# Patient Record
Sex: MALE | Race: WHITE | NOT HISPANIC OR LATINO | Employment: PART TIME | RURAL
[De-identification: names, ages, dates, MRNs, and addresses within clinical notes are randomized per-mention and may not be internally consistent; named-entity substitution may affect disease eponyms.]

---

## 2019-06-17 ENCOUNTER — OFFICE VISIT (OUTPATIENT)
Dept: PHYSICAL THERAPY | Facility: CLINIC | Age: 54
End: 2019-06-17

## 2019-06-17 ENCOUNTER — TRANSCRIBE ORDERS (OUTPATIENT)
Dept: PHYSICAL THERAPY | Facility: CLINIC | Age: 54
End: 2019-06-17

## 2019-06-17 DIAGNOSIS — M25.512 ACUTE PAIN OF LEFT SHOULDER: ICD-10-CM

## 2019-06-17 DIAGNOSIS — Z96.612 STATUS POST TOTAL SHOULDER ARTHROPLASTY, LEFT: Primary | ICD-10-CM

## 2019-06-17 DIAGNOSIS — Z96.612 STATUS POST REPLACEMENT OF LEFT SHOULDER JOINT: Primary | ICD-10-CM

## 2019-06-17 PROCEDURE — 97110 THERAPEUTIC EXERCISES: CPT | Performed by: PHYSICAL THERAPIST

## 2019-06-17 PROCEDURE — G0283 ELEC STIM OTHER THAN WOUND: HCPCS | Performed by: PHYSICAL THERAPIST

## 2019-06-17 PROCEDURE — 97140 MANUAL THERAPY 1/> REGIONS: CPT | Performed by: PHYSICAL THERAPIST

## 2019-06-17 NOTE — PROGRESS NOTES
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 provider found  Date of Initial Visit: 2019  Today's Date: 2019  Patient seen for 12 sessions           Subjective Questionnaire: QuickDASH: 59%      Subjective Evaluation    History of Present Illness    Subjective comment: Pt reports he is doing well, hopes to get out of sling soon. Sees MD this wk. Doing HEP daily, shoulder still feels tight.Pain  Current pain rating: 3             Objective       Passive Range of Motion   Left Shoulder   Flexion: 120 degrees   Abduction: 136 degrees   External rotation 45°: 30 degrees   Internal rotation 45°: 51 degrees     Strength/Myotome Testing     Additional Strength Details  Strength not assessed this date.  Left Shoulder Flexibility Comments:   L UT tightness and L Subscap tightness present.  Right Shoulder Flexibility Comments:   L UT tightness and L Subscap tightness present.    General Comments     Shoulder Comments   Quick DASH = 59% impairment         Assessment & Plan     Assessment  Impairments: abnormal or restricted ROM and impaired physical strength  Prognosis: good  Functional Limitations: carrying objects, lifting, sleeping, pulling, pushing, reaching behind back and reaching overhead  Goals  Plan Goals: STG:  Increase L shoulder PROM to 130 deg in 2 weeks. - Partially Met  Pt to demonstrate full elbow AROM in 2 weeks. - Met    LTG:  Improve Quick DASH to 20% or less impairment by discharge - Not Met  Increase L shoulder strength to 4/5 or greater by discharge - Not Met  Increase : sjpi;marii f;exopm AROM to 140 deg by discharge - Not Met  Pt to be independent with HEP by discharge - Partially Met    Plan  Therapy options: will be seen for skilled physical therapy services  Planned modality interventions: cryotherapy, TENS and thermotherapy  (hydrocollator packs)  Planned therapy interventions: functional ROM exercises, flexibility, home exercise program, joint mobilization, stretching, manual therapy, strengthening and soft tissue mobilization  Frequency: 2x week  Duration in visits: 12  Treatment plan discussed with: patient        Timed:         Manual Therapy:    10     mins  89862;     Therapeutic Exercise:    15     mins  05355;         Un-Timed:  Electrical Stimulation:    15     mins  67803 ( );        Timed Treatment:   25   mins   Total Treatment:     40   mins    PT SIGNATURE: Amparo Horton, PT   DATE TREATMENT INITIATED: 6/17/2019    90 Day Recertification  Certification Period: 9/15/2019  I certify that the therapy services are furnished while this patient is under my care.  The services outlined above are required by this patient, and will be reviewed every 90 days.     PHYSICIAN:  Dr. Nicho Castellano ________________________________________________    DATE:     Please sign and return via fax to  .. Thank you, [facility] Physical Therapy.

## 2019-08-09 ENCOUNTER — TELEPHONE (OUTPATIENT)
Dept: FAMILY MEDICINE CLINIC | Facility: CLINIC | Age: 54
End: 2019-08-09

## 2019-08-09 ENCOUNTER — OFFICE VISIT (OUTPATIENT)
Dept: FAMILY MEDICINE CLINIC | Facility: CLINIC | Age: 54
End: 2019-08-09

## 2019-08-09 VITALS
TEMPERATURE: 97.4 F | RESPIRATION RATE: 18 BRPM | SYSTOLIC BLOOD PRESSURE: 104 MMHG | BODY MASS INDEX: 17.8 KG/M2 | HEART RATE: 78 BPM | DIASTOLIC BLOOD PRESSURE: 66 MMHG | OXYGEN SATURATION: 100 % | WEIGHT: 120.2 LBS | HEIGHT: 69 IN

## 2019-08-09 DIAGNOSIS — R51.9 GENERALIZED HEADACHES: ICD-10-CM

## 2019-08-09 DIAGNOSIS — H93.13 TINNITUS OF BOTH EARS: Primary | ICD-10-CM

## 2019-08-09 PROBLEM — M19.012 DEGENERATIVE JOINT DISEASE OF SHOULDER, LEFT: Status: ACTIVE | Noted: 2019-05-08

## 2019-08-09 PROBLEM — E78.2 MIXED HYPERLIPIDEMIA: Status: ACTIVE | Noted: 2019-08-09

## 2019-08-09 PROCEDURE — 99212 OFFICE O/P EST SF 10 MIN: CPT | Performed by: FAMILY MEDICINE

## 2019-08-09 NOTE — PROGRESS NOTES
Subjective   Mnainder Edwards is a 53 y.o. male.     Was in [place] in 2001, started getting headaches and ringing in ears. Reports headaches are getting worse.       Tinnitus   The current episode started more than 1 year ago. The problem occurs constantly. Associated symptoms include headaches. Pertinent negatives include no abdominal pain, chest pain, fatigue, fever, joint swelling, nausea, neck pain, numbness, rash, swollen glands, vomiting or weakness.        The following portions of the patient's history were reviewed and updated as appropriate: allergies, current medications, past family history, past medical history, past social history, past surgical history and problem list.    Patient Active Problem List   Diagnosis   • Degenerative joint disease of shoulder, left   • Mixed hyperlipidemia       Current Outpatient Medications on File Prior to Visit   Medication Sig Dispense Refill   • ASPIRIN 81 PO Take 81 mg by mouth Daily.       No current facility-administered medications on file prior to visit.        No Known Allergies    Review of Systems   Constitutional: Negative for activity change, appetite change, fatigue and fever.   HENT: Positive for tinnitus. Negative for ear pain and voice change.    Eyes: Negative for visual disturbance.   Respiratory: Negative for shortness of breath and wheezing.    Cardiovascular: Negative for chest pain and leg swelling.   Gastrointestinal: Negative for abdominal pain, blood in stool, constipation, diarrhea, nausea and vomiting.   Endocrine: Negative for polydipsia and polyuria.   Genitourinary: Negative for dysuria, frequency and hematuria.   Musculoskeletal: Negative for joint swelling, neck pain and neck stiffness.   Skin: Negative for rash and bruise.   Neurological: Negative for weakness, numbness and headache.   Psychiatric/Behavioral: Negative for suicidal ideas and depressed mood.       Objective   Physical Exam   Constitutional: He is oriented to person,  place, and time. He appears well-developed and well-nourished.   HENT:   Right Ear: Tympanic membrane, external ear and ear canal normal.   Left Ear: Tympanic membrane, external ear and ear canal normal.   Eyes: Conjunctivae and EOM are normal. Pupils are equal, round, and reactive to light.   Neck: Normal range of motion. Neck supple.   Cardiovascular: Normal rate, regular rhythm and normal heart sounds.   Pulmonary/Chest: Effort normal and breath sounds normal.   Abdominal: Soft. Bowel sounds are normal.   Musculoskeletal: Normal range of motion.   Neurological: He is alert and oriented to person, place, and time.   Skin: Skin is warm and dry.   Psychiatric: He has a normal mood and affect. His behavior is normal. Judgment and thought content normal.         Assessment/Plan .  Problem List Items Addressed This Visit     None      Visit Diagnoses     Tinnitus of both ears    -  Primary    Generalized headaches          Findings discussed. All questions answered.  [facility] and treatment options.

## 2019-08-13 ENCOUNTER — TELEPHONE (OUTPATIENT)
Dept: FAMILY MEDICINE CLINIC | Facility: CLINIC | Age: 54
End: 2019-08-13

## 2019-08-13 NOTE — TELEPHONE ENCOUNTER
Pt notified per Dr. Yo can only give him office note from current visit.  Cannot write letter because we cannot verify what he actually had going on back in 2002.

## 2019-08-13 NOTE — TELEPHONE ENCOUNTER
PT ASKS FOR LETTER STATING PT HAS HAD RINGING IN EAR AND HEADACHES - STATES HAS HAD SINCE 2002 WHEN HE WAS IN [facility] - NEEDS DOCUMENTATION TO TAKE TO [facility] SHOWING HE HAS THIS PROBLEM -   PLEASE CALL WHEN READY - PT WOULD LIKE TO  -

## 2019-08-21 ENCOUNTER — OFFICE VISIT (OUTPATIENT)
Dept: PHYSICAL THERAPY | Facility: CLINIC | Age: 54
End: 2019-08-21

## 2019-08-21 DIAGNOSIS — M25.512 ACUTE PAIN OF LEFT SHOULDER: ICD-10-CM

## 2019-08-21 DIAGNOSIS — Z96.612 STATUS POST REPLACEMENT OF LEFT SHOULDER JOINT: Primary | ICD-10-CM

## 2019-08-21 PROCEDURE — 97110 THERAPEUTIC EXERCISES: CPT | Performed by: PHYSICAL THERAPIST

## 2019-08-21 PROCEDURE — 97140 MANUAL THERAPY 1/> REGIONS: CPT | Performed by: PHYSICAL THERAPIST

## 2019-08-21 NOTE — PROGRESS NOTES
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 Reports he is doing 5# overhead presses, 20# bicep curls, table push-ups and push-up on floor on knees. Reports having no pain just tightness.    Objective : Early scap movement noted during flexion and abd AROM.   Posture: L scap elevated and abducted.  Tightness noted L UT, L lat and teres. Good response to manual stretching.  Strength L shoulder: flex = 4/5, abd = 4-/5, ER = 3+/5, IR = 4+/5  L shoulder AROM: flex = 114; abd = 118; ER = 48; IR = 50    See Exercise, Manual, and Modality Logs for complete treatment.  Instructed pt on lat and teres stretching with use of belt to block or hand to block scap. Pt demonstrated good understanding.      Assessment/Plan : Needs scap strengthening along with lat and teres stretching to improve scapular stability and mobility. Overall strength gains made since last visit. Pt very motivated.    Progress per Plan of Care and Progress strengthening /stabilization /functional activity           Timed:         Manual Therapy:    25     mins  84362;     Therapeutic Exercise:    30     mins  91181;     Neuromuscular Astrid:        mins  97296;    Therapeutic Activity:          mins  14837;     Gait Training:           mins  61160;     Ultrasound:          mins  65122;    Ionto                                   mins   28404  Self Care                            mins   86121  Canalith Repos                   mins  4209    Un-Timed:  Electrical Stimulation:         mins  95959 ( );  Dry  Needling          mins self-pay  Traction          mins 11785  Low Eval          Mins  13449  Mod Eval          Mins  92707  High Eval                            Mins  02894    Timed Treatment:   55   mins   Total Treatment:     55   mins    Amparo Horton, PT  Physical Therapist

## 2019-08-22 ENCOUNTER — OFFICE VISIT (OUTPATIENT)
Dept: PHYSICAL THERAPY | Facility: CLINIC | Age: 54
End: 2019-08-22

## 2019-08-22 DIAGNOSIS — M25.512 ACUTE PAIN OF LEFT SHOULDER: ICD-10-CM

## 2019-08-22 DIAGNOSIS — Z96.612 STATUS POST REPLACEMENT OF LEFT SHOULDER JOINT: Primary | ICD-10-CM

## 2019-08-22 PROCEDURE — 97140 MANUAL THERAPY 1/> REGIONS: CPT | Performed by: PHYSICAL THERAPIST

## 2019-08-22 PROCEDURE — 97110 THERAPEUTIC EXERCISES: CPT | Performed by: PHYSICAL THERAPIST

## 2019-08-22 NOTE — PROGRESS NOTES
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 No soreness after PT visit yesterday. Pt thankful for new exercises provided yesterday also.    Objective :   See Exercise, Manual, and Modality Logs for complete treatment.       Assessment/Plan : Good response to lat and teres stretching at start of session. Good tolerance to all ther ex with good awareness of posture and biomechanics of shoulder during ther ex.    Progress per Plan of Care and Progress strengthening /stabilization /functional activity           Timed:         Manual Therapy:    12     mins  58957;     Therapeutic Exercise:    22     mins  46023;     Neuromuscular Astrid:        mins  85969;    Therapeutic Activity:          mins  10058;     Gait Training:           mins  62246;     Ultrasound:          mins  34371;    Ionto                                   mins   70913  Self Care                            mins   08787  Canalith Repos                   mins  4209    Un-Timed:  Electrical Stimulation:         mins  41417 ( );  Dry Needling          mins self-pay  Traction          mins 20132  Low Eval          Mins  24856  Mod Eval          Mins  54289  High Eval                            Mins  21967    Timed Treatment:   34   mins   Total Treatment:     40   mins    Amparo Horton, PT  Physical Therapist

## 2019-08-27 ENCOUNTER — OFFICE VISIT (OUTPATIENT)
Dept: PHYSICAL THERAPY | Facility: CLINIC | Age: 54
End: 2019-08-27

## 2019-08-27 DIAGNOSIS — Z96.612 STATUS POST REPLACEMENT OF LEFT SHOULDER JOINT: Primary | ICD-10-CM

## 2019-08-27 DIAGNOSIS — M25.512 ACUTE PAIN OF LEFT SHOULDER: ICD-10-CM

## 2019-08-27 PROCEDURE — 97110 THERAPEUTIC EXERCISES: CPT | Performed by: PHYSICAL THERAPIST

## 2019-08-27 PROCEDURE — 97140 MANUAL THERAPY 1/> REGIONS: CPT | Performed by: PHYSICAL THERAPIST

## 2019-08-27 NOTE — PROGRESS NOTES
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 Most pain is in morning with stiffness and improves after he stretches. States he did 20 push-ups yesterday and could feel it at anterior shoulder.    Objective : pec tightness noted.  See Exercise, Manual, and Modality Logs for complete treatment.       Assessment/Plan : Good tolerance to ther ex progression with no reports of pain. Good response to doorway pec stretch and doorway ext rot stretch. Needs continuing stretching and scap strengthening for improved shoulder biomechanics.    Progress per Plan of Care and Progress strengthening /stabilization /functional activity           Timed:         Manual Therapy:    10     mins  08587;     Therapeutic Exercise:    29     mins  87449;     Neuromuscular Astrid:        mins  56022;    Therapeutic Activity:          mins  16458;     Gait Training:           mins  81661;     Ultrasound:          mins  49546;    Ionto                                   mins   08353  Self Care                            mins   62719  Canalith Repos                   mins  4209    Un-Timed:  Electrical Stimulation:         mins  94213 ( );  Dry Needling          mins self-pay  Traction          mins 01294  Low Eval          Mins  69985  Mod Eval          Mins  37437  High Eval                            Mins  14624    Timed Treatment:   39   mins   Total Treatment:     39   mins    Amparo Horton, PT  Physical Therapist

## 2019-08-28 ENCOUNTER — OFFICE VISIT (OUTPATIENT)
Dept: PHYSICAL THERAPY | Facility: CLINIC | Age: 54
End: 2019-08-28

## 2019-08-28 DIAGNOSIS — Z96.612 STATUS POST REPLACEMENT OF LEFT SHOULDER JOINT: Primary | ICD-10-CM

## 2019-08-28 DIAGNOSIS — M25.512 ACUTE PAIN OF LEFT SHOULDER: ICD-10-CM

## 2019-08-28 PROCEDURE — 97140 MANUAL THERAPY 1/> REGIONS: CPT | Performed by: PHYSICAL THERAPIST

## 2019-08-28 PROCEDURE — 97110 THERAPEUTIC EXERCISES: CPT | Performed by: PHYSICAL THERAPIST

## 2019-08-28 NOTE — PROGRESS NOTES
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 No pain today. Stretches added to HEP yesterday feel good, can tell his chest muscles are tight.    Objective : Pt denied any MHP or ice to shoulder post-tx.  See Exercise, Manual, and Modality Logs for complete treatment.       Assessment/Plan : Good tolerance to ther ex progression. Responding well to scap strengthening and stretching. Needs continued scap base strengthening for improved shoulder biomechanics.     Progress per Plan of Care and Progress strengthening /stabilization /functional activity           Timed:         Manual Therapy:    10     mins  93619;     Therapeutic Exercise:    30     mins  28928;     Neuromuscular Astrid:        mins  57424;    Therapeutic Activity:          mins  53970;     Gait Training:           mins  38105;     Ultrasound:          mins  50863;    Ionto                                   mins   42427  Self Care                            mins   42179  Canalith Repos                   mins  4209    Un-Timed:  Electrical Stimulation:         mins  77423 ( );  Dry Needling          mins self-pay  Traction          mins 53325  Low Eval          Mins  40130  Mod Eval          Mins  21397  High Eval                            Mins  61228    Timed Treatment:   40   mins   Total Treatment:     40   mins    Amparo Horton, PT  Physical Therapist

## 2019-09-04 ENCOUNTER — OFFICE VISIT (OUTPATIENT)
Dept: PHYSICAL THERAPY | Facility: CLINIC | Age: 54
End: 2019-09-04

## 2019-09-04 DIAGNOSIS — M25.512 ACUTE PAIN OF LEFT SHOULDER: ICD-10-CM

## 2019-09-04 DIAGNOSIS — Z96.612 STATUS POST REPLACEMENT OF LEFT SHOULDER JOINT: Primary | ICD-10-CM

## 2019-09-04 PROCEDURE — 97140 MANUAL THERAPY 1/> REGIONS: CPT | Performed by: PHYSICAL THERAPIST

## 2019-09-04 PROCEDURE — 97110 THERAPEUTIC EXERCISES: CPT | Performed by: PHYSICAL THERAPIST

## 2019-09-04 NOTE — PROGRESS NOTES
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 Has not worked out x3-4 days due to being busy and mom having surgery.    Objective : Pt declined MHP or ice post-tx.  See Exercise, Manual, and Modality Logs for complete treatment.       Assessment/Plan : Continued lateral scap tightness but improving. Good tolerance to all ther ex.     Progress per Plan of Care and Progress strengthening /stabilization /functional activity           Timed:         Manual Therapy:    10     mins  93207;     Therapeutic Exercise:    30     mins  57171;     Neuromuscular Astrid:        mins  59644;    Therapeutic Activity:          mins  64970;     Gait Training:           mins  17142;     Ultrasound:          mins  15294;    Ionto                                   mins   51089  Self Care                            mins   05733  Canalith Repos                   mins  4209    Un-Timed:  Electrical Stimulation:         mins  01142 ( );  Dry Needling          mins self-pay  Traction          mins 38402  Low Eval          Mins  13133  Mod Eval          Mins  10660  High Eval                            Mins  84758    Timed Treatment:   40   mins   Total Treatment:     40   mins    Amparo Horton, PT  Physical Therapist

## 2019-09-06 ENCOUNTER — OFFICE VISIT (OUTPATIENT)
Dept: PHYSICAL THERAPY | Facility: CLINIC | Age: 54
End: 2019-09-06

## 2019-09-06 DIAGNOSIS — Z96.612 STATUS POST REPLACEMENT OF LEFT SHOULDER JOINT: Primary | ICD-10-CM

## 2019-09-06 DIAGNOSIS — M25.512 ACUTE PAIN OF LEFT SHOULDER: ICD-10-CM

## 2019-09-06 PROCEDURE — 97110 THERAPEUTIC EXERCISES: CPT | Performed by: PHYSICAL THERAPIST

## 2019-09-06 PROCEDURE — 97140 MANUAL THERAPY 1/> REGIONS: CPT | Performed by: PHYSICAL THERAPIST

## 2019-09-06 NOTE — PROGRESS NOTES
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 Happy with progress. No pain today.    Objective : Decreased tightness noted at lat and teres.  See Exercise, Manual, and Modality Logs for complete treatment.       Assessment/Plan : AROM improving. Scap strength progressing. Progressing well toward goals.    Progress per Plan of Care and Progress strengthening /stabilization /functional activity.           Timed:         Manual Therapy:    12     mins  32293;     Therapeutic Exercise:    20     mins  56113;     Neuromuscular Astrid:        mins  86118;    Therapeutic Activity:          mins  30399;     Gait Training:           mins  38370;     Ultrasound:          mins  39606;    Ionto                                   mins   83906  Self Care                            mins   13788  Canalith Repos                   mins  4209    Un-Timed:  Electrical Stimulation:         mins  28843 ( );  Dry Needling          mins self-pay  Traction          mins 59935  Low Eval          Mins  13005  Mod Eval          Mins  09180  High Eval                            Mins  11918    Timed Treatment:   32   mins   Total Treatment:     32   mins    Amparo Horton, PT  Physical Therapist

## 2019-09-10 ENCOUNTER — OFFICE VISIT (OUTPATIENT)
Dept: PHYSICAL THERAPY | Facility: CLINIC | Age: 54
End: 2019-09-10

## 2019-09-10 DIAGNOSIS — M25.512 ACUTE PAIN OF LEFT SHOULDER: ICD-10-CM

## 2019-09-10 DIAGNOSIS — Z96.612 STATUS POST REPLACEMENT OF LEFT SHOULDER JOINT: Primary | ICD-10-CM

## 2019-09-10 PROCEDURE — 97110 THERAPEUTIC EXERCISES: CPT | Performed by: PHYSICAL THERAPIST

## 2019-09-10 PROCEDURE — 97140 MANUAL THERAPY 1/> REGIONS: CPT | Performed by: PHYSICAL THERAPIST

## 2019-09-10 NOTE — PROGRESS NOTES
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 Pt states he still feels weak with rotation movements.    Objective :   See Exercise, Manual, and Modality Logs for complete treatment.       Assessment/Plan : Good tolerance to all ther ex with fatigue noted with rotation movements. Progressing well with scap strengthening.    Progress per Plan of Care and Progress strengthening /stabilization /functional activity           Timed:         Manual Therapy:    9     mins  92324;     Therapeutic Exercise:    21     mins  94924;     Neuromuscular Astrid:        mins  93793;    Therapeutic Activity:          mins  37193;     Gait Training:           mins  38598;     Ultrasound:          mins  66565;    Ionto                                   mins   40462  Self Care                            mins   37539  Canalith Repos                   mins  4209    Un-Timed:  Electrical Stimulation:         mins  48039 ( );  Dry Needling          mins self-pay  Traction          mins 41766  Low Eval          Mins  73394  Mod Eval          Mins  69981  High Eval                            Mins  50666    Timed Treatment:   30   mins   Total Treatment:     30   mins    Amparo Horton, PT  Physical Therapist

## 2019-09-12 ENCOUNTER — OFFICE VISIT (OUTPATIENT)
Dept: PHYSICAL THERAPY | Facility: CLINIC | Age: 54
End: 2019-09-12

## 2019-09-12 DIAGNOSIS — M25.512 ACUTE PAIN OF LEFT SHOULDER: ICD-10-CM

## 2019-09-12 DIAGNOSIS — Z96.612 STATUS POST REPLACEMENT OF LEFT SHOULDER JOINT: Primary | ICD-10-CM

## 2019-09-12 PROCEDURE — 97140 MANUAL THERAPY 1/> REGIONS: CPT | Performed by: PHYSICAL THERAPIST

## 2019-09-12 PROCEDURE — 97110 THERAPEUTIC EXERCISES: CPT | Performed by: PHYSICAL THERAPIST

## 2019-09-12 NOTE — PROGRESS NOTES
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 Pt states his shoulder felt stiff yesterday morning but after doing exercises, shoulder felt good.    Objective :   See Exercise, Manual, and Modality Logs for complete treatment.       Assessment/Plan : Good tolerance to ther ex with improved shoulder biomechanics noted. Progressing well toward goals.    Progress per Plan of Care and Progress strengthening /stabilization /functional activity           Timed:         Manual Therapy:    8     mins  10588;     Therapeutic Exercise:    20     mins  67714;     Neuromuscular Astrid:        mins  74994;    Therapeutic Activity:          mins  65617;     Gait Training:           mins  97698;     Ultrasound:          mins  24009;    Ionto                                   mins   70272  Self Care                            mins   62520  Canalith Repos                   mins  4209    Un-Timed:  Electrical Stimulation:         mins  62436 ( );  Dry Needling          mins self-pay  Traction          mins 82206  Low Eval          Mins  19960  Mod Eval          Mins  62935  High Eval                            Mins  53387    Timed Treatment:   30   mins   Total Treatment:     35   mins    Amparo Horton, PT  Physical Therapist

## 2019-09-19 ENCOUNTER — TREATMENT (OUTPATIENT)
Dept: PHYSICAL THERAPY | Facility: CLINIC | Age: 54
End: 2019-09-19

## 2019-09-19 DIAGNOSIS — M25.512 ACUTE PAIN OF LEFT SHOULDER: ICD-10-CM

## 2019-09-19 DIAGNOSIS — Z96.612 STATUS POST REPLACEMENT OF LEFT SHOULDER JOINT: Primary | ICD-10-CM

## 2019-09-19 PROCEDURE — 97140 MANUAL THERAPY 1/> REGIONS: CPT | Performed by: PHYSICAL THERAPIST

## 2019-09-19 PROCEDURE — 97110 THERAPEUTIC EXERCISES: CPT | Performed by: PHYSICAL THERAPIST

## 2019-09-19 NOTE — PROGRESS NOTES
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 States he feels like L shoulder is lacking 30% or R function.    Objective :  See Exercise, Manual, and Modality Logs for complete treatment.       Assessment/Plan : Good tolerance to all ther ex with no reports of pain. Progressing well in strength.    Progress per Plan of Care and Progress strengthening /stabilization /functional activity.           Timed:         Manual Therapy:    8     mins  90771;     Therapeutic Exercise:    22     mins  07812;     Neuromuscular Astrid:        mins  06853;    Therapeutic Activity:          mins  43363;     Gait Training:           mins  77554;     Ultrasound:          mins  56031;    Ionto                                   mins   93666  Self Care                            mins   50320  Canalith Repos                   mins  4209    Un-Timed:  Electrical Stimulation:         mins  15565 ( );  Dry Needling          mins self-pay  Traction          mins 85337  Low Eval          Mins  17819  Mod Eval          Mins  50672  High Eval                            Mins  89371    Timed Treatment:   30   mins   Total Treatment:     36   mins    Amparo Horton, PT  Physical Therapist

## 2019-09-23 ENCOUNTER — TREATMENT (OUTPATIENT)
Dept: PHYSICAL THERAPY | Facility: CLINIC | Age: 54
End: 2019-09-23

## 2019-09-23 DIAGNOSIS — M25.512 ACUTE PAIN OF LEFT SHOULDER: ICD-10-CM

## 2019-09-23 DIAGNOSIS — Z96.612 STATUS POST REPLACEMENT OF LEFT SHOULDER JOINT: Primary | ICD-10-CM

## 2019-09-23 PROCEDURE — 97110 THERAPEUTIC EXERCISES: CPT | Performed by: PHYSICAL THERAPIST

## 2019-09-23 PROCEDURE — 97140 MANUAL THERAPY 1/> REGIONS: CPT | Performed by: PHYSICAL THERAPIST

## 2019-09-23 NOTE — PROGRESS NOTES
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 Sees MD in 2 days. States shoulder feels stiff today.    Objective : L shoulder strength 4-/5 in all directions.  L shoulder AROM in standing: Flex = 125 deg, abd = 145 deg  See Exercise, Manual, and Modality Logs for complete treatment.       Assessment/Plan : Good tolerance to all ther ex. Progressing well toward goals. Needs continued shoulder strengthening.    Progress per Plan of Care and Progress strengthening /stabilization /functional activity           Timed:         Manual Therapy:    10     mins  21107;     Therapeutic Exercise:    22     mins  17852;     Neuromuscular Astrid:        mins  11319;    Therapeutic Activity:          mins  25394;     Gait Training:           mins  48954;     Ultrasound:          mins  77351;    Ionto                                   mins   28599  Self Care                            mins   83213  Canalith Repos                   mins  4209    Un-Timed:  Electrical Stimulation:         mins  70770 ( );  Dry Needling          mins self-pay  Traction          mins 33535  Low Eval          Mins  72011  Mod Eval          Mins  15828  High Eval                            Mins  54528    Timed Treatment:   32   mins   Total Treatment:     32   mins    Amparo Horton, PT  Physical Therapist

## 2019-09-26 ENCOUNTER — TREATMENT (OUTPATIENT)
Dept: PHYSICAL THERAPY | Facility: CLINIC | Age: 54
End: 2019-09-26

## 2019-09-26 DIAGNOSIS — M25.512 ACUTE PAIN OF LEFT SHOULDER: ICD-10-CM

## 2019-09-26 DIAGNOSIS — Z96.612 STATUS POST REPLACEMENT OF LEFT SHOULDER JOINT: Primary | ICD-10-CM

## 2019-09-26 PROCEDURE — 97140 MANUAL THERAPY 1/> REGIONS: CPT | Performed by: PHYSICAL THERAPIST

## 2019-09-26 PROCEDURE — 97110 THERAPEUTIC EXERCISES: CPT | Performed by: PHYSICAL THERAPIST

## 2019-09-26 NOTE — PROGRESS NOTES
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 Goes back to MD 3/2020. States he had some discomfort in his shoulder when moving and throwing some branches at his parents' house over the weekend.    Objective :   See Exercise, Manual, and Modality Logs for complete treatment.     Assessment/Plan : Pt tolerated ther ex progression well. Needs continued scap strengthening for improved shoulder biomechanics.    Progress per Plan of Care and Progress strengthening /stabilization /functional activity.           Timed:         Manual Therapy:    10     mins  52735;     Therapeutic Exercise:    30     mins  32329;     Neuromuscular Astrid:        mins  51712;    Therapeutic Activity:          mins  66381;     Gait Training:           mins  87255;     Ultrasound:          mins  20754;    Ionto                                   mins   08715  Self Care                            mins   99634  Canalith Repos                   mins  4209    Un-Timed:  Electrical Stimulation:         mins  27626 ( );  Dry Needling          mins self-pay  Traction          mins 02129  Low Eval          Mins  59730  Mod Eval          Mins  14200  High Eval                            Mins  19488    Timed Treatment:   40   mins   Total Treatment:     40   mins    Amparo Horton, PT  Physical Therapist

## 2019-10-02 ENCOUNTER — TREATMENT (OUTPATIENT)
Dept: PHYSICAL THERAPY | Facility: CLINIC | Age: 54
End: 2019-10-02

## 2019-10-02 DIAGNOSIS — Z96.612 STATUS POST REPLACEMENT OF LEFT SHOULDER JOINT: Primary | ICD-10-CM

## 2019-10-02 DIAGNOSIS — M25.512 ACUTE PAIN OF LEFT SHOULDER: ICD-10-CM

## 2019-10-02 PROCEDURE — 97110 THERAPEUTIC EXERCISES: CPT | Performed by: PHYSICAL THERAPIST

## 2019-10-02 PROCEDURE — 97140 MANUAL THERAPY 1/> REGIONS: CPT | Performed by: PHYSICAL THERAPIST

## 2019-10-02 NOTE — PROGRESS NOTES
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     Objective :   See Exercise, Manual, and Modality Logs for complete treatment.       Assessment/Plan : Good tolerance to ther ex with no reports of pain. Needs continued scap and shoulder strengthening.    Progress per Plan of Care and Progress strengthening /stabilization /functional activity.           Timed:         Manual Therapy:    10     mins  05763;     Therapeutic Exercise:    20     mins  28929;     Neuromuscular Astrid:        mins  95823;    Therapeutic Activity:          mins  79615;     Gait Training:           mins  70420;     Ultrasound:          mins  74400;    Ionto                                   mins   61964  Self Care                            mins   14074  Canalith Repos                   mins  4209    Un-Timed:  Electrical Stimulation:         mins  11350 ( );  Dry Needling          mins self-pay  Traction          mins 12334  Low Eval          Mins  61692  Mod Eval          Mins  92099  High Eval                            Mins  48334    Timed Treatment:   30   mins   Total Treatment:     30   mins    Amparo Horton, PT  Physical Therapist

## 2019-10-08 ENCOUNTER — TREATMENT (OUTPATIENT)
Dept: PHYSICAL THERAPY | Facility: CLINIC | Age: 54
End: 2019-10-08

## 2019-10-08 DIAGNOSIS — M25.512 ACUTE PAIN OF LEFT SHOULDER: ICD-10-CM

## 2019-10-08 DIAGNOSIS — Z96.612 STATUS POST REPLACEMENT OF LEFT SHOULDER JOINT: Primary | ICD-10-CM

## 2019-10-08 PROCEDURE — 97140 MANUAL THERAPY 1/> REGIONS: CPT | Performed by: PHYSICAL THERAPIST

## 2019-10-08 PROCEDURE — 97110 THERAPEUTIC EXERCISES: CPT | Performed by: PHYSICAL THERAPIST

## 2019-10-08 NOTE — PROGRESS NOTES
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 States he worked out yesterday, had some fatigue noted with push-ups but states he did regular push-ups.    Objective : Added shoulder PNF patterns to HEP with Tband. Provided red and green Tbands and copy of exercises.  See Exercise, Manual, and Modality Logs for complete treatment.       Assessment/Plan : Good tolerance to all ther ex. Progressing well toward goals but pt reports weakness.    Progress per Plan of Care and Progress strengthening /stabilization /functional activity.           Timed:         Manual Therapy:    8     mins  08256;     Therapeutic Exercise:    33     mins  21571;     Neuromuscular Astrid:        mins  61489;    Therapeutic Activity:          mins  87180;     Gait Training:           mins  65967;     Ultrasound:          mins  26687;    Ionto                                   mins   36781  Self Care                            mins   15884  Canalith Repos                   mins  4209    Un-Timed:  Electrical Stimulation:         mins  74769 ( );  Dry Needling          mins self-pay  Traction          mins 09552  Low Eval          Mins  61106  Mod Eval          Mins  28493  High Eval                            Mins  56735    Timed Treatment:   41   mins   Total Treatment:     41   mins    Amparo Horton, PT  Physical Therapist

## 2019-10-10 ENCOUNTER — TREATMENT (OUTPATIENT)
Dept: PHYSICAL THERAPY | Facility: CLINIC | Age: 54
End: 2019-10-10

## 2019-10-10 DIAGNOSIS — Z96.612 STATUS POST REPLACEMENT OF LEFT SHOULDER JOINT: Primary | ICD-10-CM

## 2019-10-10 DIAGNOSIS — M25.512 ACUTE PAIN OF LEFT SHOULDER: ICD-10-CM

## 2019-10-10 PROCEDURE — 97110 THERAPEUTIC EXERCISES: CPT | Performed by: PHYSICAL THERAPIST

## 2019-10-10 PROCEDURE — 97140 MANUAL THERAPY 1/> REGIONS: CPT | Performed by: PHYSICAL THERAPIST

## 2019-10-10 NOTE — PROGRESS NOTES
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 Can tell he is still making strength gains.    Objective :  See Exercise, Manual, and Modality Logs for complete treatment.       Assessment/Plan : Good tolerance to ther ex with improved scap awareness and stabilization. Progressing well toward goals.    Progress per Plan of Care and Progress strengthening /stabilization /functional activity.           Timed:         Manual Therapy:    10     mins  17588;     Therapeutic Exercise:    29     mins  52884;     Neuromuscular Astrid:        mins  34024;    Therapeutic Activity:          mins  47890;     Gait Training:           mins  22030;     Ultrasound:          mins  54674;    Ionto                                   mins   23209  Self Care                            mins   16359  Canalith Repos                   mins  4209    Un-Timed:  Electrical Stimulation:         mins  03020 ( );  Dry Needling          mins self-pay  Traction          mins 40046  Low Eval          Mins  31356  Mod Eval          Mins  41710  High Eval                            Mins  88645    Timed Treatment:   39   mins   Total Treatment:     39   mins    Amparo Horton, PT  Physical Therapist

## 2019-10-15 ENCOUNTER — TREATMENT (OUTPATIENT)
Dept: PHYSICAL THERAPY | Facility: CLINIC | Age: 54
End: 2019-10-15

## 2019-10-15 DIAGNOSIS — Z96.612 STATUS POST REPLACEMENT OF LEFT SHOULDER JOINT: Primary | ICD-10-CM

## 2019-10-15 DIAGNOSIS — M25.512 ACUTE PAIN OF LEFT SHOULDER: ICD-10-CM

## 2019-10-15 PROCEDURE — 97140 MANUAL THERAPY 1/> REGIONS: CPT | Performed by: PHYSICAL THERAPIST

## 2019-10-15 PROCEDURE — 97110 THERAPEUTIC EXERCISES: CPT | Performed by: PHYSICAL THERAPIST

## 2019-10-15 NOTE — PROGRESS NOTES
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 Sleeping good. Feels like strength is improving.    Objective :  See Exercise, Manual, and Modality Logs for complete treatment.       Assessment/Plan : Good tolerance to all ther ex with no pain. Strength progressing well.    Progress per Plan of Care and Progress strengthening /stabilization /functional activity.           Timed:         Manual Therapy:    10     mins  19008;     Therapeutic Exercise:    22     mins  11134;     Neuromuscular Astrid:        mins  23007;    Therapeutic Activity:          mins  62818;     Gait Training:           mins  70527;     Ultrasound:          mins  90579;    Ionto                                   mins   43686  Self Care                            mins   54200  Canalith Repos                   mins  4209    Un-Timed:  Electrical Stimulation:         mins  81277 ( );  Dry Needling          mins self-pay  Traction          mins 00525  Low Eval          Mins  78855  Mod Eval          Mins  60301  High Eval                            Mins  17513    Timed Treatment:   32   mins   Total Treatment:     32   mins    Amparo Horton, PT  Physical Therapist

## 2019-10-17 ENCOUNTER — TREATMENT (OUTPATIENT)
Dept: PHYSICAL THERAPY | Facility: CLINIC | Age: 54
End: 2019-10-17

## 2019-10-17 DIAGNOSIS — Z96.612 STATUS POST REPLACEMENT OF LEFT SHOULDER JOINT: Primary | ICD-10-CM

## 2019-10-17 DIAGNOSIS — M25.512 ACUTE PAIN OF LEFT SHOULDER: ICD-10-CM

## 2019-10-17 PROCEDURE — 97140 MANUAL THERAPY 1/> REGIONS: CPT | Performed by: PHYSICAL THERAPIST

## 2019-10-17 PROCEDURE — 97110 THERAPEUTIC EXERCISES: CPT | Performed by: PHYSICAL THERAPIST

## 2019-10-17 NOTE — PROGRESS NOTES
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 No shoulder pain, just stiffness.    Objective :  See Exercise, Manual, and Modality Logs for complete treatment.       Assessment/Plan : Good tolerance to all ther ex with no reports of pain. Strength continues to progress with good shoulder biomechanics.    Progress per Plan of Care and Progress strengthening /stabilization /functional activity.           Timed:         Manual Therapy:    8     mins  92086;     Therapeutic Exercise:    22     mins  36180;     Neuromuscular Astrid:        mins  44604;    Therapeutic Activity:          mins  75479;     Gait Training:           mins  76821;     Ultrasound:          mins  67706;    Ionto                                   mins   23378  Self Care                            mins   12622  Canalith Repos                   mins  4209    Un-Timed:  Electrical Stimulation:         mins  14848 ( );  Dry Needling          mins self-pay  Traction          mins 98776  Low Eval          Mins  51563  Mod Eval          Mins  02591  High Eval                            Mins  02582    Timed Treatment:   30   mins   Total Treatment:     30   mins    Amparo Horton, PT  Physical Therapist

## 2019-10-22 ENCOUNTER — TREATMENT (OUTPATIENT)
Dept: PHYSICAL THERAPY | Facility: CLINIC | Age: 54
End: 2019-10-22

## 2019-10-22 DIAGNOSIS — Z96.612 STATUS POST REPLACEMENT OF LEFT SHOULDER JOINT: Primary | ICD-10-CM

## 2019-10-22 DIAGNOSIS — M25.512 ACUTE PAIN OF LEFT SHOULDER: ICD-10-CM

## 2019-10-22 PROCEDURE — 97110 THERAPEUTIC EXERCISES: CPT | Performed by: PHYSICAL THERAPIST

## 2019-10-22 NOTE — PROGRESS NOTES
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 Still having some mild stiffness in the morning. Can tell L shoulder is getting stronger.    Objective : Demonstrating equal abd AROM B, L flex ~90% of R AROM.  See Exercise, Manual, and Modality Logs for complete treatment.       Assessment/Plan : Good tolerance to all ther ex with no increase in pain. Pt progressing well in strength and ROM.    Progress per Plan of Care and Progress strengthening /stabilization /functional activity.           Timed:         Manual Therapy:    5     mins  25128;     Therapeutic Exercise:    25     mins  75891;     Neuromuscular Astrid:        mins  30052;    Therapeutic Activity:          mins  71492;     Gait Training:           mins  80050;     Ultrasound:          mins  23523;    Ionto                                   mins   32337  Self Care                            mins   35962  Canalith Repos                   mins  4209    Un-Timed:  Electrical Stimulation:         mins  59411 ( );  Dry Needling          mins self-pay  Traction          mins 38472  Low Eval          Mins  22098  Mod Eval          Mins  31025  High Eval                            Mins  83646    Timed Treatment:   30   mins   Total Treatment:     30   mins    Amparo Horton, PT  Physical Therapist

## 2019-10-25 ENCOUNTER — TREATMENT (OUTPATIENT)
Dept: PHYSICAL THERAPY | Facility: CLINIC | Age: 54
End: 2019-10-25

## 2019-10-25 DIAGNOSIS — M25.512 ACUTE PAIN OF LEFT SHOULDER: ICD-10-CM

## 2019-10-25 DIAGNOSIS — Z96.612 STATUS POST REPLACEMENT OF LEFT SHOULDER JOINT: Primary | ICD-10-CM

## 2019-10-25 PROCEDURE — 97110 THERAPEUTIC EXERCISES: CPT | Performed by: PHYSICAL THERAPIST

## 2019-10-25 NOTE — PROGRESS NOTES
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 Getting stronger each day.    Objective :  See Exercise, Manual, and Modality Logs for complete treatment.       Assessment/Plan : Good tolerance to all ther ex. Progressing well toward goals.    Progress per Plan of Care and Progress strengthening /stabilization /functional activity. Plan to decrease freq to 1x/wk after next wk. Pt verbalized understanding.           Timed:         Manual Therapy:    5     mins  11293;     Therapeutic Exercise:    25     mins  10096;     Neuromuscular Astrid:        mins  40590;    Therapeutic Activity:          mins  77247;     Gait Training:           mins  21582;     Ultrasound:          mins  70433;    Ionto                                   mins   68686  Self Care                            mins   78673  Canalith Repos                   mins  4209    Un-Timed:  Electrical Stimulation:         mins  91307 ( );  Dry Needling          mins self-pay  Traction          mins 66153  Low Eval          Mins  02468  Mod Eval          Mins  85196  High Eval                            Mins  00216    Timed Treatment:   30   mins   Total Treatment:     30   mins    Amparo Horton, PT  Physical Therapist

## 2019-11-01 ENCOUNTER — TREATMENT (OUTPATIENT)
Dept: PHYSICAL THERAPY | Facility: CLINIC | Age: 54
End: 2019-11-01

## 2019-11-01 DIAGNOSIS — M25.512 ACUTE PAIN OF LEFT SHOULDER: ICD-10-CM

## 2019-11-01 DIAGNOSIS — Z96.612 STATUS POST REPLACEMENT OF LEFT SHOULDER JOINT: Primary | ICD-10-CM

## 2019-11-01 PROCEDURE — 97110 THERAPEUTIC EXERCISES: CPT | Performed by: PHYSICAL THERAPIST

## 2019-11-01 NOTE — PROGRESS NOTES
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    Objective :  See Exercise, Manual, and Modality Logs for complete treatment.       Assessment/Plan : Good tolerance to all ther ex with no increase in pain. Progressing well toward goals    STG:  Increase L shoulder PROM to 130 deg in 2 weeks. - MET  Pt to demonstrate full elbow AROM in 2 weeks. - MET  LTG:  Improve Quick DASH to 20% or less impairment by discharge - Not Met  Increase L shoulder strength to 4/5 or greater by discharge - Not Met  Increase flex AROM to 140 deg by discharge - Partially met  Pt to be independent with HEP by discharge - MET      Progress per Plan of Care and Progress strengthening /stabilization /functional activity.           Timed:         Manual Therapy:    5     mins  43124;     Therapeutic Exercise:    25     mins  53028;     Neuromuscular Astrid:        mins  23931;    Therapeutic Activity:          mins  70460;     Gait Training:           mins  61141;     Ultrasound:          mins  24534;    Ionto                                   mins   14255  Self Care                            mins   60444  Canalith Repos                   mins  4209    Un-Timed:  Electrical Stimulation:         mins  97717 ( );  Dry Needling          mins self-pay  Traction          mins 25785  Low Eval          Mins  58983  Mod Eval          Mins  88395  High Eval                            Mins  46635    Timed Treatment:   30   mins   Total Treatment:     30   mins    Amparo Horton, PT  Physical Therapist

## 2019-11-19 ENCOUNTER — TREATMENT (OUTPATIENT)
Dept: PHYSICAL THERAPY | Facility: CLINIC | Age: 54
End: 2019-11-19

## 2019-11-19 DIAGNOSIS — M25.512 ACUTE PAIN OF LEFT SHOULDER: ICD-10-CM

## 2019-11-19 DIAGNOSIS — Z96.612 STATUS POST REPLACEMENT OF LEFT SHOULDER JOINT: Primary | ICD-10-CM

## 2019-11-19 PROCEDURE — 97110 THERAPEUTIC EXERCISES: CPT | Performed by: PHYSICAL THERAPIST

## 2019-11-19 PROCEDURE — 97140 MANUAL THERAPY 1/> REGIONS: CPT | Performed by: PHYSICAL THERAPIST

## 2019-11-19 NOTE — PROGRESS NOTES
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    Objective :  See Exercise, Manual, and Modality Logs for complete treatment.       Assessment/Plan : Good tolerance to all ther ex with no reports of pain. Pt nearing plateau in progress. Anticipate PT D/C in 1-2 wk.    Progress per Plan of Care and Progress strengthening /stabilization /functional activity.           Timed:         Manual Therapy:    10     mins  38778;     Therapeutic Exercise:    30     mins  79191;     Neuromuscular Astrid:        mins  38373;    Therapeutic Activity:          mins  78394;     Gait Training:           mins  62608;     Ultrasound:          mins  80958;    Ionto                                   mins   79884  Self Care                            mins   13813  Canalith Repos                   mins  4209    Un-Timed:  Electrical Stimulation:         mins  43938 ( );  Dry Needling          mins self-pay  Traction          mins 17954  Low Eval          Mins  74329  Mod Eval          Mins  43041  High Eval                            Mins  76739    Timed Treatment:   40   mins   Total Treatment:     40   mins    Amparo Horton, PT  Physical Therapist

## 2019-11-21 ENCOUNTER — OFFICE VISIT (OUTPATIENT)
Dept: FAMILY MEDICINE CLINIC | Facility: CLINIC | Age: 54
End: 2019-11-21

## 2019-11-21 VITALS
WEIGHT: 128 LBS | HEART RATE: 83 BPM | TEMPERATURE: 98.3 F | DIASTOLIC BLOOD PRESSURE: 78 MMHG | BODY MASS INDEX: 18.96 KG/M2 | HEIGHT: 69 IN | SYSTOLIC BLOOD PRESSURE: 114 MMHG | RESPIRATION RATE: 18 BRPM | OXYGEN SATURATION: 98 %

## 2019-11-21 DIAGNOSIS — G44.89 OTHER HEADACHE SYNDROME: Primary | ICD-10-CM

## 2019-11-21 PROBLEM — N52.9 IMPOTENCE OF ORGANIC ORIGIN: Status: ACTIVE | Noted: 2019-11-21

## 2019-11-21 PROBLEM — K21.9 GERD (GASTROESOPHAGEAL REFLUX DISEASE): Status: ACTIVE | Noted: 2019-11-21

## 2019-11-21 PROCEDURE — 99214 OFFICE O/P EST MOD 30 MIN: CPT | Performed by: FAMILY MEDICINE

## 2019-11-21 RX ORDER — SILDENAFIL 100 MG/1
TABLET, FILM COATED ORAL
COMMUNITY
Start: 2015-04-02 | End: 2019-11-21

## 2019-11-21 RX ORDER — GABAPENTIN 300 MG/1
CAPSULE ORAL
Qty: 90 CAPSULE | Refills: 5 | Status: SHIPPED | OUTPATIENT
Start: 2019-11-21 | End: 2020-06-22

## 2019-11-21 NOTE — PROGRESS NOTES
Subjective   Maninder Edwards is a 54 y.o. male.     Following up from appt on 8/9/19, reports headaches have become increasingly worse.           Tinnitus   The current episode started more than 1 year ago. The problem occurs constantly. Associated symptoms include headaches. Pertinent negatives include no abdominal pain, chest pain, fatigue, fever, joint swelling, nausea, neck pain, numbness, rash, swollen glands, vomiting or weakness.        The following portions of the patient's history were reviewed and updated as appropriate: allergies, current medications, past family history, past medical history, past social history, past surgical history and problem list.    Patient Active Problem List   Diagnosis   • Degenerative joint disease of shoulder, left   • Mixed hyperlipidemia   • GERD (gastroesophageal reflux disease)   • Impotence of organic origin   • Other headache syndrome       Current Outpatient Medications on File Prior to Visit   Medication Sig Dispense Refill   • ASPIRIN 81 PO Take 81 mg by mouth Daily.     • [DISCONTINUED] sildenafil (VIAGRA) 100 MG tablet Take  by mouth.       No current facility-administered medications on file prior to visit.        No Known Allergies    Review of Systems   Constitutional: Negative for activity change, appetite change, fatigue and fever.   HENT: Positive for tinnitus. Negative for ear pain, swollen glands and voice change.    Eyes: Negative for visual disturbance.   Respiratory: Negative for shortness of breath and wheezing.    Cardiovascular: Negative for chest pain and leg swelling.   Gastrointestinal: Negative for abdominal pain, blood in stool, constipation, diarrhea, nausea and vomiting.   Endocrine: Negative for polydipsia and polyuria.   Genitourinary: Negative for dysuria, frequency and hematuria.   Musculoskeletal: Negative for joint swelling, neck pain and neck stiffness.   Skin: Negative for rash and bruise.   Neurological: Negative for weakness, numbness  and headache.   Psychiatric/Behavioral: Negative for suicidal ideas and depressed mood.       Objective   Vitals:    11/21/19 0854   BP: 114/78   Pulse: 83   Resp: 18   Temp: 98.3 °F (36.8 °C)   SpO2: 98%     Physical Exam   Constitutional: He is oriented to person, place, and time. He appears well-developed and well-nourished.   Eyes: Conjunctivae and EOM are normal. Pupils are equal, round, and reactive to light.   Neck: Normal range of motion. Neck supple.   Cardiovascular: Normal rate, regular rhythm and normal heart sounds.   Pulmonary/Chest: Effort normal and breath sounds normal.   Abdominal: Soft. Bowel sounds are normal.   Musculoskeletal: Normal range of motion.   Neurological: He is alert and oriented to person, place, and time. He has normal strength. No cranial nerve deficit or sensory deficit. He displays a negative Romberg sign.   Normal neuro exam    Skin: Skin is warm and dry.   Psychiatric: He has a normal mood and affect. His behavior is normal. Judgment and thought content normal.         Assessment/Plan .  Problem List Items Addressed This Visit     Other headache syndrome - Primary    Current Assessment & Plan     Worsening. Recc CT. Consider neurology eval. Try gabapentin             Relevant Medications    gabapentin (NEURONTIN) 300 MG capsule    Other Relevant Orders    CT Head Without Contrast    Ambulatory Referral to Neurology      Findings discussed. All questions answered.  Differential diagnosis discussed.   Medication and medication adverse effects discussed.  Drug education given and explained to patient. Patient verbalized understanding.  Follow-up after testing complete, sooner for worsening symptoms or any concerns

## 2019-12-05 ENCOUNTER — TELEPHONE (OUTPATIENT)
Dept: FAMILY MEDICINE CLINIC | Facility: CLINIC | Age: 54
End: 2019-12-05

## 2019-12-05 NOTE — TELEPHONE ENCOUNTER
Please notify pt that his insurance denied his head CT and that he should be sure to see neurology

## 2019-12-05 NOTE — TELEPHONE ENCOUNTER
CT SCAN Head without contrast # [id number] Denied per [facility]IL[facility] after records reviewed.  YOUSUF Levine LPN

## 2019-12-24 ENCOUNTER — TREATMENT (OUTPATIENT)
Dept: PHYSICAL THERAPY | Facility: CLINIC | Age: 54
End: 2019-12-24

## 2019-12-24 DIAGNOSIS — Z96.612 STATUS POST REPLACEMENT OF LEFT SHOULDER JOINT: Primary | ICD-10-CM

## 2019-12-24 DIAGNOSIS — M25.512 ACUTE PAIN OF LEFT SHOULDER: ICD-10-CM

## 2019-12-24 PROCEDURE — 97140 MANUAL THERAPY 1/> REGIONS: CPT | Performed by: PHYSICAL THERAPIST

## 2019-12-24 PROCEDURE — 97110 THERAPEUTIC EXERCISES: CPT | Performed by: PHYSICAL THERAPIST

## 2019-12-24 NOTE — PROGRESS NOTES
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 States arm feels stiff but no pain.    Objective :  See Exercise, Manual, and Modality Logs for complete treatment.       Assessment/Plan : Good tolerance to ther ex with fatigue noted. Increased stiffness noted L shoulder, improved with stretching.    Progress per Plan of Care           Timed:         Manual Therapy:    10     mins  00111;     Therapeutic Exercise:    20     mins  38825;     Neuromuscular Astird:        mins  46408;    Therapeutic Activity:          mins  02698;     Gait Training:           mins  76206;     Ultrasound:          mins  40743;    Ionto                                   mins   85357  Self Care                            mins   68790  Canalith Repos                   mins  4209    Un-Timed:  Electrical Stimulation:         mins  97938 ( );  Dry Needling          mins self-pay  Traction          mins 62020  Low Eval          Mins  65731  Mod Eval          Mins  55701  High Eval                            Mins  33709    Timed Treatment:   30   mins   Total Treatment:     30   mins    Amparo Horton, PT  Physical Therapist

## 2020-01-02 ENCOUNTER — TREATMENT (OUTPATIENT)
Dept: PHYSICAL THERAPY | Facility: CLINIC | Age: 55
End: 2020-01-02

## 2020-01-02 DIAGNOSIS — M25.512 ACUTE PAIN OF LEFT SHOULDER: ICD-10-CM

## 2020-01-02 DIAGNOSIS — Z96.612 STATUS POST REPLACEMENT OF LEFT SHOULDER JOINT: Primary | ICD-10-CM

## 2020-01-02 PROCEDURE — 97140 MANUAL THERAPY 1/> REGIONS: CPT | Performed by: PHYSICAL THERAPIST

## 2020-01-02 PROCEDURE — 97110 THERAPEUTIC EXERCISES: CPT | Performed by: PHYSICAL THERAPIST

## 2020-01-02 NOTE — PROGRESS NOTES
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 States he did 100 push-ups yesterday in sets of 20. Pecs are sore today.    Objective :  See Exercise, Manual, and Modality Logs for complete treatment.       Assessment/Plan : Good tolerance to ther ex with scap muscular fatigue reported at end of session.     Progress per Plan of Care           Timed:         Manual Therapy:    10     mins  10237;     Therapeutic Exercise:    20     mins  36197;     Neuromuscular Astrid:        mins  59565;    Therapeutic Activity:          mins  03294;     Gait Training:           mins  23321;     Ultrasound:          mins  62671;    Ionto                                   mins   25247  Self Care                            mins   08825  Canalith Repos                   mins  4209    Un-Timed:  Electrical Stimulation:         mins  05240 ( );  Dry Needling          mins self-pay  Traction          mins 76893  Low Eval          Mins  61311  Mod Eval          Mins  32346  High Eval                            Mins  29756    Timed Treatment:   30   mins   Total Treatment:     30   mins    Amparo Horton, PT  Physical Therapist

## 2020-01-07 ENCOUNTER — TREATMENT (OUTPATIENT)
Dept: PHYSICAL THERAPY | Facility: CLINIC | Age: 55
End: 2020-01-07

## 2020-01-07 DIAGNOSIS — M25.512 ACUTE PAIN OF LEFT SHOULDER: ICD-10-CM

## 2020-01-07 DIAGNOSIS — Z96.612 STATUS POST REPLACEMENT OF LEFT SHOULDER JOINT: Primary | ICD-10-CM

## 2020-01-07 PROCEDURE — 97140 MANUAL THERAPY 1/> REGIONS: CPT | Performed by: PHYSICAL THERAPIST

## 2020-01-07 PROCEDURE — 97110 THERAPEUTIC EXERCISES: CPT | Performed by: PHYSICAL THERAPIST

## 2020-01-07 NOTE — PROGRESS NOTES
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 Has started doing HEP more regularly.  Subjective Questionnaire: not completed this date in error.  Clinical Progress: improved  Home Program Compliance: Yes  Treatment has included: therapeutic exercise, manual therapy, therapeutic activity, electrical stimulation, moist heat and cryotherapy    Objective : L shoulder flex AROM = 145 deg  Strength: flex and abd = 4-/5, IR = 4/5, ER = 4-/5    Assessment/Plan: Good tolerance to ther ex with fatigue noted. Needs continued scap strengthening for shoulder stabilization.  Progress toward previous goals: Partially Met    New Goals  Long-term goals (LTG): continue to work toward established goals.      Recommendations: Continue 1-2x/wk  Timeframe: 18 visits  Prognosis to achieve goals: good    PT Signature: Amparo Horton PT      Based upon review of the patient's progress and continued therapy plan, it is my medical opinion that Maninder Edwards should continue physical therapy treatment at [facility]  [address] [place] [address] [phone].    Signature: __________________________________  Nicho Castellano MD    Timed:         Manual Therapy:    8     mins  86038;     Therapeutic Exercise:    22     mins  91864;     Neuromuscular Astrid:        mins  04946;    Therapeutic Activity:          mins  42687;     Gait Training:           mins  36343;     Ultrasound:          mins  47901;    Ionto                                   mins   12068  Self Care                            mins   04248  [facility]                    mins  4209    Un-Timed:  Electrical Stimulation:         mins  34468 ( );  Dry Needling          mins self-pay  Traction          mins 37143  Low Eval          Mins  53197  Mod Eval          Mins  59035  High Eval                            Mins  55362  Re-Eval                               mins  87855      Timed Treatment:   30   mins   Total Treatment:     30   mins

## 2020-01-14 ENCOUNTER — TREATMENT (OUTPATIENT)
Dept: PHYSICAL THERAPY | Facility: CLINIC | Age: 55
End: 2020-01-14

## 2020-01-14 DIAGNOSIS — M25.512 ACUTE PAIN OF LEFT SHOULDER: ICD-10-CM

## 2020-01-14 DIAGNOSIS — Z96.612 STATUS POST REPLACEMENT OF LEFT SHOULDER JOINT: Primary | ICD-10-CM

## 2020-01-14 PROCEDURE — 97110 THERAPEUTIC EXERCISES: CPT | Performed by: PHYSICAL THERAPIST

## 2020-01-14 NOTE — PROGRESS NOTES
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 Has been doing HEP more regularly.    Objective :   See Exercise, Manual, and Modality Logs for complete treatment.       Assessment/Plan : Good tolerance to ther ex with no reports of pain. Fatigued at end of session. Progressing well. Will benefit from continued scap and shoulder strengthening.    Progress per Plan of Care           Timed:         Manual Therapy:    7     mins  18928;     Therapeutic Exercise:    24     mins  39201;     Neuromuscular Astrid:        mins  27846;    Therapeutic Activity:          mins  38727;     Gait Training:           mins  83523;     Ultrasound:          mins  83502;    Ionto                                   mins   54046  Self Care                            mins   03320  Canalith Repos                   mins  4209    Un-Timed:  Electrical Stimulation:         mins  16641 ( );  Dry Needling          mins self-pay  Traction          mins 44461  Low Eval          Mins  97565  Mod Eval          Mins  20694  High Eval                            Mins  33536    Timed Treatment:   31   mins   Total Treatment:     31   mins    Amparo Horton, PT  Physical Therapist

## 2020-01-21 ENCOUNTER — TREATMENT (OUTPATIENT)
Dept: PHYSICAL THERAPY | Facility: CLINIC | Age: 55
End: 2020-01-21

## 2020-01-21 DIAGNOSIS — M25.512 ACUTE PAIN OF LEFT SHOULDER: ICD-10-CM

## 2020-01-21 DIAGNOSIS — Z96.612 STATUS POST REPLACEMENT OF LEFT SHOULDER JOINT: Primary | ICD-10-CM

## 2020-01-21 PROCEDURE — 97110 THERAPEUTIC EXERCISES: CPT | Performed by: PHYSICAL THERAPIST

## 2020-01-21 NOTE — PROGRESS NOTES
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    Objective :   See Exercise, Manual, and Modality Logs for complete treatment.       Assessment/Plan : Good tolerance to exercise progression with no reports of pain. Fatigued with ther ex.     Progress per Plan of Care           Timed:         Manual Therapy:    7     mins  92665;     Therapeutic Exercise:    23     mins  46615;     Neuromuscular Astrid:        mins  81060;    Therapeutic Activity:          mins  97973;     Gait Training:           mins  07736;     Ultrasound:          mins  26300;    Ionto                                   mins   63439  Self Care                            mins   45173  Canalith Repos                   mins  4209    Un-Timed:  Electrical Stimulation:         mins  44053 ( );  Dry Needling          mins self-pay  Traction          mins 53253  Low Eval          Mins  98261  Mod Eval          Mins  00335  High Eval                            Mins  32476    Timed Treatment:   30   mins   Total Treatment:     30   mins    Amparo Horton, PT  Physical Therapist

## 2020-02-12 ENCOUNTER — TREATMENT (OUTPATIENT)
Dept: PHYSICAL THERAPY | Facility: CLINIC | Age: 55
End: 2020-02-12

## 2020-02-12 DIAGNOSIS — Z96.612 STATUS POST REPLACEMENT OF LEFT SHOULDER JOINT: Primary | ICD-10-CM

## 2020-02-12 DIAGNOSIS — M25.512 ACUTE PAIN OF LEFT SHOULDER: ICD-10-CM

## 2020-02-12 PROCEDURE — 97110 THERAPEUTIC EXERCISES: CPT | Performed by: PHYSICAL THERAPIST

## 2020-02-12 NOTE — PROGRESS NOTES
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 States having stiffness in shoulder but no pain currently. Reports he did too much on bench press recently and shoulder ached afterwards, but he did the same the next day also. Pt states he hopes that insurance will approve continued PT in order to further progress strength and improve flexibility.    Objective : L shoulder flex AROM = 145 deg. Flex and abd strength = 4-/5 to 4/5.  See Exercise, Manual, and Modality Logs for complete treatment.       Assessment/Plan : Good tolerance to all ther ex with no reports of pain. Fatigue noted. Pt has met 5/6 goals.  STG:  Increase L shoulder PROM to 130 deg in 2 weeks. - MET  Pt to demonstrate full elbow AROM in 2 weeks. - MET    LTG:  Improve Quick DASH to 20% or less impairment by discharge - MET  Increase L shoulder strength to 4/5 or greater by discharge - Partially met  Increase L shoulder flex AROM to 140 deg by discharge - MET  Pt to be independent with HEP by discharge - MET    Progress per Plan of Care at [facility] of 1x/wk pending insurance approval for more visits.           Timed:         Manual Therapy:    7     mins  91109;     Therapeutic Exercise:    24     mins  52344;     Neuromuscular Astrid:        mins  15242;    Therapeutic Activity:          mins  16898;     Gait Training:           mins  03431;     Ultrasound:          mins  41922;    Ionto                                   mins   60892  Self Care                            mins   03870  Canalith Repos                   mins  4209    Un-Timed:  Electrical  Stimulation:         mins  89659 ( );  Dry Needling          mins self-pay  Traction          mins 94443  Low Eval          Mins  58158  Mod Eval          Mins  46655  High Eval                            Mins  89268    Timed Treatment:   31   mins   Total Treatment:     31   mins    Amparo Horton, PT  Physical Therapist

## 2020-03-23 ENCOUNTER — DOCUMENTATION (OUTPATIENT)
Dept: PHYSICAL THERAPY | Facility: CLINIC | Age: 55
End: 2020-03-23

## 2020-03-23 DIAGNOSIS — Z96.612 STATUS POST REPLACEMENT OF LEFT SHOULDER JOINT: Primary | ICD-10-CM

## 2020-03-23 DIAGNOSIS — M25.512 ACUTE PAIN OF LEFT SHOULDER: ICD-10-CM

## 2020-03-23 NOTE — PROGRESS NOTES
Discharge Summary  Discharge Summary from Physical Therapy Report      Dates  PT visit: 6/17/2019-02/12/2020  Number of Visits: 27     Discharge Status of Patient: See MD Note dated 2/12/2020    Goals: All Met    Discharge Plan: Continue with current home exercise program as instructed    Comments : At last visit pt reported doing push-ups without pain.    Date of Discharge 3/23/2020        Amparo Horton, PT  Physical Therapist

## 2020-04-23 ENCOUNTER — OFFICE VISIT (OUTPATIENT)
Dept: NEUROLOGY | Facility: CLINIC | Age: 55
End: 2020-04-23

## 2020-04-23 VITALS
TEMPERATURE: 97.7 F | BODY MASS INDEX: 19.34 KG/M2 | DIASTOLIC BLOOD PRESSURE: 68 MMHG | SYSTOLIC BLOOD PRESSURE: 103 MMHG | HEIGHT: 69 IN | HEART RATE: 61 BPM | WEIGHT: 130.6 LBS

## 2020-04-23 DIAGNOSIS — G43.019 INTRACTABLE MIGRAINE WITHOUT AURA AND WITHOUT STATUS MIGRAINOSUS: Primary | ICD-10-CM

## 2020-04-23 PROBLEM — G43.909 MIGRAINE: Status: ACTIVE | Noted: 2020-04-23

## 2020-04-23 PROBLEM — H52.03 HYPERMETROPIA OF BOTH EYES: Status: ACTIVE | Noted: 2020-04-23

## 2020-04-23 PROBLEM — H52.00 HYPERMETROPIA: Status: ACTIVE | Noted: 2019-01-15

## 2020-04-23 PROBLEM — H52.4 PRESBYOPIA OF BOTH EYES: Status: ACTIVE | Noted: 2020-04-23

## 2020-04-23 PROBLEM — H35.361 DRUSEN (DEGENERATIVE) OF MACULA, RIGHT EYE: Status: ACTIVE | Noted: 2020-04-23

## 2020-04-23 PROCEDURE — 99204 OFFICE O/P NEW MOD 45 MIN: CPT | Performed by: PSYCHIATRY & NEUROLOGY

## 2020-04-23 RX ORDER — AMITRIPTYLINE HYDROCHLORIDE 10 MG/1
TABLET, FILM COATED ORAL
Qty: 90 TABLET | Refills: 5 | Status: SHIPPED | OUTPATIENT
Start: 2020-04-23 | End: 2020-10-28

## 2020-04-23 RX ORDER — BUPROPION HYDROCHLORIDE 150 MG/1
TABLET, EXTENDED RELEASE ORAL
COMMUNITY
End: 2020-04-23

## 2020-04-23 RX ORDER — SUMATRIPTAN 100 MG/1
TABLET, FILM COATED ORAL
Qty: 9 TABLET | Refills: 11 | Status: SHIPPED | OUTPATIENT
Start: 2020-04-23 | End: 2021-04-26 | Stop reason: SDUPTHER
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
## 2020-05-18 ENCOUNTER — HOSPITAL ENCOUNTER (OUTPATIENT)
Dept: MRI IMAGING | Facility: HOSPITAL | Age: 55
Discharge: HOME | End: 2020-05-18
Admitting: PSYCHIATRY & NEUROLOGY

## 2020-05-18 DIAGNOSIS — G43.019 INTRACTABLE MIGRAINE WITHOUT AURA AND WITHOUT STATUS MIGRAINOSUS: ICD-10-CM

## 2020-05-18 PROCEDURE — 25010000002 GADOTERIDOL PER 1 ML: Performed by: PSYCHIATRY & NEUROLOGY

## 2020-05-18 PROCEDURE — 70553 MRI BRAIN STEM W/O & W/DYE: CPT

## 2020-05-18 PROCEDURE — A9579 GAD-BASE MR CONTRAST NOS,1ML: HCPCS | Performed by: PSYCHIATRY & NEUROLOGY

## 2020-05-18 RX ADMIN — GADOTERIDOL 15 ML: 279.3 INJECTION, SOLUTION INTRAVENOUS at 10:13

## 2020-06-21 DIAGNOSIS — G44.89 OTHER HEADACHE SYNDROME: ICD-10-CM

## 2020-06-22 RX ORDER — GABAPENTIN 300 MG/1
CAPSULE ORAL
Qty: 90 CAPSULE | Refills: 1 | Status: SHIPPED | OUTPATIENT
Start: 2020-06-22 | End: 2020-08-27

## 2020-07-14 ENCOUNTER — HOSPITAL ENCOUNTER (OUTPATIENT)
Dept: GENERAL RADIOLOGY | Facility: HOSPITAL | Age: 55
Discharge: HOME | End: 2020-07-14
Admitting: FAMILY MEDICINE

## 2020-07-14 ENCOUNTER — OFFICE VISIT (OUTPATIENT)
Dept: FAMILY MEDICINE CLINIC | Facility: CLINIC | Age: 55
End: 2020-07-14

## 2020-07-14 ENCOUNTER — TELEPHONE (OUTPATIENT)
Dept: FAMILY MEDICINE CLINIC | Facility: CLINIC | Age: 55
End: 2020-07-14

## 2020-07-14 VITALS
OXYGEN SATURATION: 97 % | DIASTOLIC BLOOD PRESSURE: 66 MMHG | BODY MASS INDEX: 18.96 KG/M2 | TEMPERATURE: 98.5 F | HEIGHT: 69 IN | SYSTOLIC BLOOD PRESSURE: 114 MMHG | HEART RATE: 68 BPM | RESPIRATION RATE: 18 BRPM | WEIGHT: 128 LBS

## 2020-07-14 DIAGNOSIS — M25.561 PAIN IN BOTH KNEES, UNSPECIFIED CHRONICITY: ICD-10-CM

## 2020-07-14 DIAGNOSIS — Z12.5 PROSTATE CANCER SCREENING: ICD-10-CM

## 2020-07-14 DIAGNOSIS — Z00.00 ANNUAL PHYSICAL EXAM: Primary | ICD-10-CM

## 2020-07-14 DIAGNOSIS — M25.50 ARTHRALGIA, UNSPECIFIED JOINT: ICD-10-CM

## 2020-07-14 DIAGNOSIS — M25.562 PAIN IN BOTH KNEES, UNSPECIFIED CHRONICITY: ICD-10-CM

## 2020-07-14 PROCEDURE — 99213 OFFICE O/P EST LOW 20 MIN: CPT | Performed by: FAMILY MEDICINE

## 2020-07-14 PROCEDURE — 99396 PREV VISIT EST AGE 40-64: CPT | Performed by: FAMILY MEDICINE

## 2020-07-14 PROCEDURE — 73565 X-RAY EXAM OF KNEES: CPT

## 2020-07-14 RX ORDER — MELOXICAM 15 MG/1
15 TABLET ORAL DAILY
Qty: 30 TABLET | Refills: 12 | Status: SHIPPED | OUTPATIENT
Start: 2020-07-14

## 2020-07-14 NOTE — TELEPHONE ENCOUNTER
----- Message from Deep Yo MD sent at 7/14/2020 11:48 AM EDT -----  Please notify patient that xray looked ok

## 2020-07-14 NOTE — PROGRESS NOTES
Subjective   Maninder Edwards is a 54 y.o. male.     The patient is here: for coordination of medical care and annual wellness examination.   Last colonoscopy 2/2015 and normal per pt report  No known trauma    Leg Pain    Incident onset: multiple years, pain has recently become worse and  more constant.  There was no injury mechanism. The pain is present in the left knee, right foot, right ankle, left foot, right heel and left heel. The quality of the pain is described as aching. The pain has been constant since onset. Associated symptoms include numbness and tingling. Associated symptoms comments: Feels like arches are falling. Swelling. . The symptoms are aggravated by weight bearing.        The following portions of the patient's history were reviewed and updated as appropriate: allergies, current medications, past family history, past medical history, past social history, past surgical history and problem list.    Patient Active Problem List   Diagnosis   • Degenerative joint disease of shoulder, left   • Mixed hyperlipidemia   • GERD (gastroesophageal reflux disease)   • Impotence of organic origin   • Other headache syndrome   • Drusen (degenerative) of macula, right eye   • Hypermetropia   • Presbyopia of both eyes   • Hypermetropia of both eyes   • Migraine       Current Outpatient Medications on File Prior to Visit   Medication Sig Dispense Refill   • amitriptyline (ELAVIL) 10 MG tablet One at hs Increase as tolerated to three at hs 90 tablet 5   • gabapentin (NEURONTIN) 300 MG capsule TAKE 1 TO 3 CAPSULES BY MOUTH AT BEDTIME 90 capsule 1   • SUMAtriptan (IMITREX) 100 MG tablet Take one tablet at onset of headache. May repeat dose one time in 2 hours if headache not relieved. 9 tablet 11     No current facility-administered medications on file prior to visit.      Current outpatient and discharge medications have been reconciled for the patient.  Reviewed by: Deep Yo MD      No Known  Allergies    Review of Systems   Constitutional: Negative for activity change, appetite change, fatigue and fever.   HENT: Negative for ear pain, swollen glands and voice change.    Eyes: Negative for visual disturbance.   Respiratory: Negative for shortness of breath and wheezing.    Cardiovascular: Negative for chest pain and leg swelling.   Gastrointestinal: Negative for abdominal pain, blood in stool, constipation, diarrhea, nausea and vomiting.   Endocrine: Negative for polydipsia and polyuria.   Genitourinary: Negative for dysuria, frequency and hematuria.   Musculoskeletal: Negative for joint swelling, neck pain and neck stiffness.   Skin: Negative for rash and bruise.   Neurological: Positive for tingling and numbness. Negative for weakness and headache.   Psychiatric/Behavioral: Negative for suicidal ideas and depressed mood.     I have reviewed and confirmed the accuracy of the ROS as documented by the MA/LPN/RN Deep Yo MD    Objective   Vitals:    07/14/20 0931   BP: 114/66   Pulse: 68   Resp: 18   Temp: 98.5 °F (36.9 °C)   SpO2: 97%     Physical Exam   Constitutional: He is oriented to person, place, and time. He appears well-developed and well-nourished.   Eyes: Pupils are equal, round, and reactive to light. Conjunctivae and EOM are normal.   Neck: Normal range of motion. Neck supple.   Cardiovascular: Normal rate, regular rhythm and normal heart sounds.   Pulmonary/Chest: Effort normal and breath sounds normal.   Abdominal: Soft. Bowel sounds are normal.   Musculoskeletal: Normal range of motion.   Tender to palpation bilat heels. Knee exam normal - no effusion   Neurological: He is alert and oriented to person, place, and time.   Skin: Skin is warm and dry.   Psychiatric: He has a normal mood and affect. His behavior is normal. Judgment and thought content normal.     I wore protective equipment throughout this patient encounter to include mask. Hand hygiene was performed before  donning protective equipment and after removal when leaving the room.    Assessment/Plan .  Problem List Items Addressed This Visit     None      Visit Diagnoses     Annual physical exam    -  Primary    Relevant Orders    CBC Auto Differential    Comprehensive Metabolic Panel    Lipid Panel With / Chol / HDL Ratio    PSA Screen    Arthralgia, unspecified joint        Relevant Medications    meloxicam (Mobic) 15 MG tablet    Other Relevant Orders    Rheumatoid Factor    Sedimentation Rate    KATHRINE    TSH    Uric Acid    C-reactive Protein    XR Knee Bilateral AP Standing    Prostate cancer screening        Relevant Orders    PSA Screen    Pain in both knees, unspecified chronicity        Relevant Medications    meloxicam (Mobic) 15 MG tablet      Discussed anticipatory guidance, diet, exercise, and weight loss.  Discussed safety and routine screening examinations.  Discussed self-examinations.  Findings discussed. All questions answered.  Differential diagnosis discussed.   Follow-up in 4-6 weeks if not better.  Follow-up sooner for worsening symptoms or for any concerns.

## 2020-07-15 LAB
ALBUMIN SERPL-MCNC: 4.2 G/DL (ref 3.8–4.9)
ALBUMIN/GLOB SERPL: 1.4 {RATIO} (ref 1.2–2.2)
ALP SERPL-CCNC: 112 IU/L (ref 39–117)
ALT SERPL-CCNC: 12 IU/L (ref 0–44)
ANA SER QL: NEGATIVE
AST SERPL-CCNC: 14 IU/L (ref 0–40)
BASOPHILS # BLD AUTO: 0.1 X10E3/UL (ref 0–0.2)
BASOPHILS NFR BLD AUTO: 1 %
BILIRUB SERPL-MCNC: <0.2 MG/DL (ref 0–1.2)
BUN SERPL-MCNC: 29 MG/DL (ref 6–24)
BUN/CREAT SERPL: 22 (ref 9–20)
CALCIUM SERPL-MCNC: 9.9 MG/DL (ref 8.7–10.2)
CHLORIDE SERPL-SCNC: 97 MMOL/L (ref 96–106)
CHOLEST SERPL-MCNC: 209 MG/DL (ref 100–199)
CHOLEST/HDLC SERPL: 3.1 RATIO (ref 0–5)
CO2 SERPL-SCNC: 29 MMOL/L (ref 20–29)
CREAT SERPL-MCNC: 1.29 MG/DL (ref 0.76–1.27)
CRP SERPL-MCNC: <1 MG/L (ref 0–10)
EOSINOPHIL # BLD AUTO: 0.1 X10E3/UL (ref 0–0.4)
EOSINOPHIL NFR BLD AUTO: 2 %
ERYTHROCYTE [DISTWIDTH] IN BLOOD BY AUTOMATED COUNT: 12.9 % (ref 11.6–15.4)
ERYTHROCYTE [SEDIMENTATION RATE] IN BLOOD BY WESTERGREN METHOD: 2 MM/HR (ref 0–30)
GLOBULIN SER CALC-MCNC: 2.9 G/DL (ref 1.5–4.5)
GLUCOSE SERPL-MCNC: 93 MG/DL (ref 65–99)
HCT VFR BLD AUTO: 45.4 % (ref 37.5–51)
HDLC SERPL-MCNC: 67 MG/DL
HGB BLD-MCNC: 15 G/DL (ref 13–17.7)
IMM GRANULOCYTES # BLD AUTO: 0 X10E3/UL (ref 0–0.1)
IMM GRANULOCYTES NFR BLD AUTO: 0 %
LDLC SERPL CALC-MCNC: 126 MG/DL (ref 0–99)
LYMPHOCYTES # BLD AUTO: 2 X10E3/UL (ref 0.7–3.1)
LYMPHOCYTES NFR BLD AUTO: 31 %
MCH RBC QN AUTO: 30.4 PG (ref 26.6–33)
MCHC RBC AUTO-ENTMCNC: 33 G/DL (ref 31.5–35.7)
MCV RBC AUTO: 92 FL (ref 79–97)
MONOCYTES # BLD AUTO: 0.6 X10E3/UL (ref 0.1–0.9)
MONOCYTES NFR BLD AUTO: 9 %
NEUTROPHILS # BLD AUTO: 3.8 X10E3/UL (ref 1.4–7)
NEUTROPHILS NFR BLD AUTO: 57 %
PLATELET # BLD AUTO: 303 X10E3/UL (ref 150–450)
POTASSIUM SERPL-SCNC: 5.5 MMOL/L (ref 3.5–5.2)
PROT SERPL-MCNC: 7.1 G/DL (ref 6–8.5)
PSA SERPL-MCNC: 1.8 NG/ML (ref 0–4)
RBC # BLD AUTO: 4.93 X10E6/UL (ref 4.14–5.8)
RHEUMATOID FACT SERPL-ACNC: <10 IU/ML (ref 0–13.9)
SODIUM SERPL-SCNC: 138 MMOL/L (ref 134–144)
TRIGL SERPL-MCNC: 78 MG/DL (ref 0–149)
TSH SERPL DL<=0.005 MIU/L-ACNC: 1.58 UIU/ML (ref 0.45–4.5)
URATE SERPL-MCNC: 4.1 MG/DL (ref 3.7–8.6)
VLDLC SERPL CALC-MCNC: 16 MG/DL (ref 5–40)
WBC # BLD AUTO: 6.7 X10E3/UL (ref 3.4–10.8)

## 2020-08-26 DIAGNOSIS — G44.89 OTHER HEADACHE SYNDROME: ICD-10-CM

## 2020-08-27 RX ORDER — GABAPENTIN 300 MG/1
CAPSULE ORAL
Qty: 90 CAPSULE | Refills: 1 | Status: SHIPPED | OUTPATIENT
Start: 2020-08-27 | End: 2020-10-30

## 2020-08-31 ENCOUNTER — OFFICE VISIT (OUTPATIENT)
Dept: FAMILY MEDICINE CLINIC | Facility: CLINIC | Age: 55
End: 2020-08-31

## 2020-08-31 ENCOUNTER — TELEPHONE (OUTPATIENT)
Dept: FAMILY MEDICINE CLINIC | Facility: CLINIC | Age: 55
End: 2020-08-31

## 2020-08-31 ENCOUNTER — HOSPITAL ENCOUNTER (OUTPATIENT)
Dept: GENERAL RADIOLOGY | Facility: HOSPITAL | Age: 55
Discharge: HOME | End: 2020-08-31
Admitting: FAMILY MEDICINE

## 2020-08-31 VITALS
WEIGHT: 124 LBS | SYSTOLIC BLOOD PRESSURE: 112 MMHG | HEART RATE: 88 BPM | OXYGEN SATURATION: 98 % | TEMPERATURE: 97.8 F | RESPIRATION RATE: 18 BRPM | DIASTOLIC BLOOD PRESSURE: 78 MMHG | BODY MASS INDEX: 18.37 KG/M2 | HEIGHT: 69 IN

## 2020-08-31 DIAGNOSIS — M54.32 BILATERAL SCIATICA: ICD-10-CM

## 2020-08-31 DIAGNOSIS — M72.2 PLANTAR FASCIITIS: Primary | ICD-10-CM

## 2020-08-31 DIAGNOSIS — M54.31 BILATERAL SCIATICA: ICD-10-CM

## 2020-08-31 PROCEDURE — 99214 OFFICE O/P EST MOD 30 MIN: CPT | Performed by: FAMILY MEDICINE

## 2020-08-31 PROCEDURE — 72110 X-RAY EXAM L-2 SPINE 4/>VWS: CPT

## 2020-08-31 NOTE — PROGRESS NOTES
"Subjective   Maninder Edwards is a 54 y.o. male.     Following up from [facility] on 7/14/20, reports arches of both feet feel like they are \"falling\" down. No new symptoms, pain has worsened this past month.     Leg Pain    Incident onset: multiple years, pain has recently become worse and  more constant.  There was no injury mechanism. The pain is present in the left knee, right foot, right ankle, left foot, right heel and left heel. The quality of the pain is described as aching. The pain has been constant since onset. Associated symptoms include numbness and tingling. Associated symptoms comments: Feels like arches are falling. Swelling. . The symptoms are aggravated by weight bearing.        The following portions of the patient's history were reviewed and updated as appropriate: allergies, current medications, past family history, past medical history, past social history, past surgical history and problem list.    Patient Active Problem List   Diagnosis   • Degenerative joint disease of shoulder, left   • Mixed hyperlipidemia   • GERD (gastroesophageal reflux disease)   • Impotence of organic origin   • Other headache syndrome   • Drusen (degenerative) of macula, right eye   • Hypermetropia   • Presbyopia of both eyes   • Hypermetropia of both eyes   • Migraine       Current Outpatient Medications on File Prior to Visit   Medication Sig Dispense Refill   • amitriptyline (ELAVIL) 10 MG tablet One at hs Increase as tolerated to three at hs 90 tablet 5   • gabapentin (NEURONTIN) 300 MG capsule TAKE 1-3 CAPSULES BY MOUTH AT BEDTIME 90 capsule 1   • meloxicam (Mobic) 15 MG tablet Take 1 tablet by mouth Daily. 30 tablet 12   • SUMAtriptan (IMITREX) 100 MG tablet Take one tablet at onset of headache. May repeat dose one time in 2 hours if headache not relieved. 9 tablet 11     No current facility-administered medications on file prior to visit.      Current outpatient and discharge medications have been reconciled for the " patient.  Reviewed by: Deep Yo MD      No Known Allergies    Review of Systems   Constitutional: Negative for activity change, appetite change, fatigue and fever.   HENT: Negative for ear pain, swollen glands and voice change.    Eyes: Negative for visual disturbance.   Respiratory: Negative for shortness of breath and wheezing.    Cardiovascular: Negative for chest pain and leg swelling.   Gastrointestinal: Negative for abdominal pain, blood in stool, constipation, diarrhea, nausea and vomiting.   Endocrine: Negative for polydipsia and polyuria.   Genitourinary: Negative for dysuria, frequency and hematuria.   Musculoskeletal: Positive for arthralgias and back pain. Negative for joint swelling, neck pain and neck stiffness.   Skin: Negative for rash and bruise.   Neurological: Positive for tingling and numbness. Negative for weakness and headache.   Psychiatric/Behavioral: Negative for suicidal ideas and depressed mood.     I have reviewed and confirmed the accuracy of the ROS as documented by the MA/LPN/RN Deep Yo MD      Objective   Vitals:    08/31/20 1134   BP: 112/78   Pulse: 88   Resp: 18   Temp: 97.8 °F (36.6 °C)   SpO2: 98%     Physical Exam   Constitutional: He is oriented to person, place, and time. He appears well-developed and well-nourished.   Eyes: Pupils are equal, round, and reactive to light. Conjunctivae and EOM are normal.   Neck: Normal range of motion. Neck supple.   Cardiovascular: Normal rate, regular rhythm and normal heart sounds.   Pulmonary/Chest: Effort normal and breath sounds normal.   Abdominal: Soft. Bowel sounds are normal.   Musculoskeletal:   Percussion lumbar spine causes dysesthesia in lower legs bilat  Palpation heels causes pain bilat.     Neurological: He is alert and oriented to person, place, and time. He has normal strength and normal reflexes. No cranial nerve deficit or sensory deficit.   Reflex Scores:       Patellar reflexes are 2+ on the  right side and 2+ on the left side.       Achilles reflexes are 2+ on the right side and 2+ on the left side.  Skin: Skin is warm and dry.   Psychiatric: He has a normal mood and affect. His behavior is normal. Judgment and thought content normal.       I wore protective equipment throughout this patient encounter to include mask. Hand hygiene was performed before donning protective equipment and after removal when leaving the room.    Assessment/Plan .  Maninder was seen today for leg pain.    Diagnoses and all orders for this visit:    Plantar fasciitis  -     Ambulatory Referral to Podiatry    Bilateral sciatica  -     XR Spine Lumbar Complete 4+VW    Continue mobic.  Podiatry referral for tx options for probable plantar fascitis. Discussed gentle stretching, ice.   Will get xray lumbar spine to eval for possible DJD.  Follow-up for routine health maintenance as directed

## 2020-08-31 NOTE — TELEPHONE ENCOUNTER
----- Message from Deep Yo MD sent at 8/31/2020  2:42 PM EDT -----  Please notify patient that xray showed some arthritis. May need MRI if sx persist

## 2020-09-15 ENCOUNTER — OFFICE VISIT (OUTPATIENT)
Dept: PODIATRY | Facility: CLINIC | Age: 55
End: 2020-09-15

## 2020-09-15 VITALS
HEART RATE: 76 BPM | DIASTOLIC BLOOD PRESSURE: 73 MMHG | SYSTOLIC BLOOD PRESSURE: 117 MMHG | HEIGHT: 69 IN | BODY MASS INDEX: 18.66 KG/M2 | WEIGHT: 126 LBS

## 2020-09-15 DIAGNOSIS — M72.2 PLANTAR FASCIITIS, BILATERAL: ICD-10-CM

## 2020-09-15 DIAGNOSIS — M79.671 BILATERAL FOOT PAIN: Primary | ICD-10-CM

## 2020-09-15 DIAGNOSIS — M79.672 BILATERAL FOOT PAIN: Primary | ICD-10-CM

## 2020-09-15 PROCEDURE — 20550 NJX 1 TENDON SHEATH/LIGAMENT: CPT | Performed by: PODIATRIST

## 2020-09-15 PROCEDURE — 76942 ECHO GUIDE FOR BIOPSY: CPT | Performed by: PODIATRIST

## 2020-09-15 PROCEDURE — 99203 OFFICE O/P NEW LOW 30 MIN: CPT | Performed by: PODIATRIST

## 2020-09-15 RX ORDER — TRIAMCINOLONE ACETONIDE 40 MG/ML
40 INJECTION, SUSPENSION INTRA-ARTICULAR; INTRAMUSCULAR ONCE
Status: COMPLETED | OUTPATIENT
Start: 2020-09-15 | End: 2020-09-15

## 2020-09-15 RX ADMIN — TRIAMCINOLONE ACETONIDE 40 MG: 40 INJECTION, SUSPENSION INTRA-ARTICULAR; INTRAMUSCULAR at 09:04

## 2020-09-16 NOTE — PROGRESS NOTES
09/15/2020  Foot and Ankle Surgery - New Patient   Provider: Dr. Romeo Funes DPM  Location: [facility]    Subjective:  Maninder Edwards is a 54 y.o. male.     Chief Complaint   Patient presents with   • Right Foot - Pain   • Left Foot - Pain       HPI: Patient is a pleasant 54-year-old male that presents with longstanding discomfort involving both feet.  He states that he has dealt with this issue intermittently for several years.  He localizes the pain to the plantar aspect of the arch and heels.  Symptoms are noticed with first few steps in the morning and after long periods of activity.  He states that he used to be a runner but has not been doing it due to the discomfort.  He does notice improvement with rest.  He rates the pain an 8 out of 10 most days.  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    No Known Allergies    Past Medical History:   Diagnosis Date   • Degenerative joint disease of shoulder    • GERD (gastroesophageal reflux disease)    • Hypermetropia    • Impotence of organic origin    • Migraine    • Mixed hyperlipidemia    • Presbyopia        Past Surgical History:   Procedure Laterality Date   • TOTAL SHOULDER REPLACEMENT Left 05/2019       Family History   Problem Relation Age of Onset   • No Known Problems Mother    • Heart disease Father        Social History     Socioeconomic History   • Marital status:      Spouse name: Not on file   • Number of children: Not on file   • Years of education: Not on file   • Highest education level: Not on file   Tobacco Use   • Smoking status: Former Smoker   • Smokeless tobacco: Never Used   Substance and Sexual Activity   • Alcohol use: No     Frequency: Never   • Drug use: Never   • Sexual activity: Yes     Partners: Female        Current Outpatient Medications on File Prior to Visit   Medication Sig Dispense Refill   • amitriptyline (ELAVIL) 10 MG tablet One at hs Increase as tolerated to three at  "hs 90 tablet 5   • gabapentin (NEURONTIN) 300 MG capsule TAKE 1-3 CAPSULES BY MOUTH AT BEDTIME 90 capsule 1   • meloxicam (Mobic) 15 MG tablet Take 1 tablet by mouth Daily. 30 tablet 12   • SUMAtriptan (IMITREX) 100 MG tablet Take one tablet at onset of headache. May repeat dose one time in 2 hours if headache not relieved. 9 tablet 11     No current facility-administered medications on file prior to visit.        Review of Systems:  General: Denies fever, chills, fatigue, and weakness.  Eyes: Denies vision loss, blurry vision, and excessive redness.  ENT: Denies hearing issues and difficulty swallowing.  Cardiovascular: Denies palpitations, chest pain, or syncopal episodes.  Respiratory: Denies shortness of breath, wheezing, and coughing.  GI: Denies abdominal pain, nausea, and vomiting.   : Denies frequency, hematuria, and urgency.  Musculoskeletal: + Bilateral foot pain  Derm: Denies rash, open wounds, or suspicious lesions.  Neuro: Denies headaches, numbness, loss of coordination, and tremors.  Psych: Denies anxiety and depression.  Endocrine: Denies temperature intolerance and changes in appetite.  Heme: Denies bleeding disorders or abnormal bruising.     Objective   /73   Pulse 76   Ht 175.3 cm (69\")   Wt 57.2 kg (126 lb)   BMI 18.61 kg/m²     Foot/Ankle Exam:       General:   Appearance: appears stated age and healthy    Orientation: AAOx3    Affect: appropriate    Gait: antalgic      VASCULAR      Right Foot Vascularity   Normal vascular exam    Dorsalis pedis:  2+  Posterior tibial:  2+  Skin Temperature: warm    Edema Grading:  None  CFT:  < 3 seconds  Pedal Hair Growth:  Present  Varicosities: none       Left Foot Vascularity   Normal vascular exam    Dorsalis pedis:  2+  Posterior tibial:  2+  Skin Temperature: warm    Edema Grading:  None  CFT:  < 3 seconds  Pedal Hair Growth:  Present  Varicosities: none        NEUROLOGIC     Right Foot Neurologic   Light touch sensation:  Normal  Hot/Cold " sensation: normal    Achilles reflex:  2+     Left Foot Neurologic   Light touch sensation:  Normal  Hot/cold sensation: normal    Achilles reflex:  2+     MUSCULOSKELETAL      Right Foot Musculoskeletal   Ecchymosis:  None  Tenderness: plantar fascia and plantar heel    Arch:  Normal     Left Foot Musculoskeletal   Ecchymosis:  None  Tenderness: plantar fascia and plantar heel       MUSCLE STRENGTH     Right Foot Muscle Strength   Normal strength    Foot dorsiflexion:  5  Foot plantar flexion:  5  Foot inversion:  5  Foot eversion:  5     Left Foot Muscle Strength   Normal strength    Foot dorsiflexion:  5  Foot plantar flexion:  5  Foot inversion:  5  Foot eversion:  5     RANGE OF MOTION      Right Foot Range of Motion   Foot and ankle ROM within normal limits       Left Foot Range of Motion   Foot and ankle ROM within normal limits       DERMATOLOGIC     Right Foot Dermatologic   Skin: skin intact       Left Foot Dermatologic   Skin: skin intact        Right Foot Additional Comments No gross deformity or instability.  Moderate discomfort with palpation to the plantar medial calcaneal tuberosity and moderate equinus contracture, bilateral      Assessment/Plan   Maninder was seen today for pain and pain.    Diagnoses and all orders for this visit:    Bilateral foot pain  -     XR Foot 3+ View Bilateral  -     triamcinolone acetonide (KENALOG-40) injection 40 mg    Plantar fasciitis, bilateral      Patient presents with bilateral foot pain which is consistent with plantar fasciitis.  We did review the diagnoses and treatment options at length.  I have recommended that we consider a steroid injection given his level discomfort.  We did review the risks and benefits.  Steroid injection was performed under ultrasound guidance without complication to both feet.  I have recommended that he acquire a pair of over-the-counter arch supports.  We did discuss proper use and effects.  I would like him to start stretching and  manual therapy exercises daily.  We did discuss avoidance of high-impact exercise.  I would like him to also avoid barefoot and unsupported weightbearing.  I would like to see him in 4 weeks for reevaluation.  He is to call with any additional issues or concerns    Plantar Fascia Steroid Injection: Right    Informed consent was obtained before proceeding with the procedure. The area of maximal tenderness was palpated near the plantar medial calcaneal tuberosity of right foot.  The area was cleansed with alcohol.  Under ultrasound guidance, the plantar fascia was visualized and a solution totaling 1.5 mL was injected about the origin of the plantar fascia.  The solution contained 0.5 mL of 1% lidocaine plain, 0.5 mL of 0.5% Marcaine plain, and 0.5 mL of Kenalog.  After the injection, the patient noted immediate pain relief.  Mild compression was placed at the injection site followed by a sterile bandage.  The patient tolerated the injection well without complication.    Plantar Fascia Steroid Injection: Left    Informed consent was obtained before proceeding with the procedure. The area of maximal tenderness was palpated near the plantar medial calcaneal tuberosity of left foot.  The area was cleansed with alcohol.  Under ultrasound guidance, the plantar fascia was visualized and a solution totaling 1.5 mL was injected about the origin of the plantar fascia.  The solution contained 0.5 mL of 1% lidocaine plain, 0.5 mL of 0.5% Marcaine plain, and 0.5 mL of Kenalog.  After the injection, the patient noted immediate pain relief.  Mild compression was placed at the injection site followed by a sterile bandage.  The patient tolerated the injection well without complication.      Orders Placed This Encounter   Procedures   • XR Foot 3+ View Bilateral     Weight Bearing     Order Specific Question:   Reason for Exam:     Answer:   Bilateral foot pain in the heel and arch states its a stabbing feeling in the arch RM 15 wb         Note is dictated utilizing voice recognition software. Unfortunately this leads to occasional typographical errors. I apologize in advance if the situation occurs. If questions occur please do not hesitate to call our office.

## 2020-09-16 NOTE — PATIENT INSTRUCTIONS
Plantar Fasciitis    Plantar fasciitis is a painful foot condition that affects the heel. It occurs when the band of tissue that connects the toes to the heel bone (plantar fascia) becomes irritated. This can happen as the result of exercising too much or doing other repetitive activities (overuse injury).  The pain from plantar fasciitis can range from mild irritation to severe pain that makes it difficult to walk or move. The pain is usually worse in the morning after sleeping, or after sitting or lying down for a while. Pain may also be worse after long periods of walking or standing.  What are the causes?  This condition may be caused by:  · Standing for long periods of time.  · Wearing shoes that do not have good arch support.  · Doing activities that put stress on joints (high-impact activities), including running, aerobics, and ballet.  · Being overweight.  · An abnormal way of walking (gait).  · Tight muscles in the back of your lower leg (calf).  · High arches in your feet.  · Starting a new athletic activity.  What are the signs or symptoms?  The main symptom of this condition is heel pain. Pain may:  · Be worse with first steps after a time of rest, especially in the morning after sleeping or after you have been sitting or lying down for a while.  · Be worse after long periods of standing still.  · Decrease after 30-45 minutes of activity, such as gentle walking.  How is this diagnosed?  This condition may be diagnosed based on your medical history and your symptoms. Your health care provider may ask questions about your activity level. Your health care provider will do a physical exam to check for:  · A tender area on the bottom of your foot.  · A high arch in your foot.  · Pain when you move your foot.  · Difficulty moving your foot.  You may have imaging tests to confirm the diagnosis, such as:  · X-rays.  · Ultrasound.  · MRI.  How is this treated?  Treatment for plantar fasciitis depends on how  severe your condition is. Treatment may include:  · Rest, ice, applying pressure (compression), and raising the affected foot (elevation). This may be called RICE therapy. Your health care provider may recommend RICE therapy along with over-the-counter pain medicines to manage your pain.  · Exercises to stretch your calves and your plantar fascia.  · A splint that holds your foot in a stretched, upward position while you sleep (night splint).  · Physical therapy to relieve symptoms and prevent problems in the future.  · Injections of steroid medicine (cortisone) to relieve pain and inflammation.  · Stimulating your plantar fascia with electrical impulses (extracorporeal shock wave therapy). This is usually the last treatment option before surgery.  · Surgery, if other treatments have not worked after 12 months.  Follow these instructions at home:    Managing pain, stiffness, and swelling  · If directed, put ice on the painful area:  ? Put ice in a plastic bag, or use a frozen bottle of water.  ? Place a towel between your skin and the bag or bottle.  ? Roll the bottom of your foot over the bag or bottle.  ? Do this for 20 minutes, 2-3 times a day.  · Wear athletic shoes that have air-sole or gel-sole cushions, or try wearing soft shoe inserts that are designed for plantar fasciitis.  · Raise (elevate) your foot above the level of your heart while you are sitting or lying down.  Activity  · Avoid activities that cause pain. Ask your health care provider what activities are safe for you.  · Do physical therapy exercises and stretches as told by your health care provider.  · Try activities and forms of exercise that are easier on your joints (low-impact). Examples include swimming, water aerobics, and biking.  General instructions  · Take over-the-counter and prescription medicines only as told by your health care provider.  · Wear a night splint while sleeping, if told by your health care provider. Loosen the splint  if your toes tingle, become numb, or turn cold and blue.  · Maintain a healthy weight, or work with your health care provider to lose weight as needed.  · Keep all follow-up visits as told by your health care provider. This is important.  Contact a health care provider if you:  · Have symptoms that do not go away after caring for yourself at home.  · Have pain that gets worse.  · Have pain that affects your ability to move or do your daily activities.  Summary  · Plantar fasciitis is a painful foot condition that affects the heel. It occurs when the band of tissue that connects the toes to the heel bone (plantar fascia) becomes irritated.  · The main symptom of this condition is heel pain that may be worse after exercising too much or standing still for a long time.  · Treatment varies, but it usually starts with rest, ice, compression, and elevation (RICE therapy) and over-the-counter medicines to manage pain.  This information is not intended to replace advice given to you by your health care provider. Make sure you discuss any questions you have with your health care provider.  Document Released: 09/12/2002 Document Revised: 11/30/2018 Document Reviewed: 10/15/2018  [employer] Patient Education © 2020 [employer]

## 2020-10-13 ENCOUNTER — OFFICE VISIT (OUTPATIENT)
Dept: PODIATRY | Facility: CLINIC | Age: 55
End: 2020-10-13

## 2020-10-13 VITALS
WEIGHT: 126 LBS | HEART RATE: 86 BPM | BODY MASS INDEX: 18.66 KG/M2 | HEIGHT: 69 IN | DIASTOLIC BLOOD PRESSURE: 75 MMHG | SYSTOLIC BLOOD PRESSURE: 111 MMHG

## 2020-10-13 DIAGNOSIS — M72.2 PLANTAR FASCIITIS, BILATERAL: Primary | ICD-10-CM

## 2020-10-13 PROCEDURE — 99213 OFFICE O/P EST LOW 20 MIN: CPT | Performed by: PODIATRIST

## 2020-10-14 NOTE — PROGRESS NOTES
"10/13/2020  Foot and Ankle Surgery - Established Patient/Follow-up  Provider: Dr. Romeo Funes DPM  Location: [facility]    Subjective:  Maninder Edwards is a 54 y.o. male.     Chief Complaint   Patient presents with   • Left Foot - Follow-up   • Right Foot - Follow-up       HPI: Patient returns for follow-up on plantar fasciitis involving both heels.  He did obtain steroid injections at previous exam with no noticed improvement.  He does not feel that he obtained any resolution to his issues.  He has been performing stretching exercises and wearing inserts.  He continues to have prominent tightness with first few steps in the morning and after increased activity.  He states that he is tired of dealing with this issue and would like to discuss definitive treatment options.    No Known Allergies    Current Outpatient Medications on File Prior to Visit   Medication Sig Dispense Refill   • amitriptyline (ELAVIL) 10 MG tablet One at hs Increase as tolerated to three at hs 90 tablet 5   • gabapentin (NEURONTIN) 300 MG capsule TAKE 1-3 CAPSULES BY MOUTH AT BEDTIME 90 capsule 1   • meloxicam (Mobic) 15 MG tablet Take 1 tablet by mouth Daily. 30 tablet 12   • SUMAtriptan (IMITREX) 100 MG tablet Take one tablet at onset of headache. May repeat dose one time in 2 hours if headache not relieved. 9 tablet 11     No current facility-administered medications on file prior to visit.        Objective   /75   Pulse 86   Ht 175.3 cm (69\")   Wt 57.2 kg (126 lb)   BMI 18.61 kg/m²      General:   Appearance: appears stated age and healthy    Orientation: AAOx3    Affect: appropriate    Gait: antalgic       VASCULAR       Right Foot Vascularity   Normal vascular exam    Dorsalis pedis:  2+  Posterior tibial:  2+  Skin Temperature: warm    Edema Grading:  None  CFT:  < 3 seconds  Pedal Hair Growth:  Present  Varicosities: none        Left Foot Vascularity   Normal vascular exam    Dorsalis pedis:  2+  Posterior " tibial:  2+  Skin Temperature: warm    Edema Grading:  None  CFT:  < 3 seconds  Pedal Hair Growth:  Present  Varicosities: none        NEUROLOGIC      Right Foot Neurologic   Light touch sensation:  Normal  Hot/Cold sensation: normal    Achilles reflex:  2+      Left Foot Neurologic   Light touch sensation:  Normal  Hot/cold sensation: normal    Achilles reflex:  2+      MUSCULOSKELETAL       Right Foot Musculoskeletal   Ecchymosis:  None  Tenderness: plantar fascia and plantar heel    Arch:  Normal      Left Foot Musculoskeletal   Ecchymosis:  None  Tenderness: plantar fascia and plantar heel        MUSCLE STRENGTH      Right Foot Muscle Strength   Normal strength    Foot dorsiflexion:  5  Foot plantar flexion:  5  Foot inversion:  5  Foot eversion:  5      Left Foot Muscle Strength   Normal strength    Foot dorsiflexion:  5  Foot plantar flexion:  5  Foot inversion:  5  Foot eversion:  5      RANGE OF MOTION       Right Foot Range of Motion   Foot and ankle ROM within normal limits        Left Foot Range of Motion   Foot and ankle ROM within normal limits        DERMATOLOGIC      Right Foot Dermatologic   Skin: skin intact        Left Foot Dermatologic   Skin: skin intact        Right Foot Additional Comments No gross deformity or instability.  Moderate discomfort with palpation to the plantar medial calcaneal tuberosity and moderate equinus contracture, bilateral    Assessment/Plan   Diagnoses and all orders for this visit:    1. Plantar fasciitis, bilateral (Primary)  -     CBC (No Diff); Future  -     Basic Metabolic Panel; Future  -     ECG 12 Lead; Future  -     XR Chest 2 View; Future  -     ceFAZolin (ANCEF) 2 g in sodium chloride 0.9 % 100 mL IVPB  -     Case Request; Standing    Other orders  -     Follow Anesthesia Guidelines / Standing Orders; Future  -     Obtain Informed Consent; Future  -     Provide NPO Instructions to Patient; Future  -     Chlorhexidine Skin Prep; Future  -     Follow Anesthesia  Guidelines / Standing Orders; Standing  -     Clip Operative Site; Standing  -     Verify / Perform Chlorhexidine Skin Prep; Standing  -     Verify / Perform Chlorhexidine Skin Prep if Indicated (If Not Already Completed); Standing      Physical exam is unchanged.  He continues to have prominent discomfort involving both heels.  Given that he has not responded to conservative care and he has had these issues for several years, I do feel that is appropriate to give consideration for endoscopic plantar fasciotomy.  We did review the procedure, risk, goals, and recovery.  He does understand that he would be required to start with 1 foot and will require off weightbearing after surgery.  Patient understands and states that he would like to proceed with the left foot.  He is to call with any additional issues or concerns.  He would like to plan for the near future.    Orders Placed This Encounter   Procedures   • XR Chest 2 View     Standing Status:   Future     Standing Expiration Date:   10/14/2021     Order Specific Question:   Reason for Exam:     Answer:   Preop   • CBC (No Diff)     Standing Status:   Future     Standing Expiration Date:   10/14/2021   • Basic Metabolic Panel     Standing Status:   Future     Standing Expiration Date:   10/14/2021   • Follow Anesthesia Guidelines / Standing Orders     Standing Status:   Future   • Obtain Informed Consent     Standing Status:   Future     Order Specific Question:   Informed Consent Given For     Answer:   Endoscopic plantar fasciotomy to the left lower extremity   • Provide NPO Instructions to Patient     Standing Status:   Future   • Chlorhexidine Skin Prep     Chlorhexidine Skin Prep and Instructions For All Patients Having A Procedure Requiring an Outward Incision if Not Allergic. If Allergic, Give Antibacterial Skin Wipes and Instructions. Do Not Use For Facial Cases or on Any Mucus Membranes.     Standing Status:   Future   • ECG 12 Lead     Standing Status:    Future     Standing Expiration Date:   10/14/2021     Order Specific Question:   Reason for Exam:     Answer:   Preop          Note is dictated utilizing voice recognition software. Unfortunately this leads to occasional typographical errors. I apologize in advance if the situation occurs. If questions occur please do not hesitate to call our office.

## 2020-10-22 ENCOUNTER — OFFICE VISIT (OUTPATIENT)
Dept: NEUROLOGY | Facility: CLINIC | Age: 55
End: 2020-10-22

## 2020-10-22 VITALS
WEIGHT: 128 LBS | HEIGHT: 69 IN | HEART RATE: 73 BPM | BODY MASS INDEX: 18.96 KG/M2 | DIASTOLIC BLOOD PRESSURE: 71 MMHG | SYSTOLIC BLOOD PRESSURE: 115 MMHG | TEMPERATURE: 98.9 F

## 2020-10-22 DIAGNOSIS — G43.019 INTRACTABLE MIGRAINE WITHOUT AURA AND WITHOUT STATUS MIGRAINOSUS: Primary | ICD-10-CM

## 2020-10-22 PROCEDURE — 99213 OFFICE O/P EST LOW 20 MIN: CPT | Performed by: PSYCHIATRY & NEUROLOGY

## 2020-10-22 RX ORDER — TOPIRAMATE 50 MG/1
50 TABLET, FILM COATED ORAL 2 TIMES DAILY
Qty: 60 TABLET | Refills: 2 | Status: SHIPPED | OUTPATIENT
Start: 2020-10-22 | End: 2021-04-26 | Stop reason: SDUPTHER

## 2020-10-22 NOTE — PROGRESS NOTES
Subjective: Intractable migraine without aura and without status migrainosus    Patient ID: Maninder Edwards is a 54 y.o. male.    History of Present Illness, 6 month f/u     Migraine/headaches, vascular headaches f/u with MRI brain and lab results.     Patient is being treated with amitriptyline and for the ha prn Imitrex    . Last headache started on Monday and carried through Tuesday     feels the medication is not helping he slept quit a bit but the frequency and severity was the same as before  Having migraine 2-3 times per month    . Patient is a light sleeper tosses and turns quit a bit averages 6 hours of sleep per night. Patient been having more issues with fatigue takes care of his parents there in the 80's and feels he gets more worn down, and usually takes a nap in the afternoon.     The following portions of the patient's history were reviewed and updated as appropriate: allergies, current medications, past family history, past medical history, past social history, past surgical history and problem list.    Family History   Problem Relation Age of Onset   • No Known Problems Mother    • Heart disease Father        Past Medical History:   Diagnosis Date   • Degenerative joint disease of shoulder    • GERD (gastroesophageal reflux disease)    • Hypermetropia    • Impotence of organic origin    • Migraine    • Mixed hyperlipidemia    • Presbyopia        Social History     Socioeconomic History   • Marital status:      Spouse name: Not on file   • Number of children: Not on file   • Years of education: Not on file   • Highest education level: Not on file   Tobacco Use   • Smoking status: Former Smoker   • Smokeless tobacco: Never Used   Substance and Sexual Activity   • Alcohol use: No     Frequency: Never   • Drug use: Never   • Sexual activity: Yes     Partners: Female         Current Outpatient Medications:   •  amitriptyline (ELAVIL) 10 MG tablet, One at hs Increase as tolerated to three at hs,  Disp: 90 tablet, Rfl: 5  •  gabapentin (NEURONTIN) 300 MG capsule, TAKE 1-3 CAPSULES BY MOUTH AT BEDTIME, Disp: 90 capsule, Rfl: 1  •  meloxicam (Mobic) 15 MG tablet, Take 1 tablet by mouth Daily., Disp: 30 tablet, Rfl: 12  •  SUMAtriptan (IMITREX) 100 MG tablet, Take one tablet at onset of headache. May repeat dose one time in 2 hours if headache not relieved., Disp: 9 tablet, Rfl: 11  •  topiramate (Topamax) 50 MG tablet, Take 1 tablet by mouth 2 (Two) Times a Day., Disp: 60 tablet, Rfl: 2    Review of Systems   Constitutional: Positive for fatigue. Negative for appetite change.   HENT: Positive for hearing loss and tinnitus.    Eyes: Negative for pain and itching.   Respiratory: Negative for cough and shortness of breath.    Cardiovascular: Negative for chest pain and palpitations.   Gastrointestinal: Negative for constipation and diarrhea.   Endocrine: Negative for cold intolerance and heat intolerance.   Genitourinary: Negative for difficulty urinating and frequency.   Musculoskeletal: Negative for back pain and neck pain.   Allergic/Immunologic: Negative for environmental allergies.   Neurological: Positive for light-headedness and headaches. Negative for dizziness, tremors, seizures, syncope, facial asymmetry, speech difficulty, weakness and numbness.   Psychiatric/Behavioral: Positive for agitation. Negative for confusion.          I have reviewed ROS completed by medical assistant.     Objective:    Neurologic Exam     Mental Status   Oriented to person, place, and time.   Attention: normal.   Level of consciousness: alert    Cranial Nerves     CN V   Facial sensation intact.     CN VII   Facial expression full, symmetric.     CN VIII   CN VIII normal.       Physical Exam  Vitals signs reviewed.   Constitutional:       Appearance: Normal appearance.   Neurological:      Mental Status: He is alert and oriented to person, place, and time.   Psychiatric:         Mood and Affect: Mood normal.         Behavior:  Behavior normal.         Assessment/Plan:    Diagnoses and all orders for this visit:    1. Intractable migraine without aura and without status migrainosus (Primary)    Other orders  -     topiramate (Topamax) 50 MG tablet; Take 1 tablet by mouth 2 (Two) Times a Day.  Dispense: 60 tablet; Refill: 2      Continue amitriptyline 10mg at hs  Start topamax 50mg bid    For ha take imitrex with an otc aleve prn      EPWORTH SLEEPINESS SCALE  Sitting and reading  3  WatchingTV  3  Sitting, inactive, in a public place  1  As a passenger in a car for 1 hour w/o a break  3  Lying down to rest in the afternoon  3  Sitting and talking to someone  2  Sitting quietly after a lunch  3  In a car, while stopped for traffic or a light  0  Total 18          This document has been electronically signed by Joseph Seipel, MD on October 22, 2020 11:53 EDT

## 2020-10-28 DIAGNOSIS — G44.89 OTHER HEADACHE SYNDROME: ICD-10-CM

## 2020-10-28 RX ORDER — AMITRIPTYLINE HYDROCHLORIDE 10 MG/1
TABLET, FILM COATED ORAL
Qty: 90 TABLET | Refills: 5 | Status: SHIPPED | OUTPATIENT
Start: 2020-10-28 | End: 2022-04-26 | Stop reason: SDUPTHER

## 2020-10-30 RX ORDER — GABAPENTIN 300 MG/1
CAPSULE ORAL
Qty: 90 CAPSULE | Refills: 1 | Status: SHIPPED | OUTPATIENT
Start: 2020-10-30

## 2021-04-20 NOTE — PROGRESS NOTES
Chief Complaint  Headache (1 year fup migraine headaches )1 year fup migraine headaches     Subjective          Maninder Edwards presents to [facility] for headaches  History of Present Illness    Patient is here for follow up on headaches  worse than last visit pt states headaches have increased frontal  headaches with some nausea     Last headache Friday 04/24/2021that lasted until Saturday pt states bright light triggers the headaches either on the right side or left that last 1-2 days   Lasted 2 days pt feels worn out fatigued   Medication Topamax Imitrex Elavil  feels like it helped some but pt states feels wiped out   Frequency once per week    =====================================  Previous OV:  Migraine/headaches, vascular headaches f/u with MRI brain and lab results.    Patient is being treated with amitriptyline and for the ha prn Imitrex   . Last headache started on Monday and carried through Tuesday    feels the medication is not helping he slept quit a bit but the frequency and severity was the same as before  Having migraine 2-3 times per month   . Patient is a light sleeper tosses and turns quit a bit averages 6 hours of sleep per night. Patient been having more issues with fatigue takes care of his parents there in the 80's and feels he gets more worn down, and usually takes a nap in the afternoon.          Current Outpatient Medications:   •  amitriptyline (ELAVIL) 10 MG tablet, TAKE 1 TABLET BY MOUTH AT BEDTIME INCREASE AS TOLERATED TO 3 TABLETS AT BEDTIME, Disp: 90 tablet, Rfl: 5  •  gabapentin (NEURONTIN) 300 MG capsule, TAKE 1 TO 3 CAPSULES BY MOUTH AT BEDTIME, Disp: 90 capsule, Rfl: 1  •  meloxicam (Mobic) 15 MG tablet, Take 1 tablet by mouth Daily., Disp: 30 tablet, Rfl: 12  •  SUMAtriptan (IMITREX) 100 MG tablet, Take one tablet at onset of headache. May repeat dose one time in 2 hours if headache not relieved., Disp: 9 tablet, Rfl: 11  •  topiramate (Topamax) 50 MG  "tablet, One in am and two at hs, Disp: 90 tablet, Rfl: 11    Review of Systems   Constitutional: Positive for fatigue. Negative for chills and fever.   Eyes: Positive for pain. Negative for discharge and itching.   Respiratory: Negative for apnea, chest tightness and shortness of breath.    Cardiovascular: Negative for chest pain.   Gastrointestinal: Negative for abdominal distention, abdominal pain, constipation and diarrhea.   Endocrine: Positive for cold intolerance. Negative for heat intolerance.   Genitourinary: Negative for urgency.   Musculoskeletal: Positive for arthralgias and back pain. Negative for myalgias, neck pain and neck stiffness.   Neurological: Positive for dizziness and light-headedness.   Psychiatric/Behavioral: Positive for sleep disturbance (PTSD ). Negative for agitation, behavioral problems and confusion. The patient is nervous/anxious.           Objective:    Vital Signs:   /75 (BP Location: Left arm, Patient Position: Sitting)   Pulse 69   Temp 98 °F (36.7 °C) (Temporal)   Resp 20   Ht 175.3 cm (69\")   Wt 59.5 kg (131 lb 3.2 oz)   SpO2 98%   BMI 19.37 kg/m²     Physical Exam  Vitals reviewed.   Constitutional:       Appearance: Normal appearance.   Neurological:      General: No focal deficit present.      Mental Status: He is alert and oriented to person, place, and time.   Psychiatric:         Mood and Affect: Mood normal.         Behavior: Behavior normal.        Result Review :                Neurologic Exam     Mental Status   Oriented to person, place, and time.   Attention: normal.   Level of consciousness: alert        Assessment and Plan    Diagnoses and all orders for this visit:    1. Intractable migraine without aura and without status migrainosus (Primary)    Other orders  -     topiramate (Topamax) 50 MG tablet; One in am and two at hs  Dispense: 90 tablet; Refill: 11  -     SUMAtriptan (IMITREX) 100 MG tablet; Take one tablet at onset of headache. May repeat " dose one time in 2 hours if headache not relieved.  Dispense: 9 tablet; Refill: 11         Follow Up   Return in about 1 year (around 4/26/2022).  Patient was given instructions and counseling regarding his condition or for health maintenance advice. Please see specific information pulled into the AVS if appropriate.     Continue amitriptyline 10mg tid and increase topamax to 150mg per day  Continue imitrex prn        This document has been electronically signed by Joseph Seipel, MD on April 26, 2021 11:32 EDT

## 2021-04-26 ENCOUNTER — OFFICE VISIT (OUTPATIENT)
Dept: NEUROLOGY | Facility: CLINIC | Age: 56
End: 2021-04-26

## 2021-04-26 VITALS
DIASTOLIC BLOOD PRESSURE: 75 MMHG | WEIGHT: 131.2 LBS | TEMPERATURE: 98 F | RESPIRATION RATE: 20 BRPM | HEART RATE: 69 BPM | HEIGHT: 69 IN | BODY MASS INDEX: 19.43 KG/M2 | SYSTOLIC BLOOD PRESSURE: 111 MMHG | OXYGEN SATURATION: 98 %

## 2021-04-26 DIAGNOSIS — G43.019 INTRACTABLE MIGRAINE WITHOUT AURA AND WITHOUT STATUS MIGRAINOSUS: Primary | ICD-10-CM

## 2021-04-26 PROCEDURE — 99214 OFFICE O/P EST MOD 30 MIN: CPT | Performed by: PSYCHIATRY & NEUROLOGY

## 2021-04-26 RX ORDER — SUMATRIPTAN 100 MG/1
TABLET, FILM COATED ORAL
Qty: 9 TABLET | Refills: 11 | Status: SHIPPED | OUTPATIENT
Start: 2021-04-26 | End: 2022-04-26 | Stop reason: ALTCHOICE

## 2021-04-26 RX ORDER — TOPIRAMATE 50 MG/1
TABLET, FILM COATED ORAL
Qty: 90 TABLET | Refills: 11 | Status: SHIPPED | OUTPATIENT
Start: 2021-04-26 | End: 2022-04-26 | Stop reason: SDUPTHER

## 2021-08-13 ENCOUNTER — OFFICE VISIT (OUTPATIENT)
Dept: FAMILY MEDICINE CLINIC | Facility: CLINIC | Age: 56
End: 2021-08-13

## 2021-08-13 VITALS
WEIGHT: 127.8 LBS | HEIGHT: 69 IN | DIASTOLIC BLOOD PRESSURE: 56 MMHG | BODY MASS INDEX: 18.93 KG/M2 | TEMPERATURE: 97.1 F | HEART RATE: 76 BPM | SYSTOLIC BLOOD PRESSURE: 78 MMHG | OXYGEN SATURATION: 99 % | RESPIRATION RATE: 18 BRPM

## 2021-08-13 DIAGNOSIS — M25.562 PAIN IN BOTH KNEES, UNSPECIFIED CHRONICITY: ICD-10-CM

## 2021-08-13 DIAGNOSIS — M25.561 PAIN IN BOTH KNEES, UNSPECIFIED CHRONICITY: ICD-10-CM

## 2021-08-13 DIAGNOSIS — Z11.59 NEED FOR HEPATITIS C SCREENING TEST: ICD-10-CM

## 2021-08-13 DIAGNOSIS — Z12.11 COLON CANCER SCREENING: ICD-10-CM

## 2021-08-13 DIAGNOSIS — Z12.5 PROSTATE CANCER SCREENING: ICD-10-CM

## 2021-08-13 DIAGNOSIS — Z00.00 ANNUAL PHYSICAL EXAM: Primary | ICD-10-CM

## 2021-08-13 PROCEDURE — 99213 OFFICE O/P EST LOW 20 MIN: CPT | Performed by: FAMILY MEDICINE

## 2021-08-13 PROCEDURE — 99396 PREV VISIT EST AGE 40-64: CPT | Performed by: FAMILY MEDICINE

## 2021-08-13 RX ORDER — METHYLPREDNISOLONE 4 MG/1
TABLET ORAL
Qty: 21 TABLET | Refills: 0 | Status: SHIPPED | OUTPATIENT
Start: 2021-08-13 | End: 2021-09-21

## 2021-08-13 NOTE — PROGRESS NOTES
Subjective   Maninder Edwards is a 55 y.o. male.   Chief Complaint   Patient presents with   • Knee Pain       The patient is here: for coordination of medical care and annual wellness examination.  Patient's last comprehensive physical was on 7/2020.  Previous physical was performed by  Deep Yo MD.  Patient has: moderate activity with work/home activities      Knee Pain   The incident occurred more than 1 week ago. The injury mechanism is unknown. The pain is present in the left knee and right knee. The pain is at a severity of 6/10. Associated symptoms include tingling. Pertinent negatives include no numbness. He reports no foreign bodies present. The symptoms are aggravated by movement and weight bearing. Treatments tried: mobic. The treatment provided mild relief.   Leg Pain   Incident onset: multiple years, pain has recently become worse and  more constant.  There was no injury mechanism. The pain is present in the left knee, right foot, right ankle, left foot, right heel and left heel. The quality of the pain is described as aching. The pain has been constant since onset. Associated symptoms include tingling. Pertinent negatives include no numbness. Associated symptoms comments: Feels like arches are falling. Swelling. . The symptoms are aggravated by weight bearing.        The following portions of the patient's history were reviewed and updated as appropriate: allergies, current medications, past family history, past medical history, past social history, past surgical history and problem list.    Patient Active Problem List   Diagnosis   • Degenerative joint disease of shoulder, left   • Mixed hyperlipidemia   • GERD (gastroesophageal reflux disease)   • Impotence of organic origin   • Other headache syndrome   • Drusen (degenerative) of macula, right eye   • Hypermetropia   • Presbyopia of both eyes   • Hypermetropia of both eyes   • Migraine       Current Outpatient Medications on File Prior  to Visit   Medication Sig Dispense Refill   • amitriptyline (ELAVIL) 10 MG tablet TAKE 1 TABLET BY MOUTH AT BEDTIME INCREASE AS TOLERATED TO 3 TABLETS AT BEDTIME 90 tablet 5   • gabapentin (NEURONTIN) 300 MG capsule TAKE 1 TO 3 CAPSULES BY MOUTH AT BEDTIME 90 capsule 1   • meloxicam (Mobic) 15 MG tablet Take 1 tablet by mouth Daily. 30 tablet 12   • SUMAtriptan (IMITREX) 100 MG tablet Take one tablet at onset of headache. May repeat dose one time in 2 hours if headache not relieved. 9 tablet 11   • topiramate (Topamax) 50 MG tablet One in am and two at hs 90 tablet 11     No current facility-administered medications on file prior to visit.     Current outpatient and discharge medications have been reconciled for the patient.  Reviewed by: Deep Yo MD      No Known Allergies    Review of Systems   Constitutional: Negative for activity change, appetite change, fatigue and fever.   HENT: Negative for ear pain, swollen glands and voice change.    Eyes: Negative for visual disturbance.   Respiratory: Negative for shortness of breath and wheezing.    Cardiovascular: Negative for chest pain and leg swelling.   Gastrointestinal: Negative for abdominal pain, blood in stool, constipation, diarrhea, nausea and vomiting.   Endocrine: Negative for polydipsia and polyuria.   Genitourinary: Negative for dysuria, frequency and hematuria.   Musculoskeletal: Positive for arthralgias. Negative for joint swelling, neck pain and neck stiffness.   Skin: Negative for rash and bruise.   Neurological: Positive for tingling. Negative for weakness, numbness and headache.   Psychiatric/Behavioral: Negative for suicidal ideas and depressed mood.     I have reviewed and confirmed the accuracy of the ROS as documented by the MA/LPN/RN Deep Yo MD    Objective   Visit Vitals  BP (!) 78/56 (BP Location: Right arm, Patient Position: Sitting, Cuff Size: Adult)   Pulse 76   Temp 97.1 °F (36.2 °C)   Resp 18   Ht 175.3 cm  "(69\")   Wt 58 kg (127 lb 12.8 oz)   SpO2 99%   BMI 18.87 kg/m²       Physical Exam  Constitutional:       Appearance: He is well-developed.   HENT:      Head: Normocephalic and atraumatic.      Right Ear: External ear normal.      Left Ear: External ear normal.      Nose: Nose normal.   Eyes:      Pupils: Pupils are equal, round, and reactive to light.   Cardiovascular:      Rate and Rhythm: Normal rate and regular rhythm.      Heart sounds: Normal heart sounds.   Pulmonary:      Effort: Pulmonary effort is normal.      Breath sounds: Normal breath sounds.   Abdominal:      General: Bowel sounds are normal.      Palpations: Abdomen is soft.   Musculoskeletal:         General: Tenderness (lat joint line tenderness without effusion) present.      Cervical back: Normal range of motion and neck supple.   Skin:     General: Skin is warm and dry.   Neurological:      Mental Status: He is alert and oriented to person, place, and time.   Psychiatric:         Behavior: Behavior normal.         Thought Content: Thought content normal.         Judgment: Judgment normal.       Assessment/Plan .  Patient's Body mass index is 18.87 kg/m². indicating that he is within normal range (BMI 18.5-24.9). No BMI management plan needed..     Maninder Edwards  reports that he quit smoking about 12 years ago. His smoking use included cigarettes. He has a 5.00 pack-year smoking history. He has never used smokeless tobacco..   Diagnoses and all orders for this visit:    1. Annual physical exam (Primary)  -     Hepatitis C Antibody  -     CBC Auto Differential  -     Comprehensive Metabolic Panel  -     Lipid Panel With / Chol / HDL Ratio  -     Rheumatoid Factor  -     Sedimentation Rate  -     TSH  -     C-reactive Protein  -     Cologuard - Stool, Per Rectum; Future    2. Need for hepatitis C screening test  -     Hepatitis C Antibody    3. Colon cancer screening  -     Cologuard - Stool, Per Rectum; Future    4. Pain in both knees, " unspecified chronicity  -     Rheumatoid Factor  -     TSH  -     C-reactive Protein  -     methylPREDNISolone (MEDROL) 4 MG dose pack; 6 tablets on day one, 5 tablets on day two, 4 tablets on day three, 3 tablets on day four, 2 tablets on day five, 1 tablet on day 6.  Dispense: 21 tablet; Refill: 0    5. Prostate cancer screening  -     PSA Screen     Discussed anticipatory guidance, diet, exercise, and weight loss.  Discussed safety and routine screening examinations.  Discussed self-examinations.  Trial of steroids. Previous inflammatory eval neg, xrays nl  Consider ortho eval if sx persist  Recheck creatinine  Follow-up after testing complete, sooner for worsening symptoms or any concerns          I wore protective equipment throughout this patient encounter to include mask and gloves. Hand hygiene was performed before donning protective equipment and after removal when leaving the room

## 2021-08-14 LAB
ALBUMIN SERPL-MCNC: 3.9 G/DL (ref 3.8–4.9)
ALBUMIN/GLOB SERPL: 1.3 {RATIO} (ref 1.2–2.2)
ALP SERPL-CCNC: 120 IU/L (ref 48–121)
ALT SERPL-CCNC: 27 IU/L (ref 0–44)
AST SERPL-CCNC: 22 IU/L (ref 0–40)
BASOPHILS # BLD AUTO: 0.1 X10E3/UL (ref 0–0.2)
BASOPHILS NFR BLD AUTO: 1 %
BILIRUB SERPL-MCNC: 0.4 MG/DL (ref 0–1.2)
BUN SERPL-MCNC: 15 MG/DL (ref 6–24)
BUN/CREAT SERPL: 14 (ref 9–20)
CALCIUM SERPL-MCNC: 9.5 MG/DL (ref 8.7–10.2)
CHLORIDE SERPL-SCNC: 103 MMOL/L (ref 96–106)
CHOLEST SERPL-MCNC: 229 MG/DL (ref 100–199)
CHOLEST/HDLC SERPL: 3.8 RATIO (ref 0–5)
CO2 SERPL-SCNC: 28 MMOL/L (ref 20–29)
CREAT SERPL-MCNC: 1.07 MG/DL (ref 0.76–1.27)
CRP SERPL-MCNC: <1 MG/L (ref 0–10)
EOSINOPHIL # BLD AUTO: 0.2 X10E3/UL (ref 0–0.4)
EOSINOPHIL NFR BLD AUTO: 3 %
ERYTHROCYTE [DISTWIDTH] IN BLOOD BY AUTOMATED COUNT: 12.4 % (ref 11.6–15.4)
ERYTHROCYTE [SEDIMENTATION RATE] IN BLOOD BY WESTERGREN METHOD: 2 MM/HR (ref 0–30)
GLOBULIN SER CALC-MCNC: 3.1 G/DL (ref 1.5–4.5)
GLUCOSE SERPL-MCNC: 89 MG/DL (ref 65–99)
HCT VFR BLD AUTO: 43.9 % (ref 37.5–51)
HCV AB S/CO SERPL IA: <0.1 S/CO RATIO (ref 0–0.9)
HDLC SERPL-MCNC: 61 MG/DL
HGB BLD-MCNC: 14.8 G/DL (ref 13–17.7)
IMM GRANULOCYTES # BLD AUTO: 0 X10E3/UL (ref 0–0.1)
IMM GRANULOCYTES NFR BLD AUTO: 0 %
LDLC SERPL CALC-MCNC: 153 MG/DL (ref 0–99)
LYMPHOCYTES # BLD AUTO: 1.9 X10E3/UL (ref 0.7–3.1)
LYMPHOCYTES NFR BLD AUTO: 32 %
MCH RBC QN AUTO: 30.4 PG (ref 26.6–33)
MCHC RBC AUTO-ENTMCNC: 33.7 G/DL (ref 31.5–35.7)
MCV RBC AUTO: 90 FL (ref 79–97)
MONOCYTES # BLD AUTO: 0.5 X10E3/UL (ref 0.1–0.9)
MONOCYTES NFR BLD AUTO: 8 %
NEUTROPHILS # BLD AUTO: 3.4 X10E3/UL (ref 1.4–7)
NEUTROPHILS NFR BLD AUTO: 56 %
PLATELET # BLD AUTO: 407 X10E3/UL (ref 150–450)
POTASSIUM SERPL-SCNC: 5.4 MMOL/L (ref 3.5–5.2)
PROT SERPL-MCNC: 7 G/DL (ref 6–8.5)
PSA SERPL-MCNC: 2.3 NG/ML (ref 0–4)
RBC # BLD AUTO: 4.87 X10E6/UL (ref 4.14–5.8)
RHEUMATOID FACT SERPL-ACNC: <10 IU/ML (ref 0–13.9)
SODIUM SERPL-SCNC: 140 MMOL/L (ref 134–144)
TRIGL SERPL-MCNC: 88 MG/DL (ref 0–149)
TSH SERPL DL<=0.005 MIU/L-ACNC: 1.18 UIU/ML (ref 0.45–4.5)
VLDLC SERPL CALC-MCNC: 15 MG/DL (ref 5–40)
WBC # BLD AUTO: 6 X10E3/UL (ref 3.4–10.8)

## 2021-08-16 ENCOUNTER — TELEPHONE (OUTPATIENT)
Dept: FAMILY MEDICINE CLINIC | Facility: CLINIC | Age: 56
End: 2021-08-16

## 2021-08-16 NOTE — TELEPHONE ENCOUNTER
----- Message from Deep Yo MD sent at 8/16/2021  8:28 AM EDT -----  Please notify patient that labs are stable/looked ok.  Chol creeping up, recc low fat diet and increase exercise

## 2021-09-21 ENCOUNTER — HOSPITAL ENCOUNTER (OUTPATIENT)
Dept: GENERAL RADIOLOGY | Facility: HOSPITAL | Age: 56
Discharge: HOME | End: 2021-09-21

## 2021-09-21 ENCOUNTER — OFFICE VISIT (OUTPATIENT)
Dept: FAMILY MEDICINE CLINIC | Facility: CLINIC | Age: 56
End: 2021-09-21

## 2021-09-21 VITALS
WEIGHT: 134.4 LBS | HEART RATE: 98 BPM | OXYGEN SATURATION: 98 % | TEMPERATURE: 97.6 F | DIASTOLIC BLOOD PRESSURE: 52 MMHG | SYSTOLIC BLOOD PRESSURE: 84 MMHG | HEIGHT: 69 IN | BODY MASS INDEX: 19.91 KG/M2 | RESPIRATION RATE: 18 BRPM

## 2021-09-21 DIAGNOSIS — M25.561 CHRONIC PAIN OF BOTH KNEES: Primary | ICD-10-CM

## 2021-09-21 DIAGNOSIS — M25.562 CHRONIC PAIN OF BOTH KNEES: Primary | ICD-10-CM

## 2021-09-21 DIAGNOSIS — G89.29 CHRONIC PAIN OF BOTH KNEES: Primary | ICD-10-CM

## 2021-09-21 PROCEDURE — 73564 X-RAY EXAM KNEE 4 OR MORE: CPT

## 2021-09-21 PROCEDURE — 99213 OFFICE O/P EST LOW 20 MIN: CPT | Performed by: FAMILY MEDICINE

## 2021-09-21 NOTE — PROGRESS NOTES
Subjective   Maninder Edwards is a 55 y.o. male.   Chief Complaint   Patient presents with   • Knee Pain       The patient is here: for coordination of medical care and annual wellness examination.  Patient's last comprehensive physical was on 7/2020.  Previous physical was performed by  Deep Yo MD.  Patient has: moderate activity with work/home activities      Labs for inflammation in August were normal.     Knee Pain   The incident occurred more than 1 week ago. The incident occurred at home. The injury mechanism is unknown. The pain is present in the left knee and right knee. The pain is at a severity of 8/10. The pain is severe. Associated symptoms include tingling. Pertinent negatives include no numbness. He reports no foreign bodies present. The symptoms are aggravated by movement and weight bearing. Treatments tried: mobic. The treatment provided mild relief.   Leg Pain   Incident onset: multiple years, pain has recently become worse and  more constant.  There was no injury mechanism. The pain is present in the left knee, right foot, right ankle, left foot, right heel and left heel. The quality of the pain is described as aching. The pain has been constant since onset. Associated symptoms include tingling. Pertinent negatives include no numbness. Associated symptoms comments: Feels like arches are falling. Swelling. . The symptoms are aggravated by weight bearing.        The following portions of the patient's history were reviewed and updated as appropriate: allergies, current medications, past family history, past medical history, past social history, past surgical history and problem list.    Patient Active Problem List   Diagnosis   • Degenerative joint disease of shoulder, left   • Mixed hyperlipidemia   • GERD (gastroesophageal reflux disease)   • Impotence of organic origin   • Other headache syndrome   • Drusen (degenerative) of macula, right eye   • Hypermetropia   • Presbyopia of both  "eyes   • Hypermetropia of both eyes   • Migraine       Current Outpatient Medications on File Prior to Visit   Medication Sig Dispense Refill   • amitriptyline (ELAVIL) 10 MG tablet TAKE 1 TABLET BY MOUTH AT BEDTIME INCREASE AS TOLERATED TO 3 TABLETS AT BEDTIME 90 tablet 5   • gabapentin (NEURONTIN) 300 MG capsule TAKE 1 TO 3 CAPSULES BY MOUTH AT BEDTIME 90 capsule 1   • meloxicam (Mobic) 15 MG tablet Take 1 tablet by mouth Daily. 30 tablet 12   • SUMAtriptan (IMITREX) 100 MG tablet Take one tablet at onset of headache. May repeat dose one time in 2 hours if headache not relieved. 9 tablet 11   • topiramate (Topamax) 50 MG tablet One in am and two at hs 90 tablet 11   • [DISCONTINUED] methylPREDNISolone (MEDROL) 4 MG dose pack 6 tablets on day one, 5 tablets on day two, 4 tablets on day three, 3 tablets on day four, 2 tablets on day five, 1 tablet on day 6. 21 tablet 0     No current facility-administered medications on file prior to visit.     Current outpatient and discharge medications have been reconciled for the patient.  Reviewed by: Deep Yo MD      No Known Allergies    Review of Systems   Neurological: Positive for tingling. Negative for numbness.     I have reviewed and confirmed the accuracy of the ROS as documented by the MA/LPN/RN Deep Yo MD    Objective   Visit Vitals  BP (!) 84/52 (BP Location: Right arm, Patient Position: Sitting, Cuff Size: Adult)   Pulse 98   Temp 97.6 °F (36.4 °C)   Resp 18   Ht 175.3 cm (69\")   Wt 61 kg (134 lb 6.4 oz)   SpO2 98%   BMI 19.85 kg/m²       Physical Exam  Constitutional:       Appearance: He is well-developed.   HENT:      Head: Normocephalic and atraumatic.      Right Ear: External ear normal.      Left Ear: External ear normal.      Nose: Nose normal.   Eyes:      Pupils: Pupils are equal, round, and reactive to light.   Cardiovascular:      Rate and Rhythm: Normal rate and regular rhythm.      Heart sounds: Normal heart sounds. "   Pulmonary:      Effort: Pulmonary effort is normal.      Breath sounds: Normal breath sounds.   Abdominal:      General: Bowel sounds are normal.      Palpations: Abdomen is soft.   Musculoskeletal:         General: Normal range of motion.      Cervical back: Normal range of motion and neck supple.      Comments: Tender about knees bilat without swelling    Skin:     General: Skin is warm and dry.   Neurological:      Mental Status: He is alert and oriented to person, place, and time.   Psychiatric:         Behavior: Behavior normal.         Thought Content: Thought content normal.         Judgment: Judgment normal.         Assessment/Plan .    Diagnoses and all orders for this visit:    1. Chronic pain of both knees (Primary)  -     XR Knee 4+ View Right  -     XR Knee 4+ View Left  -     Ambulatory Referral to Orthopedic Surgery     Findings discussed. All questions answered.  Differential diagnosis discussed.   Recommend xrays, ortho eval as he may benefit from steroid injection.,  Continue mobic for now       I wore protective equipment throughout this patient encounter to include mask and gloves. Hand hygiene was performed before donning protective equipment and after removal when leaving the room

## 2021-09-23 ENCOUNTER — TELEPHONE (OUTPATIENT)
Dept: FAMILY MEDICINE CLINIC | Facility: CLINIC | Age: 56
End: 2021-09-23

## 2021-09-23 NOTE — TELEPHONE ENCOUNTER
----- Message from Deep Yo MD sent at 9/23/2021  7:23 AM EDT -----  Please notify patient that:   Xrays actually looked pretty good - only mild arthritis. We will have to see what the orthopedist says

## 2021-10-15 ENCOUNTER — OFFICE VISIT (OUTPATIENT)
Dept: ORTHOPEDIC SURGERY | Facility: CLINIC | Age: 56
End: 2021-10-15

## 2021-10-15 VITALS
SYSTOLIC BLOOD PRESSURE: 101 MMHG | DIASTOLIC BLOOD PRESSURE: 70 MMHG | HEART RATE: 72 BPM | HEIGHT: 69 IN | BODY MASS INDEX: 19.2 KG/M2 | WEIGHT: 129.6 LBS

## 2021-10-15 DIAGNOSIS — M25.562 ACUTE PAIN OF BOTH KNEES: ICD-10-CM

## 2021-10-15 DIAGNOSIS — M25.561 ACUTE PAIN OF BOTH KNEES: ICD-10-CM

## 2021-10-15 DIAGNOSIS — M17.0 BILATERAL PRIMARY OSTEOARTHRITIS OF KNEE: Primary | ICD-10-CM

## 2021-10-15 PROCEDURE — 99203 OFFICE O/P NEW LOW 30 MIN: CPT | Performed by: ORTHOPAEDIC SURGERY

## 2021-10-15 NOTE — PROGRESS NOTES
General Exam    Patient: Maninder Edwards    YOB: 1965    Medical Record Number: [id number]    Chief Complaints: Bilateral knee pain    History of Present Illness:     55 y.o. 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  Tries to stay fairly active.  Difficulty running and doing intense activities due to discomfort.  Daily activities usually okay.  Does state he has some morning stiffness and some swelling stiffness usually resolves after he gets up and loosens up.  No recent injuries.    Denies any numbness or tingling.  Denies any fevers, cough or shortness of breath.    Allergies: No Known Allergies    Home Medications:      Current Outpatient Medications:   •  amitriptyline (ELAVIL) 10 MG tablet, TAKE 1 TABLET BY MOUTH AT BEDTIME INCREASE AS TOLERATED TO 3 TABLETS AT BEDTIME, Disp: 90 tablet, Rfl: 5  •  gabapentin (NEURONTIN) 300 MG capsule, TAKE 1 TO 3 CAPSULES BY MOUTH AT BEDTIME, Disp: 90 capsule, Rfl: 1  •  meloxicam (Mobic) 15 MG tablet, Take 1 tablet by mouth Daily., Disp: 30 tablet, Rfl: 12  •  SUMAtriptan (IMITREX) 100 MG tablet, Take one tablet at onset of headache. May repeat dose one time in 2 hours if headache not relieved., Disp: 9 tablet, Rfl: 11  •  topiramate (Topamax) 50 MG tablet, One in am and two at hs, Disp: 90 tablet, Rfl: 11    Past Medical History:   Diagnosis Date   • Degenerative joint disease of shoulder    • GERD (gastroesophageal reflux disease)    • Hypermetropia    • Impotence of organic origin    • Migraine    • Mixed hyperlipidemia    • Presbyopia        Past Surgical History:   Procedure Laterality Date   • TOTAL SHOULDER REPLACEMENT Left 05/2019       Social History     Occupational History   • Not on file   Tobacco Use   • Smoking status: Former Smoker     Packs/day: 0.50     Years: 10.00     Pack years: 5.00     Types: Cigarettes     Quit date:  "2009     Years since quittin.3   • Smokeless tobacco: Never Used   Vaping Use   • Vaping Use: Never used   Substance and Sexual Activity   • Alcohol use: No   • Drug use: Never   • Sexual activity: Yes     Partners: Female      Social History     Social History Narrative   • Not on file       Family History   Problem Relation Age of Onset   • No Known Problems Mother    • Heart disease Father        Review of Systems:      Constitutional: Denies fever, shaking or chills         All other pertinent positives and negatives as noted above in HPI.    Physical Exam: 55 y.o. male    Vitals:    10/15/21 1019   BP: 101/70   Pulse: 72   Weight: 58.8 kg (129 lb 9.6 oz)   Height: 175.3 cm (69\")       General:  Patient is awake and alert.  Appears in no acute distress or discomfort.        Musculoskeletal/Extremities:    Bilateral lower extremities: No obvious abnormalities on observation.  No overt swelling.  Knee range of motion is full and painless.  There are some tenderness to deep palpation generalized about the knee bilaterally.  Knee stable varus valgus stress.  Negative anterior drawer posterior drawer.  Negative Silvestre.  Strength appropriate.  Sensation tact distally light touch 2+ pedal pulses leg soft compressible         Radiology:       PA flexion view is taken reviewed evaluate the knees.  Imaging demonstrates mild degenerative changes throughout the knee with medial compartment most affected.  Some evidence of early bone sclerosis.      Previous imaging reviews does show mild degenerative changes no acute fractures noted.  Soft tissues appear within normal limits.        Assessment: Bilateral knee osteoarthritis      Plan:      Recommend  treatment this time.  Recommend anti-inflammatory symptom management as well as formal physical therapy.  Patient not improving may always consider injections and MRI in the future if warranted to rule out any cartilage defects especially given his history of " being a paratrooper.           We will plan for follow up as needed.    All questions were answered.  Patient understands and agrees with the plan.    Kris Manley MD    10/15/2021    CC to Deep Yo MD

## 2021-11-08 ENCOUNTER — TREATMENT (OUTPATIENT)
Dept: PHYSICAL THERAPY | Facility: CLINIC | Age: 56
End: 2021-11-08

## 2021-11-08 DIAGNOSIS — M25.562 PAIN IN BOTH KNEES, UNSPECIFIED CHRONICITY: Primary | ICD-10-CM

## 2021-11-08 DIAGNOSIS — M25.561 PAIN IN BOTH KNEES, UNSPECIFIED CHRONICITY: Primary | ICD-10-CM

## 2021-11-08 PROCEDURE — 97162 PT EVAL MOD COMPLEX 30 MIN: CPT | Performed by: PHYSICAL THERAPIST

## 2021-11-08 NOTE — PROGRESS NOTES
Physical Therapy Initial Evaluation and Plan of Care    Patient: Maninder Edwards   : 1965  Diagnosis/ICD-10 Code:  Pain in both knees, unspecified chronicity [M25.561, M25.562]  Referring practitioner: Kris Manley MD  Date of Initial Visit: 2021  Today's Date: 2021  Patient seen for 1 sessions           Subjective Questionnaire: Oxford Knee Score = 16/48, indicating 33% ability and 67% impairment      Subjective Evaluation    History of Present Illness  Mechanism of injury: Pt with c/o B knee pain. Knee pain at at front of knees and is almost constant, no pain at rest. Has pain with all walking. Feels like knees will buckle at times, R>L, especially with initial standing. Pain with standing. Difficulty with stairs, going up stairs is harder. Difficulty with squatting. Wakes a night due to knee pain. C/o of legs feeling tired with walking. Knees feel stiff in the mornings and after prolonged sitting. Has occasional swelling at knees. Pt takes meloxicam daily. No significant relief with use of ice or heat. 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      Patient Occupation: disabled Quality of life: good    Pain  Current pain ratin  At best pain ratin  At worst pain ratin  Location: B knees  Quality: dull ache  Relieving factors: rest  Aggravating factors: lifting, movement, standing, stairs, prolonged positioning, sleeping, ambulation, squatting and repetitive movement  Progression: worsening    Social Support  Lives in: multiple-level home  Lives with: young children    Diagnostic Tests  X-ray: abnormal (mild degenerative changes)    Patient Goals  Patient goals for therapy: decreased edema, decreased pain, improved balance, increased motion, increased strength and return to sport/leisure activities             Objective          Observations     Additional Knee Observation Details  Gait unremarkable without assistive device.    Tenderness   Left  Knee   Tenderness in the ITB, lateral joint line, medial joint line, patellar tendon and quadriceps tendon.     Right Knee   Tenderness in the ITB, lateral joint line, medial joint line, patellar tendon and quadriceps tendon.     Additional Tenderness Details  Tenderness at all aspect of patella B.    Active Range of Motion   Left Knee   Normal active range of motion    Right Knee   Normal active range of motion    Patellar Mobility   Left Knee Hypomobile in the left medial, left lateral, left superior and left inferior patellar tendon(s).     Right Knee Hypomobile in the medial, lateral, superior and inferior patellar tendon(s).     Strength/Myotome Testing     Left Hip   Planes of Motion   Flexion: 4+  Abduction: 4+    Right Hip   Planes of Motion   Flexion: 4+  Abduction: 4+    Left Knee   Flexion: 4+  Extension: 4+    Right Knee   Flexion: 4+  Extension: 4+    Left Ankle/Foot   Dorsiflexion: 5    Right Ankle/Foot   Dorsiflexion: 5          Assessment & Plan     Assessment  Impairments: abnormal gait, abnormal muscle firing, abnormal or restricted ROM, activity intolerance, impaired balance, impaired physical strength, lacks appropriate home exercise program, pain with function and weight-bearing intolerance  Assessment details: Pt is 57 yo male with c/o B knee pain. Pt presents with tenderness of B quad and patellar tendons, medial and lateral joint line B and all aspects of B patella. Pt with decreased patellar mobility B and tightness of B ITB and quad. Pt is having difficulty and pain with standing and ambulation. Difficulty with stairs. Difficulty sleeping. Oxford knee score indicates 33% function.    Patient presents with the impairments listed above and based on the objective findings and the physical therapy evaluation, the patient’s condition has the potential to improve in response to therapy.   The patient’s condition and/or services required are at a level of complexity that necessitates the skill &  supervision of a physical therapist.    Prognosis: good  Functional Limitations: sleeping, walking, pushing, uncomfortable because of pain, moving in bed, sitting, standing, stooping and unable to perform repetitive tasks  Goals  Plan Goals: STG:  - Pt to report 25% improvement in B knee pain with standing in 3 weeks.  - Pt to report no tenderness to B patellar tendon with palpation in 3 weeks.  - Pt to be independent with HEP in 3 weeks.  LTG:  - Improve Oxford Knee to 70% or better function by discharge.  - Increase B knee and hip strength to 5/5 or greater for improved stair climbing by discharge.  - Pt to rate max pain at 3-4/10 with activity by discharge.    Plan  Therapy options: will be seen for skilled physical therapy services  Planned modality interventions: thermotherapy (hydrocollator packs), electrical stimulation/Russian stimulation, cryotherapy, ultrasound and dry needling  Planned therapy interventions: balance/weight-bearing training, body mechanics training, compression, flexibility, functional ROM exercises, gait training, home exercise program, joint mobilization, manual therapy, neuromuscular re-education, postural training, soft tissue mobilization, strengthening, stretching, therapeutic activities and transfer training  Frequency: 2x week  Duration in weeks: 12  Treatment plan discussed with: patient  Plan details: Minimal treatment this date due to non-coverage by insurance on day of evaluation. Issued HEP of quad stretch, ITB stretch, and SLR with good return demonstration.        Timed:         Manual Therapy:         mins  89020;     Therapeutic Exercise:    5     mins  67972;     Neuromuscular Astrid:        mins  40973;    Therapeutic Activity:          mins  53434;     Gait Training:           mins  35018;     Ultrasound:          mins  67721;    Ionto                                   mins   97584  Can Repos          mins 64020    Un-Timed:  Electrical Stimulation:         mins  30834  ( );  Dry Needling          mins self-pay  Traction          mins 48713  Low Eval          Mins  71951  Mod Eval     30     Mins  14999  High Eval                            Mins  66568  Self - Care                          mins  55073        Timed Treatment:   5   mins   Total Treatment:     35   mins    PT SIGNATURE: Amparo Horton, PT   DATE TREATMENT INITIATED: 11/8/2021    Initial Certification  Certification Period: 2/6/2022  I certify that the therapy services are furnished while this patient is under my care.  The services outlined above are required by this patient, and will be reviewed every 90 days.     PHYSICIAN: Kris Manley MD __________________________________________________________     DATE:  ______________________________________________________    Please sign and return via fax to [phone].. Thank you, [facility].

## 2021-11-10 ENCOUNTER — TREATMENT (OUTPATIENT)
Dept: PHYSICAL THERAPY | Facility: CLINIC | Age: 56
End: 2021-11-10

## 2021-11-10 DIAGNOSIS — M25.562 PAIN IN BOTH KNEES, UNSPECIFIED CHRONICITY: Primary | ICD-10-CM

## 2021-11-10 DIAGNOSIS — M25.561 PAIN IN BOTH KNEES, UNSPECIFIED CHRONICITY: Primary | ICD-10-CM

## 2021-11-10 PROCEDURE — 97110 THERAPEUTIC EXERCISES: CPT | Performed by: PHYSICAL THERAPIST

## 2021-11-10 PROCEDURE — 97112 NEUROMUSCULAR REEDUCATION: CPT | Performed by: PHYSICAL THERAPIST

## 2021-11-10 NOTE — PROGRESS NOTES
Physical Therapy Daily Progress Note      Patient: Maninder Edwards   : 1965  Diagnosis/ICD-10 Code:  Pain in both knees, unspecified chronicity [M25.561, M25.562]  Referring practitioner: Kris Manley MD  Date of Initial Visit: 11/10/2021  Today's Date: 11/10/2021  Patient seen for 2 sessions.  POC 2x/wk x 12 weeks, exp. 1/3/22  Insurance authorization pending    VISIT#: 2        Subjective :  Pt. Doing HEP, tolerated evaluation well.  Pain B knees currently, 6/10, and stiff. He took care of his 80 year old parents yesterday  And that always increases pain in his knees.         Objective     See Exercise, Manual, and Modality Logs for complete treatment.  Progression as noted.     Patient Education:  Discussed trialing ice x 10 minutes when knees increase in pain.  Also, educated on quadriceps muscles and especially VMO.     Assessment/Plan:  Pt. Needed cueing for all exercises, especially to perform quad set and kick in VMOs.  His BVMOs are   weak and needs continued strengthening.       Progress per Plan of Care:  Monitor and continue as appropriate.             Timed:         Manual Therapy:         mins  53000;     Therapeutic Exercise:   20      mins  51338;     Neuromuscular Astrid:   10     mins  39394;    Therapeutic Activity:          mins  45549;     Gait Training:           mins  15286;     Ultrasound:          mins  12163;    Ionto                                   mins   64964  Self Care                            mins   99764  Aquatic                               mins 71796    Un-Timed:  Electrical Stimulation:         mins  49749 ( );  Traction          mins 84733      Timed Treatment:  30    mins   Total Treatment:    30    mins    Anne Bellamy PTA

## 2021-11-15 ENCOUNTER — TREATMENT (OUTPATIENT)
Dept: PHYSICAL THERAPY | Facility: CLINIC | Age: 56
End: 2021-11-15

## 2021-11-15 DIAGNOSIS — M25.561 PAIN IN BOTH KNEES, UNSPECIFIED CHRONICITY: Primary | ICD-10-CM

## 2021-11-15 DIAGNOSIS — M25.562 PAIN IN BOTH KNEES, UNSPECIFIED CHRONICITY: Primary | ICD-10-CM

## 2021-11-15 DIAGNOSIS — Z96.612 STATUS POST REPLACEMENT OF LEFT SHOULDER JOINT: ICD-10-CM

## 2021-11-15 DIAGNOSIS — M25.512 ACUTE PAIN OF LEFT SHOULDER: ICD-10-CM

## 2021-11-15 PROCEDURE — 97530 THERAPEUTIC ACTIVITIES: CPT | Performed by: PHYSICAL THERAPIST

## 2021-11-15 PROCEDURE — 97110 THERAPEUTIC EXERCISES: CPT | Performed by: PHYSICAL THERAPIST

## 2021-11-15 NOTE — PROGRESS NOTES
Physical Therapy Daily Progress Note    VISIT#: 3    Subjective   Maninder Edwards reports that he is still having pain/aching in both knees.  Pain Ratin    Objective     See Exercise, Manual, and Modality Logs for complete treatment.     Patient Education: exercise progressions/rationale    Assessment/Plan   Pt was able to progress strengthening activities with mild complaint of increased discomfort after completing. Added 3 way hip to HEP. Pt could benefit from initiating piriformis/hip stretches next visit.     Progress per Plan of Care and Progress strengthening /stabilization /functional activity          Timed:         Manual Therapy:         mins  68246;     Therapeutic Exercise:    15     mins  89477;     Neuromuscular Astrid:        mins  66194;    Therapeutic Activity:     18     mins  62068;     Gait Training:           mins  00773;     Ultrasound:          mins  55118;    Ionto                                   mins   15941  Self Care                            mins   71341  Canalith Repos                   mins  90096    Un-Timed:  Electrical Stimulation:         mins  61053 ( );  Dry Needling          mins self-pay  Traction          mins 62563  Low Eval          Mins  47766  Mod Eval          Mins  22616  High Eval                            Mins  69020  Re-Eval                               mins  76258    Timed Treatment:   33   mins   Total Treatment:     33   mins    Sonia Mendoza  Physical Therapist Assistant  [place] License # [id number]

## 2021-11-17 ENCOUNTER — TREATMENT (OUTPATIENT)
Dept: PHYSICAL THERAPY | Facility: CLINIC | Age: 56
End: 2021-11-17

## 2021-11-17 DIAGNOSIS — M25.562 PAIN IN BOTH KNEES, UNSPECIFIED CHRONICITY: Primary | ICD-10-CM

## 2021-11-17 DIAGNOSIS — M25.561 PAIN IN BOTH KNEES, UNSPECIFIED CHRONICITY: Primary | ICD-10-CM

## 2021-11-17 PROCEDURE — 97530 THERAPEUTIC ACTIVITIES: CPT | Performed by: PHYSICAL THERAPIST

## 2021-11-17 PROCEDURE — 97110 THERAPEUTIC EXERCISES: CPT | Performed by: PHYSICAL THERAPIST

## 2021-11-17 NOTE — PROGRESS NOTES
Physical Therapy Daily Progress Note    VISIT#: 4    Subjective   Maninder Edwards reports that he had a little increased soreness after last visit. He worked yesterday and his knees always feel really stiff after work.   Pain Rating: 3    Objective     See Exercise, Manual, and Modality Logs for complete treatment.     Patient Education: exercise progression, eccentric quad strength    Assessment/Plan   Pt has difficulty with stairs at work, initiated step up activity and heel taps for quad strengthening. Pt had mild increased discomfort after activities. Pt would benefit from continued LE strengthening for improved motor control.     Progress per Plan of Care and Progress strengthening /stabilization /functional activity          Timed:         Manual Therapy:         mins  18959;     Therapeutic Exercise:    15     mins  19207;     Neuromuscular Astrid:        mins  82639;    Therapeutic Activity:     16     mins  88560;     Gait Training:           mins  31394;     Ultrasound:          mins  43408;    Ionto                                   mins   80518  Self Care                            mins   59346  Canalith Repos                   mins  57015    Un-Timed:  Electrical Stimulation:         mins  68227 ( );  Dry Needling          mins self-pay  Traction          mins 88809  Low Eval          Mins  21824  Mod Eval          Mins  74895  High Eval                            Mins  88080  Re-Eval                               mins  67375    Timed Treatment:   31   mins   Total Treatment:     31   mins    Sonia Mendoza  Physical Therapist Assistant  [place] License # [id number]

## 2021-11-22 ENCOUNTER — TREATMENT (OUTPATIENT)
Dept: PHYSICAL THERAPY | Facility: CLINIC | Age: 56
End: 2021-11-22

## 2021-11-22 DIAGNOSIS — M25.562 PAIN IN BOTH KNEES, UNSPECIFIED CHRONICITY: Primary | ICD-10-CM

## 2021-11-22 DIAGNOSIS — M25.561 PAIN IN BOTH KNEES, UNSPECIFIED CHRONICITY: Primary | ICD-10-CM

## 2021-11-22 PROCEDURE — 97110 THERAPEUTIC EXERCISES: CPT | Performed by: PHYSICAL THERAPIST

## 2021-11-22 PROCEDURE — 97530 THERAPEUTIC ACTIVITIES: CPT | Performed by: PHYSICAL THERAPIST

## 2021-11-24 ENCOUNTER — TELEPHONE (OUTPATIENT)
Dept: PHYSICAL THERAPY | Facility: CLINIC | Age: 56
End: 2021-11-24

## 2021-12-01 ENCOUNTER — TELEPHONE (OUTPATIENT)
Dept: PHYSICAL THERAPY | Facility: CLINIC | Age: 56
End: 2021-12-01

## 2021-12-03 ENCOUNTER — TREATMENT (OUTPATIENT)
Dept: PHYSICAL THERAPY | Facility: CLINIC | Age: 56
End: 2021-12-03

## 2021-12-03 DIAGNOSIS — M25.512 ACUTE PAIN OF LEFT SHOULDER: ICD-10-CM

## 2021-12-03 DIAGNOSIS — M25.562 PAIN IN BOTH KNEES, UNSPECIFIED CHRONICITY: Primary | ICD-10-CM

## 2021-12-03 DIAGNOSIS — M25.561 PAIN IN BOTH KNEES, UNSPECIFIED CHRONICITY: Primary | ICD-10-CM

## 2021-12-03 DIAGNOSIS — Z96.612 STATUS POST REPLACEMENT OF LEFT SHOULDER JOINT: ICD-10-CM

## 2021-12-03 PROCEDURE — 97110 THERAPEUTIC EXERCISES: CPT | Performed by: PHYSICAL THERAPIST

## 2021-12-03 PROCEDURE — 97530 THERAPEUTIC ACTIVITIES: CPT | Performed by: PHYSICAL THERAPIST

## 2021-12-03 NOTE — PROGRESS NOTES
Physical Therapy Daily Progress Note      Patient: Maninder Edwards   : 1965  Diagnosis/ICD-10 Code:  Pain in both knees, unspecified chronicity [M25.561, M25.562]  Referring practitioner: Kris Manley MD  Date of Initial Visit: Type: THERAPY  Noted: 2021  Today's Date: 12/3/2021  Patient seen for 6 sessions         Maninder Edwards reports: he is still having B knee pain with minimal change in pain or symptoms at this stating he continues to hurt and feel tight. Pt. States he currently has no follow up with MD scheduled and had really only been aware of PT as an option for pain relief.      Objective   See Exercise, Manual, and Modality Logs for complete treatment.     Assessment/Plan   Pt. tolerates stretch and exercise well in manageable ranges this date and is encouraged to report back to MD when possible and ask what other potential options to relive pain he may explore to make therapy more beneficial and work more tolerable as well.    Progress strengthening /stabilization /functional activity           Timed:         Manual Therapy:    10     mins  25773;     Therapeutic Exercise:    15     mins  59066;     Therapeutic Activity:      10     mins  48072;     Timed Treatment:   35   mins   Total Treatment:     35   mins    Nydia Figueroa PTA  Physical Therapist Assistant License #[id number]

## 2021-12-06 ENCOUNTER — TREATMENT (OUTPATIENT)
Dept: PHYSICAL THERAPY | Facility: CLINIC | Age: 56
End: 2021-12-06

## 2021-12-06 DIAGNOSIS — M25.562 PAIN IN BOTH KNEES, UNSPECIFIED CHRONICITY: Primary | ICD-10-CM

## 2021-12-06 DIAGNOSIS — M25.561 PAIN IN BOTH KNEES, UNSPECIFIED CHRONICITY: Primary | ICD-10-CM

## 2021-12-06 PROCEDURE — 97110 THERAPEUTIC EXERCISES: CPT | Performed by: PHYSICAL THERAPIST

## 2021-12-06 PROCEDURE — 97530 THERAPEUTIC ACTIVITIES: CPT | Performed by: PHYSICAL THERAPIST

## 2021-12-06 NOTE — PROGRESS NOTES
Physical Therapy Daily Progress Note      Patient: Maninder Edwards   : 1965  Diagnosis/ICD-10 Code:  Pain in both knees, unspecified chronicity [M25.561, M25.562]   Problems Addressed this Visit     None      Visit Diagnoses     Pain in both knees, unspecified chronicity    -  Primary      Diagnoses       Codes Comments    Pain in both knees, unspecified chronicity    -  Primary ICD-10-CM: M25.561, M25.562  ICD-9-CM: 719.46         Referring practitioner: Kris Manley MD  Date of Initial Visit: Type: THERAPY  Noted: 2021  Today's Date: 2021    VISIT#: 7    Subjective : Pt states he had increased pain last week after doing a lot of work at home. Still having pain overall and fluctuates.    Objective : B quad tightness  Added HS curls and leg press this date.  See Exercise, Manual, and Modality Logs for complete treatment.     Assessment/Plan : Continued B knee pain. Good tolerance to ther ex progression with mild increase in pain. Pt will benefit from continued stretching and strengthening to increase functional activity tolerance with decreased knee pain. Pt with good form during all ther ex.    STG:  - Pt to report 25% improvement in B knee pain with standing in 3 weeks. - Not met, progressing  - Pt to report no tenderness to B patellar tendon with palpation in 3 weeks. - MET  - Pt to be independent with HEP in 3 weeks.- Progressing  LTG:  - Improve Oxford Knee to 70% or better function by discharge.  - Increase B knee and hip strength to 5/5 or greater for improved stair climbing by discharge.  - Pt to rate max pain at 3-4/10 with activity by discharge.    Progress per Plan of Care and Progress strengthening /stabilization /functional activity         Timed:         Manual Therapy:    5     mins  69385;     Therapeutic Exercise:    15     mins  62066;     Neuromuscular Astrid:        mins  78104;    Therapeutic Activity:     10     mins  95167;     Gait Training:           mins  06761;      Ultrasound:          mins  35601;    Ionto                                   mins   53519  Self Care                            mins   75901  Canalith Repos                   mins  36546    Un-Timed:  Electrical Stimulation:         mins  67361 ( );  Dry Needling          mins self-pay  Traction          mins 34604  Low Eval          Mins  38857  Mod Eval          Mins  12007  High Eval                            Mins  68395  Re-Eval                               mins  28068    Timed Treatment:   30   mins   Total Treatment:     30   mins    Amparo Horton, PT  Physical Therapist

## 2021-12-08 ENCOUNTER — TREATMENT (OUTPATIENT)
Dept: PHYSICAL THERAPY | Facility: CLINIC | Age: 56
End: 2021-12-08

## 2021-12-08 DIAGNOSIS — M25.561 PAIN IN BOTH KNEES, UNSPECIFIED CHRONICITY: Primary | ICD-10-CM

## 2021-12-08 DIAGNOSIS — M25.562 PAIN IN BOTH KNEES, UNSPECIFIED CHRONICITY: Primary | ICD-10-CM

## 2021-12-08 PROCEDURE — 97110 THERAPEUTIC EXERCISES: CPT | Performed by: PHYSICAL THERAPIST

## 2021-12-08 PROCEDURE — 97530 THERAPEUTIC ACTIVITIES: CPT | Performed by: PHYSICAL THERAPIST

## 2021-12-08 NOTE — PROGRESS NOTES
Physical Therapy Daily Progress Note      Patient: Maninder Edwards   : 1965  Diagnosis/ICD-10 Code:  Pain in both knees, unspecified chronicity [M25.561, M25.562]   Problems Addressed this Visit     None      Visit Diagnoses     Pain in both knees, unspecified chronicity    -  Primary      Diagnoses       Codes Comments    Pain in both knees, unspecified chronicity    -  Primary ICD-10-CM: M25.561, M25.562  ICD-9-CM: 719.46         Referring practitioner: Kris Manley MD  Date of Initial Visit: Type: THERAPY  Noted: 2021  Today's Date: 2021    VISIT#: 8    Subjective : Pt states his knees have been more sore today and has been trying to stay off his feet. Rates current pain at 7/10.  Oxford Knee = 14/48, indicating 29% ability and 71% impairment    Objective :   B knee flex and ext 4+/5, B hip flex and abd = 4+/5  AROM WNL B knees  Tender to palpation B patellar tendon.  See Exercise, Manual, and Modality Logs for complete treatment.     Assessment/Plan : Pt with continued B knee pain and tenderness to B patellar tendons. Pt has tolerated progression of ther ex well with mild symptom increase. Pt with continued difficulty with prolonged ambulation and standing. Plan to trial ultrasound to patellar tendons and knee jt lines next visit. Pt will benefit from continued PT services to progress strength for improved standing and ambulation tolerance.    STG:  - Pt to report 25% improvement in B knee pain with standing in 3 weeks. - Not met, progressing  - Pt to report no tenderness to B patellar tendon with palpation in 3 weeks. - Not met  - Pt to be independent with HEP in 3 weeks. - Progressing  LTG:  - Improve Oxford Knee to 70% or better function by discharge. - Not met  - Increase B knee and hip strength to 5/5 or greater for improved stair climbing by discharge. - Not met, progressing  - Pt to rate max pain at 3-4/10 with activity by discharge. - Not met    Progress per Plan of Care          Timed:         Manual Therapy:         mins  44056;     Therapeutic Exercise:    20     mins  87810;     Neuromuscular Astrid:        mins  82063;    Therapeutic Activity:     10     mins  59592;     Gait Training:           mins  49589;     Ultrasound:          mins  21897;    Ionto                                   mins   46980  Self Care                            mins   07441  Canalith Repos                   mins  90671    Un-Timed:  Electrical Stimulation:         mins  83880 ( );  Dry Needling          mins self-pay  Traction          mins 04994  Low Eval          Mins  07538  Mod Eval          Mins  16021  High Eval                            Mins  71625  Re-Eval                               mins  89428    Timed Treatment:   30   mins   Total Treatment:     40   mins    Amparo Horton, PT  Physical Therapist

## 2021-12-13 ENCOUNTER — TELEPHONE (OUTPATIENT)
Dept: PHYSICAL THERAPY | Facility: CLINIC | Age: 56
End: 2021-12-13

## 2021-12-15 ENCOUNTER — TREATMENT (OUTPATIENT)
Dept: PHYSICAL THERAPY | Facility: CLINIC | Age: 56
End: 2021-12-15

## 2021-12-15 DIAGNOSIS — M25.561 PAIN IN BOTH KNEES, UNSPECIFIED CHRONICITY: Primary | ICD-10-CM

## 2021-12-15 DIAGNOSIS — M25.562 PAIN IN BOTH KNEES, UNSPECIFIED CHRONICITY: Primary | ICD-10-CM

## 2021-12-15 PROCEDURE — 97110 THERAPEUTIC EXERCISES: CPT | Performed by: PHYSICAL THERAPIST

## 2021-12-15 PROCEDURE — 97035 APP MDLTY 1+ULTRASOUND EA 15: CPT | Performed by: PHYSICAL THERAPIST

## 2021-12-15 NOTE — PROGRESS NOTES
Physical Therapy Daily Progress Note      Patient: Maninder Edwards   : 1965  Diagnosis/ICD-10 Code:  Pain in both knees, unspecified chronicity [M25.561, M25.562]   Problems Addressed this Visit     None      Visit Diagnoses     Pain in both knees, unspecified chronicity    -  Primary      Diagnoses       Codes Comments    Pain in both knees, unspecified chronicity    -  Primary ICD-10-CM: M25.561, M25.562  ICD-9-CM: 719.46         Referring practitioner: Kris Manley MD  Date of Initial Visit: Type: THERAPY  Noted: 2021  Today's Date: 12/15/2021    VISIT#: 9    Subjective : States knees are feeling about the same. Rates current pain at 6/10. Reports that heat felt good on knees last visit. Pt is hopeful that his knees will improve even more.    Objective : Performed US to B patellar tendons and knee jt line this date.    See Exercise, Manual, and Modality Logs for complete treatment.     Assessment/Plan : Good tolerance to ther ex with mild c/o pain. Plan to trial US for few visits and will continue if good results with decreased pain. Pt will benefit from continued PT to progress strengthening and use of modalities for pain management.     STG:  - Pt to report 25% improvement in B knee pain with standing in 3 weeks. - Not met, progressing  - Pt to report no tenderness to B patellar tendon with palpation in 3 weeks. - Not met  - Pt to be independent with HEP in 3 weeks. - Progressing  LTG:  - Improve Oxford Knee to 70% or better function by discharge. - Not met  - Increase B knee and hip strength to 5/5 or greater for improved stair climbing by discharge. - Not met, progressing  - Pt to rate max pain at 3-4/10 with activity by discharge. - Not met    Progress per Plan of Care and Progress strengthening /stabilization /functional activity         Timed:         Manual Therapy:         mins  13051;     Therapeutic Exercise:    20     mins  80066;     Neuromuscular Astrid:        mins  38186;     Therapeutic Activity:          mins  00934;     Gait Training:           mins  85728;     Ultrasound:    10      mins  19927;    Ionto                                   mins   95093  Self Care                            mins   76461  Canalith Repos                   mins  14451    Un-Timed:  Electrical Stimulation:         mins  45483 ( );  Dry Needling          mins self-pay  Traction          mins 41150  Low Eval          Mins  17853  Mod Eval          Mins  95199  High Eval                            Mins  83271  Re-Eval                               mins  44498    Timed Treatment:   30   mins   Total Treatment:     45   mins    Amparo Horton, PT  Physical Therapist

## 2021-12-22 ENCOUNTER — TREATMENT (OUTPATIENT)
Dept: PHYSICAL THERAPY | Facility: CLINIC | Age: 56
End: 2021-12-22

## 2021-12-22 DIAGNOSIS — M25.562 PAIN IN BOTH KNEES, UNSPECIFIED CHRONICITY: Primary | ICD-10-CM

## 2021-12-22 DIAGNOSIS — M25.561 PAIN IN BOTH KNEES, UNSPECIFIED CHRONICITY: Primary | ICD-10-CM

## 2021-12-22 PROCEDURE — 97035 APP MDLTY 1+ULTRASOUND EA 15: CPT | Performed by: PHYSICAL THERAPIST

## 2021-12-22 PROCEDURE — 97110 THERAPEUTIC EXERCISES: CPT | Performed by: PHYSICAL THERAPIST

## 2021-12-22 NOTE — PROGRESS NOTES
Physical Therapy Daily Progress Note      Patient: Maninder Edwards   : 1965  Diagnosis/ICD-10 Code:  Pain in both knees, unspecified chronicity [M25.561, M25.562]  Referring practitioner: Kris Manley MD  Date of Initial Visit: Type: THERAPY  Noted: 2021  Today's Date: 2021  Patient seen for 10 sessions         Maninder Edwards reports: his knees continue to bother him but stretch and exercise do help initially. Pt. Reports he does not see an true signifcnat change in symptoms this time and is unsure of next steps. Pt. States the worst aggravating factor is work and states after work yesterday his L knee hurt worse than his R for no rhyme or reason stating it varies which one is effected.    Objective   See Exercise, Manual, and Modality Logs for complete treatment.     Assessment/Plan   Pt. responds well to ultrasound for relief of anterior knee pain in short term and shows good tolerance to exercise this date however overall condition of knee pain is relatively unchanged and still has same results with work despite improved strength and improving mobility with stretch. Pt. Was encouraged to reach out to MD for follow up and discuss other options they may benefit him for pain relief alongside PT to aid in recovery and relief pain onset.    STG:  - Pt to report 25% improvement in B knee pain with standing in 3 weeks. - Not met, progressing  - Pt to report no tenderness to B patellar tendon with palpation in 3 weeks. - Not met  - Pt to be independent with HEP in 3 weeks. - Progressing  LTG:  - Improve Oxford Knee to 70% or better function by discharge. - Not met  - Increase B knee and hip strength to 5/5 or greater for improved stair climbing by discharge. - Not met, progressing  - Pt to rate max pain at 3-4/10 with activity by discharge. - Not met    Progress strengthening /stabilization /functional activity           Timed:            Therapeutic Exercise:    25     mins  58248;      Ultrasound:     10     mins  74508;        Timed Treatment:   35   mins   Total Treatment:     45   mins    Nydia Figueroa PTA  Physical Therapist Assistant License #[id number]

## 2021-12-29 ENCOUNTER — TELEPHONE (OUTPATIENT)
Dept: PHYSICAL THERAPY | Facility: CLINIC | Age: 56
End: 2021-12-29

## 2022-01-05 ENCOUNTER — TELEPHONE (OUTPATIENT)
Dept: PHYSICAL THERAPY | Facility: CLINIC | Age: 57
End: 2022-01-05

## 2022-04-25 NOTE — PROGRESS NOTES
Chief Complaint  Migraine    Subjective          Maninder Edwards presents to [facility] for Migraines  History of Present Illness   Patient is here for follow up on Migraines.   He states migraines are worst since last visit he states he gets them about every 2 weeks   he states his  Last migraine was last night and started sunday morning  Location of ha typical frontal, recent ha mostly back on the right.   Associated with nausea, light sensitive.   Duration  1 to 3 days    he is currently taking imitrex 100 mg prn,    amitriptyline 10 mg     and topamax 50 mg 1 qam and 2 qhs.     Medication seems to helps some, reports ot side effects from medication          =====previous ov 4/26/21 dr seipel=====  Patient is here for follow up on headaches  worse than last visit pt states headaches have increased frontal  headaches with some nausea      Last headache Friday 04/24/2021that lasted until Saturday pt states bright light triggers the headaches either on the right side or left that last 1-2 days   Lasted 2 days pt feels worn out fatigued   Medication Topamax Imitrex Elavil  feels like it helped some but pt states feels wiped out   Frequency once per week      Current Outpatient Medications:   •  amitriptyline (ELAVIL) 10 MG tablet, Two tab po at hs, Disp: 180 tablet, Rfl: 3  •  gabapentin (NEURONTIN) 300 MG capsule, TAKE 1 TO 3 CAPSULES BY MOUTH AT BEDTIME, Disp: 90 capsule, Rfl: 1  •  meloxicam (Mobic) 15 MG tablet, Take 1 tablet by mouth Daily., Disp: 30 tablet, Rfl: 12  •  topiramate (Topamax) 100 MG tablet, Take 1 tablet by mouth 2 (Two) Times a Day. One in am and two at hs, Disp: 180 tablet, Rfl: 3  •  rizatriptan (Maxalt) 10 MG tablet, Take 1 tablet by mouth As Needed for Migraine. May repeat in 2 hours if needed, Disp: 6 tablet, Rfl: 11    Review of Systems   Constitutional: Positive for fatigue.   HENT: Positive for tinnitus.    Musculoskeletal: Positive for joint swelling.  "  Neurological: Positive for weakness and headaches.   All other systems reviewed and are negative.         Objective:    Vital Signs:   BP 95/59   Pulse 65   Temp 98.4 °F (36.9 °C) (Temporal)   Ht 175.3 cm (69\")   Wt 59.9 kg (132 lb)   BMI 19.49 kg/m²     Physical Exam  Vitals reviewed.   Constitutional:       Appearance: Normal appearance.   Pulmonary:      Effort: Pulmonary effort is normal. No respiratory distress.   Neurological:      General: No focal deficit present.      Mental Status: He is alert and oriented to person, place, and time.   Psychiatric:         Mood and Affect: Mood normal.        Result Review :                Neurologic Exam     Mental Status   Oriented to person, place, and time.         Assessment and Plan    Diagnoses and all orders for this visit:    1. Intractable migraine without aura and without status migrainosus (Primary)    Other orders  -     amitriptyline (ELAVIL) 10 MG tablet; Two tab po at hs  Dispense: 180 tablet; Refill: 3  -     rizatriptan (Maxalt) 10 MG tablet; Take 1 tablet by mouth As Needed for Migraine. May repeat in 2 hours if needed  Dispense: 6 tablet; Refill: 11  -     topiramate (Topamax) 100 MG tablet; Take 1 tablet by mouth 2 (Two) Times a Day. One in am and two at hs  Dispense: 180 tablet; Refill: 3         Follow Up   Return in about 1 year (around 4/26/2023).  Patient was given instructions and counseling regarding his condition or for health maintenance advice. Please see specific information pulled into the AVS if appropriate.     This document has been electronically signed by Joseph Seipel, MD on April 26, 2022 10:47 EDT      "

## 2022-04-26 ENCOUNTER — OFFICE VISIT (OUTPATIENT)
Dept: NEUROLOGY | Facility: CLINIC | Age: 57
End: 2022-04-26

## 2022-04-26 VITALS
SYSTOLIC BLOOD PRESSURE: 95 MMHG | BODY MASS INDEX: 19.55 KG/M2 | TEMPERATURE: 98.4 F | DIASTOLIC BLOOD PRESSURE: 59 MMHG | HEIGHT: 69 IN | HEART RATE: 65 BPM | WEIGHT: 132 LBS

## 2022-04-26 DIAGNOSIS — G43.019 INTRACTABLE MIGRAINE WITHOUT AURA AND WITHOUT STATUS MIGRAINOSUS: Primary | ICD-10-CM

## 2022-04-26 PROCEDURE — 99214 OFFICE O/P EST MOD 30 MIN: CPT | Performed by: PSYCHIATRY & NEUROLOGY

## 2022-04-26 RX ORDER — AMITRIPTYLINE HYDROCHLORIDE 10 MG/1
TABLET, FILM COATED ORAL
Qty: 180 TABLET | Refills: 3 | Status: SHIPPED | OUTPATIENT
Start: 2022-04-26 | End: 2023-03-02

## 2022-04-26 RX ORDER — RIZATRIPTAN BENZOATE 10 MG/1
10 TABLET ORAL AS NEEDED
Qty: 6 TABLET | Refills: 11 | Status: SHIPPED | OUTPATIENT
Start: 2022-04-26

## 2022-04-26 RX ORDER — TOPIRAMATE 100 MG/1
100 TABLET, FILM COATED ORAL 2 TIMES DAILY
Qty: 180 TABLET | Refills: 3 | Status: SHIPPED | OUTPATIENT
Start: 2022-04-26

## 2022-06-13 ENCOUNTER — DOCUMENTATION (OUTPATIENT)
Dept: PHYSICAL THERAPY | Facility: CLINIC | Age: 57
End: 2022-06-13

## 2022-06-13 DIAGNOSIS — M25.561 PAIN IN BOTH KNEES, UNSPECIFIED CHRONICITY: Primary | ICD-10-CM

## 2022-06-13 DIAGNOSIS — M25.562 PAIN IN BOTH KNEES, UNSPECIFIED CHRONICITY: Primary | ICD-10-CM

## 2022-06-13 NOTE — PROGRESS NOTES
Discharge Summary  Discharge Summary from Physical Therapy Report      Dates  PT visit: 11/8/2021 - 12/22/2021  Number of Visits: 10     Discharge Status of Patient: See Progress Note dated 12/22/2021    Goals: Partially Met    Discharge Plan: Continue with current home exercise program as instructed    Comments :     Date of Discharge 6/13/2022        Amparo Horton, PT  Physical Therapist  [place] Lic# [id number]

## 2022-07-12 NOTE — PROGRESS NOTES
Physical Therapy Daily Progress Note      Patient: Maninder Edwards   : 1965  Diagnosis/ICD-10 Code:  Pain in both knees, unspecified chronicity [M25.561, M25.562]  Referring practitioner: Kris Manley MD  Date of Initial Visit: Type: THERAPY  Noted: 2021  Today's Date: 2021  Patient seen for 5 sessions         Maninder Edwards reports: he has still been having B knee pain with ascending and descending stairs Pt. States last time he was asked if going down stairs was painful and he wasn't sure but since paying attention at work he has noticed that going is actually the more painful of the two. Pt. States he has done well with the exercises and has been adamant to improve.    Objective   See Exercise, Manual, and Modality Logs for complete treatment.     Assessment/Plan  Pt. Tolerates exercise well this visit and completes all activity without pain at this time. Pt. States today's exercise was a good workout. Pt. May benefit from distal quad focus with exercise and Pt. Was educated of the benefit of equal quad strength.    Progress strengthening /stabilization /functional activity           Timed:         Manual Therapy:    5     mins  81922;     Therapeutic Exercise:    15     mins  88439;     Therapeutic Activity:     10     mins  68641;         Timed Treatment:   30   mins   Total Treatment:     30   mins    Nydia Figueroa PTA  Physical Therapist Assistant License #[id number]       Is This A New Presentation, Or A Follow-Up?: Actinic Keratosis Is This Lesion Biopsy Proven?: Yes Additional History: Pt had BX 3/2022- path proven [place]

## 2022-12-12 ENCOUNTER — OFFICE VISIT (OUTPATIENT)
Dept: ORTHOPEDIC SURGERY | Facility: CLINIC | Age: 57
End: 2022-12-12

## 2022-12-12 VITALS — BODY MASS INDEX: 19.55 KG/M2 | HEIGHT: 69 IN | HEART RATE: 108 BPM | WEIGHT: 132 LBS | OXYGEN SATURATION: 98 %

## 2022-12-12 DIAGNOSIS — G89.29 CHRONIC RIGHT SHOULDER PAIN: Primary | ICD-10-CM

## 2022-12-12 DIAGNOSIS — M25.511 CHRONIC RIGHT SHOULDER PAIN: Primary | ICD-10-CM

## 2022-12-12 PROCEDURE — 99203 OFFICE O/P NEW LOW 30 MIN: CPT | Performed by: FAMILY MEDICINE

## 2022-12-12 NOTE — PROGRESS NOTES
Primary Care Sports Medicine Office Visit Note     Patient ID: Maninder Edwards is a 57 y.o. male.    Chief Complaint:  Chief Complaint   Patient presents with   • Right Shoulder - Pain     HPI:    Mr. Maninder Edwards is a 57 y.o. male. The patient presents to the clinic today for right shoulder pain.    The patient states that he had a left total shoulder arthroplasty in 2019. He states that his right shoulder is doing the exact same thing. He adds that if he lays on his right shoulder he experiences a click. He notes that he was in the [employer]. He reports that he has been pretty active his whole life. He states that he does not use heavy weights for exercise. The patient admits that he does not try to take any medication. He adds that he has had injections in his right shoulder while in the . He states that he is ambidextrous, but his right hand is his dominant hand. He states that he would like to get his right shoulder fixed as quick as possible. He states that he is very aware of his right shoulder. He adds that he is limited in doing things. He denies any bowel or urinary incontinence. He denies any respiratory issues.    Past Medical History:   Diagnosis Date   • Degenerative joint disease of shoulder    • GERD (gastroesophageal reflux disease)    • Hypermetropia    • Impotence of organic origin    • Migraine    • Mixed hyperlipidemia    • Presbyopia        Past Surgical History:   Procedure Laterality Date   • TOTAL SHOULDER REPLACEMENT Left 2019       Family History   Problem Relation Age of Onset   • No Known Problems Mother    • Heart disease Father      Social History     Occupational History   • Not on file   Tobacco Use   • Smoking status: Former     Packs/day: 0.50     Years: 10.00     Pack years: 5.00     Types: Cigarettes     Quit date: 2009     Years since quittin.5   • Smokeless tobacco: Never   Vaping Use   • Vaping Use: Never used   Substance and Sexual  "Activity   • Alcohol use: No   • Drug use: Never   • Sexual activity: Yes     Partners: Female      Review of Systems   Constitutional: Negative for activity change, fatigue and fever.   Musculoskeletal: Positive for arthralgias.   Skin: Negative for color change and rash.   Neurological: Negative for numbness.     Objective:    Pulse 108   Ht 175.3 cm (69\")   Wt 59.9 kg (132 lb)   SpO2 98%   BMI 19.49 kg/m²     Physical Examination:  Physical Exam  Vitals and nursing note reviewed.   Constitutional:       General: He is not in acute distress.     Appearance: He is well-developed. He is not diaphoretic.   HENT:      Head: Normocephalic and atraumatic.   Eyes:      Conjunctiva/sclera: Conjunctivae normal.   Pulmonary:      Effort: Pulmonary effort is normal. No respiratory distress.   Skin:     General: Skin is warm.      Capillary Refill: Capillary refill takes less than 2 seconds.   Neurological:      Mental Status: He is alert.       Ortho Exam   Right shoulder examination, range of motion reveals about 90 degrees of lateral abduction, about 140 degrees of forward flexion. Negative Juan C test. Negative resisted external rotation. Negative belly press. Negative Speed's test. Negative Yergason's test. Positive Scarf test for pain only.      Imaging and other tests:  Three view X-ray of the right shoulder today yields complete degradation of joint space with bone on bone osteoarthritic change of the humeral head. There is mild cystic change and inferior osteophytic activity to both the glenoid and the humeral head inferiorly.    Assessment and Plan:    1. Chronic right shoulder pain  - XR Shoulder 2+ View Right    2. Severe primary osteoarthritis of the right shoulder.    Plan:  I discussed treatment options with the patient today and though he has not attempted corticosteroid injections, he is well past conservative measurements with the shoulder. He is interested in total shoulder arthroplasty. Therefore, I will " get him to return at first available appointment to see my surgical partner Dr. Hong Lopez, for further evaluation as he sees fit.    Transcribed from ambient dictation for Jose Dangelo II, DO by Nubia Lima.  12/12/22   14:28 EST    Patient or patient representative verbalized consent to the visit recording.  I have personally performed the services described in this document as transcribed by the above individual, and it is both accurate and complete.   Nubia Lima    Disclaimer: Please note that areas of this note were completed with computer voice recognition software.  Quite often unanticipated grammatical, syntax, homophones, and other interpretive errors are inadvertently transcribed by the computer software. Please excuse any errors that have escaped final proofreading.

## 2023-01-12 ENCOUNTER — OFFICE VISIT (OUTPATIENT)
Dept: ORTHOPEDIC SURGERY | Facility: CLINIC | Age: 58
End: 2023-01-12
Payer: OTHER GOVERNMENT

## 2023-01-12 ENCOUNTER — PREP FOR SURGERY (OUTPATIENT)
Dept: OTHER | Facility: HOSPITAL | Age: 58
End: 2023-01-12
Payer: OTHER GOVERNMENT

## 2023-01-12 VITALS — BODY MASS INDEX: 19.4 KG/M2 | HEIGHT: 69 IN | HEART RATE: 98 BPM | WEIGHT: 131 LBS

## 2023-01-12 DIAGNOSIS — M19.011 OSTEOARTHRITIS OF RIGHT GLENOHUMERAL JOINT: Primary | ICD-10-CM

## 2023-01-12 DIAGNOSIS — Z47.89 ORTHOPEDIC AFTERCARE: ICD-10-CM

## 2023-01-12 PROCEDURE — 99214 OFFICE O/P EST MOD 30 MIN: CPT | Performed by: ORTHOPAEDIC SURGERY

## 2023-01-12 PROCEDURE — 97760 ORTHOTIC MGMT&TRAING 1ST ENC: CPT | Performed by: ORTHOPAEDIC SURGERY

## 2023-01-12 RX ORDER — OXYCODONE HCL 10 MG/1
10 TABLET, FILM COATED, EXTENDED RELEASE ORAL ONCE
Status: CANCELLED | OUTPATIENT
Start: 2023-01-12 | End: 2023-01-12

## 2023-01-12 RX ORDER — TRANEXAMIC ACID 10 MG/ML
1000 INJECTION, SOLUTION INTRAVENOUS ONCE
Status: CANCELLED | OUTPATIENT
Start: 2023-01-12 | End: 2023-01-12

## 2023-01-12 RX ORDER — MELOXICAM 15 MG/1
15 TABLET ORAL ONCE
Status: CANCELLED | OUTPATIENT
Start: 2023-01-12 | End: 2023-01-12

## 2023-01-12 RX ORDER — PREGABALIN 75 MG/1
150 CAPSULE ORAL ONCE
Status: CANCELLED | OUTPATIENT
Start: 2023-01-12 | End: 2023-01-12

## 2023-01-12 NOTE — PROGRESS NOTES
"     Patient ID: Maninder Edwards is a 57 y.o. male.    Chief Complaint:    Chief Complaint   Patient presents with   • Right Shoulder - Initial Evaluation, Pain     Pain 4        HPI:  This is a 57-year-old gentleman here with longstanding right shoulder pain.  There is no injury or previous surgery he is taken some oral medication try to rest ice and use heat but has no significant relief he has lost range of motion has pain at night  Past Medical History:   Diagnosis Date   • Degenerative joint disease of shoulder    • GERD (gastroesophageal reflux disease)    • Hypermetropia    • Impotence of organic origin    • Migraine    • Mixed hyperlipidemia    • Presbyopia        Past Surgical History:   Procedure Laterality Date   • TOTAL SHOULDER REPLACEMENT Left 2019       Family History   Problem Relation Age of Onset   • No Known Problems Mother    • Heart disease Father           Social History     Occupational History   • Not on file   Tobacco Use   • Smoking status: Former     Packs/day: 0.50     Years: 10.00     Pack years: 5.00     Types: Cigarettes     Quit date: 2009     Years since quittin.6   • Smokeless tobacco: Never   Vaping Use   • Vaping Use: Never used   Substance and Sexual Activity   • Alcohol use: No   • Drug use: Never   • Sexual activity: Yes     Partners: Female      Review of Systems   Cardiovascular: Negative for chest pain.   Musculoskeletal: Positive for arthralgias.       Objective:    Pulse 98   Ht 175.3 cm (69\")   Wt 59.4 kg (131 lb)   BMI 19.35 kg/m²     Physical Examination:  Right shoulder has intact skin he has moderate pain over the bicep groove passive elevation 150 abduction 120 external rotation 30 internal rotation the right hip with mild pain but no weakness on belly press and liftoff which are 5/5.  He has mild pain but no weakness on Speed, [place], supraspinatus testing  Sensory and motor exam are intact all distributions. Radial pulse is palpable and capillary " refill is less than two seconds to all digits.    Imaging:  Previous x-rays demonstrate end-stage degenerative joint disease minimal to no glenoid abnormality    Assessment:  Right shoulder degenerative joint disease    Plan:  Treatment options discussed he would like to proceed with right total shoulder arthroplasty.  Discussed the low probability of possible reverse total shoulder arthroplasty.  Postop sling dispensed obtain preop MRI  Risks and benefits, specifically risks of bleeding, scar, infection, fracture, stiffness, retear, nerve, tendon or artery damage, the need for further surgery, DVT, and loss of life or limb and rehab were discussed. All questions were answered and addressed.  Greater than 15 minutes was spent demonstrating proper fit and use of the device and signs to monitor for complications  He would like to have surgery done as an outpatient      Procedures         Disclaimer: Part of this note may be an electronic transcription/translation of spoken language to printed text using the Dragon Dictation System

## 2023-01-30 ENCOUNTER — TELEPHONE (OUTPATIENT)
Dept: ORTHOPEDIC SURGERY | Facility: CLINIC | Age: 58
End: 2023-01-30
Payer: OTHER GOVERNMENT

## 2023-01-30 NOTE — TELEPHONE ENCOUNTER
Caller: Maninder Edwards    Relationship: Self    Best call back number:    What is the best time to reach you: ANY    Who are you requesting to speak with (clinical staff, provider,  specific staff member): CLINICAL    What was the call regarding: WANTS TO KNOW A [place]ALL [place] TIME IN WHICH HE WILL BE STARTING PHYSICAL THERAPY AFTER HIS SURGERY     Do you require a callback: YES

## 2023-01-31 NOTE — TELEPHONE ENCOUNTER
I left message that he will start PT immediately and Cindy Ha from [facility] will be in contact.

## 2023-02-06 ENCOUNTER — HOSPITAL ENCOUNTER (OUTPATIENT)
Dept: CARDIOLOGY | Facility: HOSPITAL | Age: 58
Discharge: HOME | End: 2023-02-06
Payer: OTHER GOVERNMENT

## 2023-02-06 ENCOUNTER — LAB (OUTPATIENT)
Dept: LAB | Facility: HOSPITAL | Age: 58
End: 2023-02-06
Payer: OTHER GOVERNMENT

## 2023-02-06 ENCOUNTER — PRE-ADMISSION TESTING (OUTPATIENT)
Dept: PREADMISSION TESTING | Facility: HOSPITAL | Age: 58
End: 2023-02-06
Payer: OTHER GOVERNMENT

## 2023-02-06 ENCOUNTER — HOSPITAL ENCOUNTER (OUTPATIENT)
Dept: MRI IMAGING | Facility: HOSPITAL | Age: 58
Discharge: HOME | End: 2023-02-06
Payer: OTHER GOVERNMENT

## 2023-02-06 VITALS
HEART RATE: 98 BPM | RESPIRATION RATE: 18 BRPM | OXYGEN SATURATION: 98 % | HEIGHT: 68 IN | WEIGHT: 122 LBS | DIASTOLIC BLOOD PRESSURE: 87 MMHG | BODY MASS INDEX: 18.49 KG/M2 | SYSTOLIC BLOOD PRESSURE: 127 MMHG | TEMPERATURE: 97.6 F

## 2023-02-06 DIAGNOSIS — M19.011 OSTEOARTHRITIS OF RIGHT GLENOHUMERAL JOINT: ICD-10-CM

## 2023-02-06 LAB
ABO GROUP BLD: NORMAL
ANION GAP SERPL CALCULATED.3IONS-SCNC: 5.4 MMOL/L (ref 5–15)
APTT PPP: 27.6 SECONDS (ref 24–31)
BASOPHILS # BLD AUTO: 0.06 10*3/MM3 (ref 0–0.2)
BASOPHILS NFR BLD AUTO: 0.8 % (ref 0–1.5)
BLD GP AB SCN SERPL QL: NEGATIVE
BUN SERPL-MCNC: 22 MG/DL (ref 6–20)
BUN/CREAT SERPL: 18.2 (ref 7–25)
CALCIUM SPEC-SCNC: 9.5 MG/DL (ref 8.6–10.5)
CHLORIDE SERPL-SCNC: 101 MMOL/L (ref 98–107)
CO2 SERPL-SCNC: 30.6 MMOL/L (ref 22–29)
CREAT SERPL-MCNC: 1.21 MG/DL (ref 0.76–1.27)
DEPRECATED RDW RBC AUTO: 39.3 FL (ref 37–54)
EGFRCR SERPLBLD CKD-EPI 2021: 69.4 ML/MIN/1.73
EOSINOPHIL # BLD AUTO: 0.1 10*3/MM3 (ref 0–0.4)
EOSINOPHIL NFR BLD AUTO: 1.4 % (ref 0.3–6.2)
ERYTHROCYTE [DISTWIDTH] IN BLOOD BY AUTOMATED COUNT: 12.2 % (ref 12.3–15.4)
GLUCOSE SERPL-MCNC: 101 MG/DL (ref 65–99)
HBA1C MFR BLD: 4.9 % (ref 3.5–5.6)
HCT VFR BLD AUTO: 44.1 % (ref 37.5–51)
HGB BLD-MCNC: 15.1 G/DL (ref 13–17.7)
IMM GRANULOCYTES # BLD AUTO: 0.01 10*3/MM3 (ref 0–0.05)
IMM GRANULOCYTES NFR BLD AUTO: 0.1 % (ref 0–0.5)
INR PPP: 1.06 (ref 0.93–1.1)
LYMPHOCYTES # BLD AUTO: 2.37 10*3/MM3 (ref 0.7–3.1)
LYMPHOCYTES NFR BLD AUTO: 32.7 % (ref 19.6–45.3)
MCH RBC QN AUTO: 29.9 PG (ref 26.6–33)
MCHC RBC AUTO-ENTMCNC: 34.2 G/DL (ref 31.5–35.7)
MCV RBC AUTO: 87.3 FL (ref 79–97)
MONOCYTES # BLD AUTO: 0.8 10*3/MM3 (ref 0.1–0.9)
MONOCYTES NFR BLD AUTO: 11 % (ref 5–12)
NEUTROPHILS NFR BLD AUTO: 3.91 10*3/MM3 (ref 1.7–7)
NEUTROPHILS NFR BLD AUTO: 54 % (ref 42.7–76)
NRBC BLD AUTO-RTO: 0 /100 WBC (ref 0–0.2)
PLATELET # BLD AUTO: 329 10*3/MM3 (ref 140–450)
PMV BLD AUTO: 9.2 FL (ref 6–12)
POTASSIUM SERPL-SCNC: 3.8 MMOL/L (ref 3.5–5.2)
PROTHROMBIN TIME: 10.9 SECONDS (ref 9.6–11.7)
RBC # BLD AUTO: 5.05 10*6/MM3 (ref 4.14–5.8)
RH BLD: NEGATIVE
SODIUM SERPL-SCNC: 137 MMOL/L (ref 136–145)
T&S EXPIRATION DATE: NORMAL
WBC NRBC COR # BLD: 7.25 10*3/MM3 (ref 3.4–10.8)

## 2023-02-06 PROCEDURE — 86901 BLOOD TYPING SEROLOGIC RH(D): CPT

## 2023-02-06 PROCEDURE — 86900 BLOOD TYPING SEROLOGIC ABO: CPT

## 2023-02-06 PROCEDURE — 93010 ELECTROCARDIOGRAM REPORT: CPT | Performed by: INTERNAL MEDICINE

## 2023-02-06 PROCEDURE — 85730 THROMBOPLASTIN TIME PARTIAL: CPT

## 2023-02-06 PROCEDURE — 85025 COMPLETE CBC W/AUTO DIFF WBC: CPT

## 2023-02-06 PROCEDURE — 73221 MRI JOINT UPR EXTREM W/O DYE: CPT

## 2023-02-06 PROCEDURE — 97166 OT EVAL MOD COMPLEX 45 MIN: CPT

## 2023-02-06 PROCEDURE — 93005 ELECTROCARDIOGRAM TRACING: CPT | Performed by: ORTHOPAEDIC SURGERY

## 2023-02-06 PROCEDURE — 83036 HEMOGLOBIN GLYCOSYLATED A1C: CPT

## 2023-02-06 PROCEDURE — 36415 COLL VENOUS BLD VENIPUNCTURE: CPT

## 2023-02-06 PROCEDURE — 86850 RBC ANTIBODY SCREEN: CPT

## 2023-02-06 PROCEDURE — 85610 PROTHROMBIN TIME: CPT

## 2023-02-06 PROCEDURE — 80048 BASIC METABOLIC PNL TOTAL CA: CPT

## 2023-02-06 NOTE — THERAPY EVALUATION
Patient Name: Maninder Edwards  : 1965    MRN: [id number]                              Today's Date: 2023       Admit Date: (Not on file)    Visit Dx: No diagnosis found.  Patient Active Problem List   Diagnosis   • Degenerative joint disease of shoulder, left   • Mixed hyperlipidemia   • GERD (gastroesophageal reflux disease)   • Impotence of organic origin   • Other headache syndrome   • Drusen (degenerative) of macula, right eye   • Hypermetropia   • Presbyopia of both eyes   • Hypermetropia of both eyes   • Migraine   • DJD of right shoulder     Past Medical History:   Diagnosis Date   • Degenerative joint disease of shoulder    • GERD (gastroesophageal reflux disease)    • Hypermetropia    • Impotence of organic origin    • Migraine    • Mixed hyperlipidemia    • Presbyopia      Past Surgical History:   Procedure Laterality Date   • JOINT REPLACEMENT     • TOTAL SHOULDER REPLACEMENT Left 2019      General Information     Row Name 23 1312          OT Time and Intention    Document Type evaluation  -ES     Mode of Treatment occupational therapy  -ES     Row Name 23 1312          General Information    Prior Level of Function independent:  -ES     Row Name 23 1312          Occupational Profile    Reason for Services/Referral (Occupational Profile) Pt is a 58 yo male seen preoperatively for R rTSA scheduled 2/15/23. PMHx significant [facility]), R visual deficit, and HLD.  At baseline, he is independent, driving, and resides in a multi-level home with his 16 year old son. Patient is an active participant in his mother's care, and also has social support of his adult daughter.  -ES     Successful Occupations (Occupational Profile) Retired [employer]  -ES     Row Name 23 1312          Living Environment    People in Home child(michael), dependent  -ES     Row Name 23 1312          Home Main Entrance    Number of Stairs, Main Entrance none  -ES     Row Name 23  1312          Stairs Within Home, Primary    Number of Stairs, Within Home, Primary twelve  -ES     Row Name 02/06/23 1312          Cognition    Orientation Status (Cognition) oriented x 4  -ES     Row Name 02/06/23 1312          Safety Issues, Functional Mobility    Impairments Affecting Function (Mobility) grasp;pain;range of motion (ROM)  -ES           User Key  (r) = Recorded By, (t) = Taken By, (c) = Cosigned By    Initials Name Provider Type    ES Jessica Tinajero OT Occupational Therapist                 Mobility/ADL's     Row Name 02/06/23 1331          Bed Mobility    Bed Mobility bed mobility (all) activities  -ES     All Activities, Woodson (Bed Mobility) independent  -ES     Row Name 02/06/23 1331          Functional Mobility    Functional Mobility- Ind. Level independent  -ES     Row Name 02/06/23 1331          Activities of Daily Living    BADL Assessment/Intervention upper body dressing;lower body dressing  -ES     Row Name 02/06/23 1331          Mobility    Extremity Weight-bearing Status right upper extremity  -ES     Right Upper Extremity (Weight-bearing Status) non weight-bearing (NWB)  -ES     Row Name 02/06/23 1331          Upper Body Dressing Assessment/Training    Woodson Level (Upper Body Dressing) moderate assist (50% patient effort)  -ES     Comment, (Upper Body Dressing) Sling and Ice pack  -ES     Row Name 02/06/23 1331          Lower Body Dressing Assessment/Training    Comment, (Lower Body Dressing) Min, while completing with mock TSA precautionsp  -ES           User Key  (r) = Recorded By, (t) = Taken By, (c) = Cosigned By    Initials Name Provider Type    ES Jessica Tinajero OT Occupational Therapist               Obj/Interventions     Row Name 02/06/23 1336          Sensory Assessment (Somatosensory)    Sensory Assessment (Somatosensory) sensation intact  -ES     Row Name 02/06/23 1336          Vision Assessment/Intervention    Visual Impairment/Limitations  WNL;corrective lenses for reading  -ES     Row Name 02/06/23 1336          Range of Motion Comprehensive    General Range of Motion no range of motion deficits identified  -ES     Row Name 02/06/23 1336          Strength Comprehensive (MMT)    General Manual Muscle Testing (MMT) Assessment no strength deficits identified  -ES     Row Name 02/06/23 1336          Balance    Balance Assessment sitting static balance;sitting dynamic balance;standing dynamic balance;standing static balance  -ES     Static Sitting Balance independent  -ES     Dynamic Sitting Balance independent  -ES     Static Standing Balance independent  -ES     Dynamic Standing Balance independent  -ES           User Key  (r) = Recorded By, (t) = Taken By, (c) = Cosigned By    Initials Name Provider Type    DORCAS Jessica Tinajero, OT Occupational Therapist               Goals/Plan     Row Name 02/06/23 1354          Bed Mobility Goal 1 (OT)    Activity/Assistive Device (Bed Mobility Goal 1, OT) bed mobility activities, all  -ES     Davie Level/Cues Needed (Bed Mobility Goal 1, OT) standby assist  -ES     Time Frame (Bed Mobility Goal 1, OT) 2 weeks  -ES     Row Name 02/06/23 1354          Bathing Goal 1 (OT)    Activity/Device (Bathing Goal 1, OT) bathing skills, all  -ES     Davie Level/Cues Needed (Bathing Goal 1, OT) modified independence  -ES     Time Frame (Bathing Goal 1, OT) 2 weeks  -ES     Row Name 02/06/23 1359          Dressing Goal 1 (OT)    Activity/Device (Dressing Goal 1, OT) dressing skills, all  -ES     Davie/Cues Needed (Dressing Goal 1, OT) supervision required  -ES     Time Frame (Dressing Goal 1, OT) 2 weeks  -ES     Row Name 02/06/23 1354          Strength Goal 1 (OT)    Strength Goal 1 (OT) TSA HEP without assist  -ES     Time Frame (Strength Goal 1, OT) 2 weeks  -ES     Row Name 02/06/23 1354          Therapy Assessment/Plan (OT)    Planned Therapy Interventions (OT) activity tolerance training;BADL  retraining;functional balance retraining;IADL retraining;occupation/activity based interventions;passive ROM/stretching;patient/caregiver education/training;ROM/therapeutic exercise;strengthening exercise  -ES           User Key  (r) = Recorded By, (t) = Taken By, (c) = Cosigned By    Initials Name Provider Type    Jessica El OT Occupational Therapist               Clinical Impression     Row Name 02/06/23 1344          Pain Assessment    Pretreatment Pain Rating 3/10  -ES     Posttreatment Pain Rating 7/10  -ES     Pain Location - Side/Orientation Right  -ES     Pain Location - shoulder  -ES     Row Name 02/06/23 1344          Plan of Care Review    Plan of Care Reviewed With patient  -ES     Outcome Evaluation Pt is a 58 yo male seen preoperatively for R rTSA scheduled 2/15/23. PMHx significant for [facility]), R visual deficit, tinnitis, and HLD. At baseline, he is independent, driving, and resides in a multi-level home with his 16 year old son. Patient is an active participant in his mother's care, and also has social support of his adult daughter. Patient is pleasant and eager to learn post-surgical precautions. OT educated on donning/doffing abductor sling and Cold Therapy, HEP, and ADL modifications during post-surgical recovery period. Patient demos good understanding. Questions regarding IADLs answered. Will follow up following surgery. Goals written for post-surgical care.  -ES     Row Name 02/06/23 1344          Therapy Assessment/Plan (OT)    Rehab Potential (OT) good, to achieve stated therapy goals  -ES     Criteria for Skilled Therapeutic Interventions Met (OT) yes  -ES     Therapy Frequency (OT) 5 times/wk  -ES     Row Name 02/06/23 1344          Vital Signs    O2 Delivery Pre Treatment room air  -ES     O2 Delivery Intra Treatment room air  -ES     O2 Delivery Post Treatment room air  -ES     Pre Patient Position Sitting  -ES     Intra Patient Position Standing  -ES     Post  Patient Position Sitting  -ES     Row Name 02/06/23 1345          Positioning and Restraints    Pre-Treatment Position sitting in chair/recliner  -ES     Post Treatment Position chair  -ES     In Chair notified nsg  -ES           User Key  (r) = Recorded By, (t) = Taken By, (c) = Cosigned By    Initials Name Provider Type    Jessica El OT Occupational Therapist               Outcome Measures     Row Name 02/06/23 1355          How much help from another is currently needed...    Putting on and taking off regular lower body clothing? 3  -ES     Bathing (including washing, rinsing, and drying) 3  -ES     Toileting (which includes using toilet bed pan or urinal) 3  -ES     Putting on and taking off regular upper body clothing 3  -ES     Taking care of personal grooming (such as brushing teeth) 4  -ES     Eating meals 4  -ES     AM-PAC 6 Clicks Score (OT) 20  -ES     Row Name 02/06/23 1355          Functional Assessment    Outcome Measure Options AM-PAC 6 Clicks Daily Activity (OT)  -ES           User Key  (r) = Recorded By, (t) = Taken By, (c) = Cosigned By    Initials Name Provider Type    Jessica El OT Occupational Therapist                  OT Recommendation and Plan  Planned Therapy Interventions (OT): activity tolerance training, BADL retraining, functional balance retraining, IADL retraining, occupation/activity based interventions, passive ROM/stretching, patient/caregiver education/training, ROM/therapeutic exercise, strengthening exercise  Therapy Frequency (OT): 5 times/wk  Plan of Care Review  Plan of Care Reviewed With: patient  Outcome Evaluation: Pt is a 58 yo male seen preoperatively for R rTSA scheduled 2/15/23. PMHx significant for [facility]), R visual deficit, tinnitis, and HLD. At baseline, he is independent, driving, and resides in a multi-level home with his 16 year old son. Patient is an active participant in his mother's care, and also has social support of his  adult daughter. Patient is pleasant and eager to learn post-surgical precautions. OT educated on donning/doffing abductor sling and Cold Therapy, HEP, and ADL modifications during post-surgical recovery period. Patient demos good understanding. Questions regarding IADLs answered. Will follow up following surgery. Goals written for post-surgical care.     Time Calculation:    Time Calculation- OT     Row Name 02/06/23 1355             Time Calculation- OT    OT Start Time 1235  -ES      OT Stop Time 1300  -ES      OT Time Calculation (min) 25 min  -ES      Total Timed Code Minutes- OT 0 minute(s)  -ES      OT Received On 02/06/23  -ES      OT - Next Appointment 02/15/23  -ES            User Key  (r) = Recorded By, (t) = Taken By, (c) = Cosigned By    Initials Name Provider Type    Jessica El OT Occupational Therapist              Therapy Charges for Today     Code Description Service Date Service Provider Modifiers Qty    81723970662 HC OT EVAL MOD COMPLEXITY 4 2/6/2023 Jessica Tinajero OT GO 1               Jessica Tinajero OT  2/6/2023

## 2023-02-06 NOTE — PLAN OF CARE
Goal Outcome Evaluation:  Plan of Care Reviewed With: patient           Outcome Evaluation: Pt is a 56 yo male seen preoperatively for R rTSA scheduled 2/15/23. PMHx significant for L [facility]), R visual deficit, tinnitis, and HLD. At baseline, he is independent, driving, and resides in a multi-level home with his 16 year old son. Patient is an active participant in his mother's care, and also has social support of his adult daughter. Patient is pleasant and eager to learn post-surgical precautions. OT educated on donning/doffing abductor sling and Cold Therapy, HEP, and ADL modifications during post-surgical recovery period. Patient demos good understanding. Questions regarding IADLs answered. Will follow up following surgery. Goals written for post-surgical care.

## 2023-02-07 LAB — QT INTERVAL: 362 MS

## 2023-02-07 NOTE — PAT
"Mr Jerry in for PAT education and testing for joint sugery 2/15/23. Pt refused his MRSA swab due to \"spiritual reasons\".  Pt was educated why we do this test and verbalized understanding of why and how it is performed.  Pt continue to state he rejects test.    "

## 2023-02-13 RX ORDER — PROMETHAZINE HYDROCHLORIDE 12.5 MG/1
12.5 TABLET ORAL EVERY 6 HOURS PRN
Qty: 21 TABLET | Refills: 1 | Status: SHIPPED | OUTPATIENT
Start: 2023-02-13 | End: 2023-03-13

## 2023-02-13 RX ORDER — OXYCODONE HYDROCHLORIDE AND ACETAMINOPHEN 5; 325 MG/1; MG/1
1 TABLET ORAL EVERY 6 HOURS PRN
Qty: 28 TABLET | Refills: 0 | Status: SHIPPED | OUTPATIENT
Start: 2023-02-13 | End: 2023-03-13

## 2023-02-13 RX ORDER — CEPHALEXIN 500 MG/1
500 CAPSULE ORAL 4 TIMES DAILY
Qty: 4 CAPSULE | Refills: 0 | Status: SHIPPED | OUTPATIENT
Start: 2023-02-13 | End: 2023-02-14

## 2023-02-13 NOTE — H&P
[facility]   HISTORY AND PHYSICAL    Patient Name: Maninder Edwards  : 1965  MRN: [id number]  Primary Care Physician:  Deep Yo MD  Date of admission: (Not on file)    Subjective   Subjective     Chief Complaint: Right shoulder pain    This is a 57-year-old gentleman presenting with longstanding shoulder arthritis for total shoulder arthroplasty    History of Present Illness    Review of Systems     Personal History     Past Medical History:   Diagnosis Date   • Degenerative joint disease of shoulder    • GERD (gastroesophageal reflux disease)    • Hypermetropia    • Impotence of organic origin    • Migraine    • Mixed hyperlipidemia    • Presbyopia        Past Surgical History:   Procedure Laterality Date   • JOINT REPLACEMENT     • TOTAL SHOULDER REPLACEMENT Left 2019       Family History: family history includes Heart disease in his father; No Known Problems in his mother. Otherwise pertinent FHx was reviewed and not pertinent to current issue.    Social History:  reports that he quit smoking about 13 years ago. His smoking use included cigarettes. He has a 5.00 pack-year smoking history. He has never used smokeless tobacco. He reports that he does not drink alcohol and does not use drugs.    Home Medications:  amitriptyline, gabapentin, meloxicam, rizatriptan, and topiramate    Allergies:  No Known Allergies    Objective    Objective     Vitals:        Right shoulder has intact skin he has moderate pain over the bicep groove passive elevation 150 abduction 120 external rotation 30 internal rotation the right hip with mild pain but no weakness on belly press and liftoff which are 5/5.  He has mild pain but no weakness on Speed, Ballard, supraspinatus testing  Sensory and motor exam are intact all distributions. Radial pulse is palpable and capillary refill is less than two seconds to all digits.     Imaging:  Previous x-rays demonstrate end-stage degenerative joint disease minimal to  no glenoid abnormality     Assessment:  Right shoulder degenerative joint disease     Plan:  Treatment options discussed he would like to proceed with right total shoulder arthroplasty.  Discussed the low probability of possible reverse total shoulder arthroplasty.  Postop sling dispensed obtain preop MRI  Risks and benefits, specifically risks of bleeding, scar, infection, fracture, stiffness, retear, nerve, tendon or artery damage, the need for further surgery, DVT, and loss of life or limb and rehab were discussed. All questions were answered and addressed.  Greater than 15 minutes was spent demonstrating proper fit and use of the device and signs to monitor for complications  He would like to have surgery done as an outpatient    Hong Herndon MD

## 2023-02-14 ENCOUNTER — ANESTHESIA EVENT (OUTPATIENT)
Dept: PERIOP | Facility: HOSPITAL | Age: 58
End: 2023-02-14
Payer: OTHER GOVERNMENT

## 2023-02-15 ENCOUNTER — ANESTHESIA (OUTPATIENT)
Dept: PERIOP | Facility: HOSPITAL | Age: 58
End: 2023-02-15
Payer: OTHER GOVERNMENT

## 2023-02-15 ENCOUNTER — HOSPITAL ENCOUNTER (OUTPATIENT)
Facility: HOSPITAL | Age: 58
Discharge: HOME | End: 2023-02-15
Attending: ORTHOPAEDIC SURGERY | Admitting: ORTHOPAEDIC SURGERY
Payer: OTHER GOVERNMENT

## 2023-02-15 ENCOUNTER — APPOINTMENT (OUTPATIENT)
Dept: GENERAL RADIOLOGY | Facility: HOSPITAL | Age: 58
End: 2023-02-15
Payer: OTHER GOVERNMENT

## 2023-02-15 VITALS
OXYGEN SATURATION: 100 % | RESPIRATION RATE: 9 BRPM | HEIGHT: 68 IN | SYSTOLIC BLOOD PRESSURE: 127 MMHG | WEIGHT: 129 LBS | BODY MASS INDEX: 19.55 KG/M2 | TEMPERATURE: 97.3 F | HEART RATE: 53 BPM | DIASTOLIC BLOOD PRESSURE: 61 MMHG

## 2023-02-15 DIAGNOSIS — M19.011 OSTEOARTHRITIS OF RIGHT GLENOHUMERAL JOINT: ICD-10-CM

## 2023-02-15 DIAGNOSIS — M19.011 PRIMARY OSTEOARTHRITIS OF RIGHT SHOULDER: Primary | ICD-10-CM

## 2023-02-15 PROCEDURE — C1713 ANCHOR/SCREW BN/BN,TIS/BN: HCPCS | Performed by: ORTHOPAEDIC SURGERY

## 2023-02-15 PROCEDURE — 25010000002 CEFAZOLIN PER 500 MG: Performed by: ORTHOPAEDIC SURGERY

## 2023-02-15 PROCEDURE — 23472 RECONSTRUCT SHOULDER JOINT: CPT | Performed by: ORTHOPAEDIC SURGERY

## 2023-02-15 PROCEDURE — 97168 OT RE-EVAL EST PLAN CARE: CPT

## 2023-02-15 PROCEDURE — 25010000002 SUCCINYLCHOLINE PER 20 MG: Performed by: NURSE ANESTHETIST, CERTIFIED REGISTERED

## 2023-02-15 PROCEDURE — 25010000002 CEFTRIAXONE PER 250 MG: Performed by: ORTHOPAEDIC SURGERY

## 2023-02-15 PROCEDURE — 25010000002 FENTANYL CITRATE (PF) 50 MCG/ML SOLUTION: Performed by: ANESTHESIOLOGY

## 2023-02-15 PROCEDURE — G0378 HOSPITAL OBSERVATION PER HR: HCPCS

## 2023-02-15 PROCEDURE — 25010000002 PHENYLEPHRINE 10 MG/ML SOLUTION 5 ML VIAL: Performed by: NURSE ANESTHETIST, CERTIFIED REGISTERED

## 2023-02-15 PROCEDURE — 25010000002 PROPOFOL 10 MG/ML EMULSION: Performed by: NURSE ANESTHETIST, CERTIFIED REGISTERED

## 2023-02-15 PROCEDURE — 25010000002 VANCOMYCIN 1 G RECONSTITUTED SOLUTION 1 EACH VIAL: Performed by: ORTHOPAEDIC SURGERY

## 2023-02-15 PROCEDURE — 25010000002 ONDANSETRON PER 1 MG: Performed by: NURSE ANESTHETIST, CERTIFIED REGISTERED

## 2023-02-15 PROCEDURE — C9290 INJ, BUPIVACAINE LIPOSOME: HCPCS | Performed by: ANESTHESIOLOGY

## 2023-02-15 PROCEDURE — C1776 JOINT DEVICE (IMPLANTABLE): HCPCS | Performed by: ORTHOPAEDIC SURGERY

## 2023-02-15 PROCEDURE — 97535 SELF CARE MNGMENT TRAINING: CPT

## 2023-02-15 PROCEDURE — 25010000002 DEXAMETHASONE PER 1 MG: Performed by: ANESTHESIOLOGY

## 2023-02-15 PROCEDURE — 0 BUPIVACAINE LIPOSOME 1.3 % SUSPENSION: Performed by: ANESTHESIOLOGY

## 2023-02-15 PROCEDURE — 23472 RECONSTRUCT SHOULDER JOINT: CPT | Performed by: PHYSICIAN ASSISTANT

## 2023-02-15 PROCEDURE — 25010000002 PROPOFOL 1000 MG/100ML EMULSION: Performed by: NURSE ANESTHETIST, CERTIFIED REGISTERED

## 2023-02-15 PROCEDURE — 73030 X-RAY EXAM OF SHOULDER: CPT

## 2023-02-15 PROCEDURE — 25010000002 MIDAZOLAM PER 1 MG: Performed by: ANESTHESIOLOGY

## 2023-02-15 DEVICE — CP SHLDR ES EDGE: Type: IMPLANTABLE DEVICE | Status: FUNCTIONAL

## 2023-02-15 DEVICE — SUT NONABS MAXBRAID/PE NMBR2 HC5 38IN BLU 900334: Type: IMPLANTABLE DEVICE | Site: SHOULDER | Status: FUNCTIONAL

## 2023-02-15 DEVICE — GLEN ANAT ALTIVATE A/POLY PEG E/PLS SZ46: Type: IMPLANTABLE DEVICE | Site: SHOULDER | Status: FUNCTIONAL

## 2023-02-15 DEVICE — NK STEM HUM/SHLDR ALTIVATE ANAT CS EDGE 21MM SZ2: Type: IMPLANTABLE DEVICE | Site: SHOULDER | Status: FUNCTIONAL

## 2023-02-15 DEVICE — IMPLANTABLE DEVICE: Type: IMPLANTABLE DEVICE | Site: SHOULDER | Status: FUNCTIONAL

## 2023-02-15 DEVICE — CMT BONE SIMPLEX/P SPEEDSET 40G: Type: IMPLANTABLE DEVICE | Site: SHOULDER | Status: FUNCTIONAL

## 2023-02-15 RX ORDER — GLYCOPYRROLATE 0.2 MG/ML
INJECTION INTRAMUSCULAR; INTRAVENOUS AS NEEDED
Status: DISCONTINUED | OUTPATIENT
Start: 2023-02-15 | End: 2023-02-15 | Stop reason: SURG

## 2023-02-15 RX ORDER — DEXAMETHASONE SODIUM PHOSPHATE 4 MG/ML
INJECTION, SOLUTION INTRA-ARTICULAR; INTRALESIONAL; INTRAMUSCULAR; INTRAVENOUS; SOFT TISSUE
Status: COMPLETED | OUTPATIENT
Start: 2023-02-15 | End: 2023-02-15

## 2023-02-15 RX ORDER — SODIUM CHLORIDE 0.9 % (FLUSH) 0.9 %
10 SYRINGE (ML) INJECTION EVERY 12 HOURS SCHEDULED
Status: DISCONTINUED | OUTPATIENT
Start: 2023-02-15 | End: 2023-02-15 | Stop reason: HOSPADM

## 2023-02-15 RX ORDER — DEXAMETHASONE SODIUM PHOSPHATE 4 MG/ML
INJECTION, SOLUTION INTRA-ARTICULAR; INTRALESIONAL; INTRAMUSCULAR; INTRAVENOUS; SOFT TISSUE AS NEEDED
Status: DISCONTINUED | OUTPATIENT
Start: 2023-02-15 | End: 2023-02-15 | Stop reason: SURG

## 2023-02-15 RX ORDER — PROCHLORPERAZINE EDISYLATE 5 MG/ML
10 INJECTION INTRAMUSCULAR; INTRAVENOUS ONCE AS NEEDED
Status: DISCONTINUED | OUTPATIENT
Start: 2023-02-15 | End: 2023-02-15 | Stop reason: HOSPADM

## 2023-02-15 RX ORDER — FLUMAZENIL 0.1 MG/ML
0.1 INJECTION INTRAVENOUS AS NEEDED
Status: DISCONTINUED | OUTPATIENT
Start: 2023-02-15 | End: 2023-02-15 | Stop reason: HOSPADM

## 2023-02-15 RX ORDER — ACETAMINOPHEN 650 MG/1
650 SUPPOSITORY RECTAL ONCE AS NEEDED
Status: DISCONTINUED | OUTPATIENT
Start: 2023-02-15 | End: 2023-02-15 | Stop reason: HOSPADM

## 2023-02-15 RX ORDER — ONDANSETRON 2 MG/ML
4 INJECTION INTRAMUSCULAR; INTRAVENOUS ONCE AS NEEDED
Status: DISCONTINUED | OUTPATIENT
Start: 2023-02-15 | End: 2023-02-15 | Stop reason: HOSPADM

## 2023-02-15 RX ORDER — MIDAZOLAM HYDROCHLORIDE 1 MG/ML
INJECTION INTRAMUSCULAR; INTRAVENOUS
Status: COMPLETED | OUTPATIENT
Start: 2023-02-15 | End: 2023-02-15

## 2023-02-15 RX ORDER — ALBUTEROL SULFATE 2.5 MG/3ML
2.5 SOLUTION RESPIRATORY (INHALATION) ONCE AS NEEDED
Status: DISCONTINUED | OUTPATIENT
Start: 2023-02-15 | End: 2023-02-15 | Stop reason: HOSPADM

## 2023-02-15 RX ORDER — KETAMINE HCL IN NACL, ISO-OSM 100MG/10ML
SYRINGE (ML) INJECTION AS NEEDED
Status: DISCONTINUED | OUTPATIENT
Start: 2023-02-15 | End: 2023-02-15 | Stop reason: SURG

## 2023-02-15 RX ORDER — SODIUM CHLORIDE, SODIUM LACTATE, POTASSIUM CHLORIDE, CALCIUM CHLORIDE 600; 310; 30; 20 MG/100ML; MG/100ML; MG/100ML; MG/100ML
9 INJECTION, SOLUTION INTRAVENOUS CONTINUOUS PRN
Status: DISCONTINUED | OUTPATIENT
Start: 2023-02-15 | End: 2023-02-15 | Stop reason: HOSPADM

## 2023-02-15 RX ORDER — LABETALOL HYDROCHLORIDE 5 MG/ML
5 INJECTION, SOLUTION INTRAVENOUS
Status: DISCONTINUED | OUTPATIENT
Start: 2023-02-15 | End: 2023-02-15 | Stop reason: HOSPADM

## 2023-02-15 RX ORDER — BUPIVACAINE HYDROCHLORIDE 5 MG/ML
INJECTION, SOLUTION EPIDURAL; INTRACAUDAL
Status: COMPLETED | OUTPATIENT
Start: 2023-02-15 | End: 2023-02-15

## 2023-02-15 RX ORDER — FENTANYL CITRATE 50 UG/ML
INJECTION, SOLUTION INTRAMUSCULAR; INTRAVENOUS
Status: COMPLETED | OUTPATIENT
Start: 2023-02-15 | End: 2023-02-15

## 2023-02-15 RX ORDER — TRANEXAMIC ACID 10 MG/ML
1000 INJECTION, SOLUTION INTRAVENOUS ONCE
Status: COMPLETED | OUTPATIENT
Start: 2023-02-15 | End: 2023-02-15

## 2023-02-15 RX ORDER — HYDROCODONE BITARTRATE AND ACETAMINOPHEN 7.5; 325 MG/1; MG/1
2 TABLET ORAL EVERY 4 HOURS PRN
Status: DISCONTINUED | OUTPATIENT
Start: 2023-02-15 | End: 2023-02-15 | Stop reason: HOSPADM

## 2023-02-15 RX ORDER — FENTANYL CITRATE 50 UG/ML
25 INJECTION, SOLUTION INTRAMUSCULAR; INTRAVENOUS
Status: DISCONTINUED | OUTPATIENT
Start: 2023-02-15 | End: 2023-02-15 | Stop reason: HOSPADM

## 2023-02-15 RX ORDER — SODIUM CHLORIDE 9 MG/ML
40 INJECTION, SOLUTION INTRAVENOUS AS NEEDED
Status: DISCONTINUED | OUTPATIENT
Start: 2023-02-15 | End: 2023-02-15 | Stop reason: HOSPADM

## 2023-02-15 RX ORDER — MIDAZOLAM HYDROCHLORIDE 1 MG/ML
1 INJECTION INTRAMUSCULAR; INTRAVENOUS
Status: DISCONTINUED | OUTPATIENT
Start: 2023-02-15 | End: 2023-02-15 | Stop reason: HOSPADM

## 2023-02-15 RX ORDER — ACETAMINOPHEN 325 MG/1
650 TABLET ORAL ONCE AS NEEDED
Status: DISCONTINUED | OUTPATIENT
Start: 2023-02-15 | End: 2023-02-15 | Stop reason: HOSPADM

## 2023-02-15 RX ORDER — HYDRALAZINE HYDROCHLORIDE 20 MG/ML
5 INJECTION INTRAMUSCULAR; INTRAVENOUS
Status: DISCONTINUED | OUTPATIENT
Start: 2023-02-15 | End: 2023-02-15 | Stop reason: HOSPADM

## 2023-02-15 RX ORDER — ROCURONIUM BROMIDE 10 MG/ML
INJECTION, SOLUTION INTRAVENOUS AS NEEDED
Status: DISCONTINUED | OUTPATIENT
Start: 2023-02-15 | End: 2023-02-15 | Stop reason: SURG

## 2023-02-15 RX ORDER — SODIUM CHLORIDE 0.9 % (FLUSH) 0.9 %
10 SYRINGE (ML) INJECTION AS NEEDED
Status: DISCONTINUED | OUTPATIENT
Start: 2023-02-15 | End: 2023-02-15 | Stop reason: HOSPADM

## 2023-02-15 RX ORDER — ONDANSETRON 2 MG/ML
INJECTION INTRAMUSCULAR; INTRAVENOUS AS NEEDED
Status: DISCONTINUED | OUTPATIENT
Start: 2023-02-15 | End: 2023-02-15 | Stop reason: SURG

## 2023-02-15 RX ORDER — OXYCODONE HCL 10 MG/1
10 TABLET, FILM COATED, EXTENDED RELEASE ORAL ONCE
Status: COMPLETED | OUTPATIENT
Start: 2023-02-15 | End: 2023-02-15

## 2023-02-15 RX ORDER — MELOXICAM 15 MG/1
15 TABLET ORAL ONCE
Status: COMPLETED | OUTPATIENT
Start: 2023-02-15 | End: 2023-02-15

## 2023-02-15 RX ORDER — PREGABALIN 75 MG/1
150 CAPSULE ORAL ONCE
Status: COMPLETED | OUTPATIENT
Start: 2023-02-15 | End: 2023-02-15

## 2023-02-15 RX ORDER — SUCCINYLCHOLINE CHLORIDE 20 MG/ML
INJECTION INTRAMUSCULAR; INTRAVENOUS AS NEEDED
Status: DISCONTINUED | OUTPATIENT
Start: 2023-02-15 | End: 2023-02-15 | Stop reason: SURG

## 2023-02-15 RX ORDER — NALOXONE HCL 0.4 MG/ML
0.4 VIAL (ML) INJECTION AS NEEDED
Status: DISCONTINUED | OUTPATIENT
Start: 2023-02-15 | End: 2023-02-15 | Stop reason: HOSPADM

## 2023-02-15 RX ORDER — PROPOFOL 10 MG/ML
INJECTION, EMULSION INTRAVENOUS AS NEEDED
Status: DISCONTINUED | OUTPATIENT
Start: 2023-02-15 | End: 2023-02-15 | Stop reason: SURG

## 2023-02-15 RX ORDER — DIPHENHYDRAMINE HYDROCHLORIDE 50 MG/ML
12.5 INJECTION INTRAMUSCULAR; INTRAVENOUS
Status: DISCONTINUED | OUTPATIENT
Start: 2023-02-15 | End: 2023-02-15 | Stop reason: HOSPADM

## 2023-02-15 RX ADMIN — MELOXICAM 15 MG: 15 TABLET ORAL at 06:41

## 2023-02-15 RX ADMIN — ROCURONIUM BROMIDE 10 MG: 50 INJECTION, SOLUTION INTRAVENOUS at 07:35

## 2023-02-15 RX ADMIN — PREGABALIN 150 MG: 75 CAPSULE ORAL at 06:41

## 2023-02-15 RX ADMIN — ONDANSETRON 4 MG: 2 INJECTION INTRAMUSCULAR; INTRAVENOUS at 09:20

## 2023-02-15 RX ADMIN — DEXAMETHASONE SODIUM PHOSPHATE 4 MG: 4 INJECTION, SOLUTION INTRAMUSCULAR; INTRAVENOUS at 07:05

## 2023-02-15 RX ADMIN — PROPOFOL INJECTABLE EMULSION 200 MG: 10 INJECTION, EMULSION INTRAVENOUS at 07:35

## 2023-02-15 RX ADMIN — BUPIVACAINE HYDROCHLORIDE 10 ML: 5 INJECTION, SOLUTION EPIDURAL; INTRACAUDAL; PERINEURAL at 07:05

## 2023-02-15 RX ADMIN — OXYCODONE HYDROCHLORIDE 10 MG: 10 TABLET, FILM COATED, EXTENDED RELEASE ORAL at 06:41

## 2023-02-15 RX ADMIN — GLYCOPYRROLATE 0.2 MG: 0.2 INJECTION INTRAMUSCULAR; INTRAVENOUS at 07:50

## 2023-02-15 RX ADMIN — PROPOFOL INJECTABLE EMULSION 150 MCG/KG/MIN: 10 INJECTION, EMULSION INTRAVENOUS at 07:37

## 2023-02-15 RX ADMIN — TRANEXAMIC ACID 1000 MG: 10 INJECTION, SOLUTION INTRAVENOUS at 07:45

## 2023-02-15 RX ADMIN — SUCCINYLCHOLINE CHLORIDE 100 MG: 20 INJECTION, SOLUTION INTRAMUSCULAR; INTRAVENOUS at 07:35

## 2023-02-15 RX ADMIN — PHENYLEPHRINE HYDROCHLORIDE 0.5 MCG/KG/MIN: 10 INJECTION INTRAVENOUS at 07:45

## 2023-02-15 RX ADMIN — DEXAMETHASONE SODIUM PHOSPHATE 4 MG: 4 INJECTION, SOLUTION INTRA-ARTICULAR; INTRALESIONAL; INTRAMUSCULAR; INTRAVENOUS; SOFT TISSUE at 07:35

## 2023-02-15 RX ADMIN — MIDAZOLAM 2 MG: 1 INJECTION INTRAMUSCULAR; INTRAVENOUS at 07:05

## 2023-02-15 RX ADMIN — FENTANYL CITRATE 100 MCG: 50 INJECTION, SOLUTION INTRAMUSCULAR; INTRAVENOUS at 07:05

## 2023-02-15 RX ADMIN — BUPIVACAINE 10 ML: 13.3 INJECTION, SUSPENSION, LIPOSOMAL INFILTRATION at 07:05

## 2023-02-15 RX ADMIN — SODIUM CHLORIDE, POTASSIUM CHLORIDE, SODIUM LACTATE AND CALCIUM CHLORIDE 9 ML/HR: 600; 310; 30; 20 INJECTION, SOLUTION INTRAVENOUS at 06:31

## 2023-02-15 RX ADMIN — BENZOYL PEROXIDE: 100 LIQUID TOPICAL at 06:32

## 2023-02-15 RX ADMIN — CEFAZOLIN 2 G: 2 INJECTION, POWDER, FOR SOLUTION INTRAMUSCULAR; INTRAVENOUS at 07:36

## 2023-02-15 RX ADMIN — Medication 20 MG: at 07:42

## 2023-02-15 NOTE — OP NOTE
TOTAL SHOULDER ARTHROPLASTY  Procedure Report    Patient Name:  Maninder Edwards  YOB: 1965    Date of Surgery:  2/15/2023     Indications: This is a 57 y.o. male with right shoulder pain.  Imaging demonstrated degenerative joint disease.  Treatment options were discussed.  They desired to proceed with reverse shoulder arthroplasty after discussing the risks including bleeding, scarring, infection, stiffness, nerve damage, tendon damage, artery damage, continued  pain, DVT, loss of life or limb, fracture, dislocation, and a need for further surgery.      Pre-op Diagnosis:   Osteoarthritis of right glenohumeral joint [M19.011]       Post-op Diagnosis:    Post-Op Diagnosis Codes:     * Osteoarthritis of right glenohumeral joint [M19.011]    Procedure/CPT® Codes: 21207     Procedure(s):  TOTAL SHOULDER ARTHROPLASTY    Staff: Calderon Rincon physician assistant    was responsible for performing the following activities: Retraction, Suction, Irrigation, Suturing, Closing and Placing Dressing and their skilled assistance was necessary for the success of this case.       Anesthesia: General with Block    Estimated Blood Loss: 100ml    Implants:    Implant Name Type Inv. Item Serial No.  Lot No. LRB No. Used Action   SUT NONABS MAXBRAID/PE NMBR2 HC5 38IN MICHELLE [id number] - [id number] Implant SUT NONABS MAXBRAID/PE NMBR2 HC5 38IN MICHELLE [id number]  [employer] [id number] Right 3 Implanted   CMT BONE SIMPLEX/P SPEEDSET 40G - [id number] Implant CMT BONE SIMPLEX/P SPEEDSET 40G  [employer] [id number] Right 1 Implanted   NK STEM HUM/SHLDR ALTIVATE LESLI CS EDGE 21MM SZ2 - [id number] Implant NK STEM HUM/SHLDR ALTIVATE LESLI CS EDGE 21MM SZ2  [employer] [id number] Right 1 Implanted   ANNALEE LESLI ALTIVATE A/POLY PEG E/PLS SZ46 - [id number] Implant ANNALEE LESLI ALTIVATE A/POLY PEG E/PLS SZ46  [employer] [id number] Right 1 Implanted   SUT NONABS MAXBRAID/PE NMBR2 HC5 38IN MICHELLE [id number] - [id number] Implant SUT NONABS MAXBRAID/PE  NMBR2 HC5 38IN MICHELLE 196553  [employer] [id number] Right 2 Implanted   HD HUM/SHLDR ALTIVATE LESLI STD/OFFST [id number] - [id number] Implant HD HUM/SHLDR ALTIVATE LESLI STD/OFFST [id number]  [employer] [id number] Right 1 Implanted       IVF: see anesthesia      Complications: None    Specimens:none    Description of Procedure: The patient's operative site was marked in the  preoperative holding area.  They received regional anesthesia and were brought to  the operating room and placed supine on a well-padded operating room table.  General anesthesia was administered.  Antibiotics were dosed.  A timeout was  taken, confirming the correct operative site and procedure.  They were placed in  the beach chair position.  The right shoulder was prepped and draped in the  standard surgical fashion.  SCDs were placed. A deltopectoral incision was marked and injected with local anesthetic.  The skin was opened.  Flaps were raised.  The interval was opened and the cephalic vein was protected.  Adhesions were released from the deltoid.  The circumflex vessels were identified and ligated with suture and cautery.  The rotator interval was opened and a retractor was placed.  The subscapularis was intact and Tenotomized and the capsule was released down to the 6 o'clock position on the  humeral head protecting the axillary nerve.  A Fukuda retractor was placed and an inferior and anterior capsular release was performed palpating and protecting the axillary  nerve with my finger at all times.  The shoulder was dislocated.  The biceps  was tenodesed to the upper biceps groove and the circumferential osteophytes  were removed to identify the native head-neck junction.  Posterior superior cuff was intact and prserved. The cut was made in anatomic   retroversion and the head protector placed.The glenoid was exposed after placing retractors.  The soft tissue was removed circumferentially.  The center point was marked and the glenoid was prepared  in standard fashion cementing the holes.  A cyst was bone grafted in the posterior inferior hole.    The shoulder was dislocated and the protector was removed.  Central bone was not compressible.  Guide pin placed over the head trial with good stability.  Planer used to create the counter sink and then the punch was seated and removed along with the guide wire.  Two drill holes were placed in the humerus to pass suture then the base plate placed with good purchase.  Head trialing demonstrated the 50 x 20 to allow full overhead motion, full internal rotation, 50 percent bounce back and no dislocation at 30 degrees of external rotation.  True head was placed with similar exam.      Subscapularis was repaired with the above sutures as well as closing of the rotator interval.  A 3-minute Betadine wash performed.  The wound was closed in layers with absorbable suture and skin glue.  A sterile dressing and sling were applied.  They were awakened and taken to the recovery room.  There were no complications.  I was present for all portions.  All counts were correct.   Good capillary refill was noted to the hand.      Hong Herndon MD     Date: 2/15/2023  Time: 09:21 EST

## 2023-02-15 NOTE — THERAPY RE-EVALUATION
Patient Name: Maninder Edwards  : 1965    MRN: [id number]                              Today's Date: 2/15/2023       Admit Date: 2/15/2023    Visit Dx:     ICD-10-CM ICD-9-CM   1. Primary osteoarthritis of right shoulder  M19.011 715.11   2. Osteoarthritis of right glenohumeral joint  M19.011 715.91     Patient Active Problem List   Diagnosis   • Degenerative joint disease of shoulder, left   • Mixed hyperlipidemia   • GERD (gastroesophageal reflux disease)   • Impotence of organic origin   • Other headache syndrome   • Drusen (degenerative) of macula, right eye   • Hypermetropia   • Presbyopia of both eyes   • Hypermetropia of both eyes   • Migraine   • DJD of right shoulder     Past Medical History:   Diagnosis Date   • Degenerative joint disease of shoulder    • GERD (gastroesophageal reflux disease)    • Hypermetropia    • Impotence of organic origin    • Migraine    • Mixed hyperlipidemia    • Presbyopia      Past Surgical History:   Procedure Laterality Date   • JOINT REPLACEMENT     • TOTAL SHOULDER REPLACEMENT Left 2019      General Information     Row Name 02/15/23 1546          OT Time and Intention    Document Type re-evaluation  -MP     Mode of Treatment occupational therapy  -MP     Row Name 02/15/23 1546          General Information    Patient Profile Reviewed yes  -MP     Prior Level of Function independent:;ADL's  -MP     Row Name 02/15/23 1546          Living Environment    People in Home child(michael), dependent  -MP     Row Name 02/15/23 1546          Cognition    Orientation Status (Cognition) oriented x 4  -MP     Row Name 02/15/23 1546          Safety Issues, Functional Mobility    Impairments Affecting Function (Mobility) strength;range of motion (ROM)  -MP           User Key  (r) = Recorded By, (t) = Taken By, (c) = Cosigned By    Initials Name Provider Type    MP Arron Hardwick OT Occupational Therapist                 Mobility/ADL's     Row Name 02/15/23 1551          Bed  Mobility    Bed Mobility bed mobility (all) activities  -MP     All Activities, [place] (Bed Mobility) modified independence  -MP     Row Name 02/15/23 1551          Functional Mobility    Functional Mobility- Ind. Level independent  -[facility] Name 02/15/23 1551          Activities of Daily Living    BADL Assessment/Intervention upper body dressing  -[facility] Name 02/15/23 1551          Mobility    Extremity Weight-bearing Status right upper extremity  -MP     Right Upper Extremity (Weight-bearing Status) non weight-bearing (NWB)  -[facility] Name 02/15/23 1551          Upper Body Dressing Assessment/Training    [place] Level (Upper Body Dressing) moderate assist (50% patient effort);don;doff;pull-over garment  -MP           User Key  (r) = Recorded By, (t) = Taken By, (c) = Cosigned By    Initials Name Provider Type    Arron Clemons OT Occupational Therapist               Obj/Interventions     [facility] Name 02/15/23 1553          Range of Motion Comprehensive    Comment, General Range of Motion LUE WFL  -[facility] Name 02/15/23 1553          Strength Comprehensive (MMT)    Comment, General Manual Muscle Testing (MMT) Assessment [facility] WFL  -[facility] Name 02/15/23 1553          Balance    Dynamic Standing Balance independent  -     Balance Interventions sitting;standing;sit to stand;supported;dynamic;static  -           User Key  (r) = Recorded By, (t) = Taken By, (c) = Cosigned By    Initials Name Provider Type    Arron Clemons OT Occupational Therapist               Goals/Plan     Row Name 02/15/23 1601          Bed Mobility Goal 1 (OT)    Activity/Assistive Device (Bed Mobility Goal 1, OT) bed mobility activities, all  -MP     [place] Level/Cues Needed (Bed Mobility Goal 1, OT) standby assist  -MP     Time Frame (Bed Mobility Goal 1, OT) 2 weeks  -     Row Name 02/15/23 1601          Bathing Goal 1 (OT)    Activity/Device (Bathing Goal 1, OT) bathing skills, all  -MP      [place] Level/Cues Needed (Bathing Goal 1, OT) modified independence  -MP     Time Frame (Bathing Goal 1, OT) 2 weeks  -     Row Name 02/15/23 1601          Dressing Goal 1 (OT)    Activity/Device (Dressing Goal 1, OT) dressing skills, all  -MP     [place]/Cues Needed (Dressing Goal 1, OT) supervision required  -MP     Time Frame (Dressing Goal 1, OT) 2 weeks  -MP           User Key  (r) = Recorded By, (t) = Taken By, (c) = Cosigned By    Initials Name Provider Type    MP Arron Hardwick, REGINALDO Occupational Therapist               Clinical Impression     Row Name 02/15/23 2716          Pain Assessment    Pretreatment Pain Rating 0/10 - no pain  -MP     Posttreatment Pain Rating 0/10 - no pain  -MP     [facility] Name 02/15/23 0637          Plan of Care Review    Plan of Care Reviewed With patient  -MP     Progress no change  -MP     Outcome Evaluation Pt. reevaluated POD # 0 RUE rTSA this date. Pt. w c/o numbness secondary to nerve block, requires mod-max A for donning/doffing sling, shirt, and ice this date, daughter present for ADL education. Pt. demonstrates HEP w/ shoulder flexion approx 100*, ER PROM completed to neutral. Pt. educated on shoulder precautions nad mod I ADL techniques. Recommend OP therapy at d/c to advance rTSA protocol, anticipate d/c home today.  -     Row Name 02/15/23 1219          Therapy Assessment/Plan (OT)    Criteria for Skilled Therapeutic Interventions Met (OT) yes;skilled treatment is necessary  -MP     Therapy Frequency (OT) 5 times/wk  -     Row Name 02/15/23 9810          Therapy Plan Review/Discharge Plan (OT)    Anticipated Discharge Disposition (OT) home with outpatient therapy services  -     Row Name 02/15/23 9099          Vital Signs    Pre Patient Position Supine  -MP     Intra Patient Position Standing  -MP     Post Patient Position Sitting  -[facility] Name 02/15/23 0395          Positioning and Restraints    Pre-Treatment Position in bed  -MP     Post  Treatment Position bed  -MP     In Bed sitting EOB  -MP           User Key  (r) = Recorded By, (t) = Taken By, (c) = Cosigned By    Initials Name Provider Type    Arron Clemons OT Occupational Therapist               Outcome Measures    No documentation.                   OT Recommendation and Plan  Therapy Frequency (OT): 5 times/wk  Plan of Care Review  Plan of Care Reviewed With: patient  Progress: no change  Outcome Evaluation: Pt. reevaluated POD # 0 RUE rTSA this date. Pt. w c/o numbness secondary to nerve block, requires mod-max A for donning/doffing sling, shirt, and ice this date, daughter present for ADL education. Pt. demonstrates HEP w/ shoulder flexion approx 100*, ER PROM completed to neutral. Pt. educated on shoulder precautions nad mod I ADL techniques. Recommend OP therapy at d/c to advance rTSA protocol, anticipate d/c home today.     Time Calculation:    Time Calculation- OT     Row Name 02/15/23 1602             Time Calculation- OT    OT Start Time 1137  -MP      OT Stop Time 1205  -MP      OT Time Calculation (min) 28 min  -MP      Total Timed Code Minutes- OT 8 minute(s)  -MP      OT Received On 02/15/23  -MP      OT - Next Appointment 02/16/23  -MP      OT Goal Re-Cert Due Date 03/01/23  -MP            User Key  (r) = Recorded By, (t) = Taken By, (c) = Cosigned By    Initials Name Provider Type    Arron Clemons OT Occupational Therapist              Therapy Charges for Today     Code Description Service Date Service Provider Modifiers Qty    [id number]  OT RE-EVAL 2 2/15/2023 Arron Hardwick OT GO 1    [id number]  OT SELF CARE/MGMT/TRAIN EA 15 MIN 2/15/2023 Arron Hardwick OT GO 1               Arron Hardwick OT  2/15/2023

## 2023-02-15 NOTE — ANESTHESIA PREPROCEDURE EVALUATION
Anesthesia Evaluation     NPO Solid Status: > 6 hours  NPO Liquid Status: > 2 hours           Airway   Mallampati: II  TM distance: >3 FB  Neck ROM: full  Dental - normal exam     Pulmonary - negative pulmonary ROS and normal exam   Cardiovascular - negative cardio ROS and normal exam        Neuro/Psych  (+) headaches,    GI/Hepatic/Renal/Endo - negative ROS     Musculoskeletal     Abdominal  - normal exam   Substance History      OB/GYN          Other   arthritis,                      Anesthesia Plan    ASA 2     general and regional     (ISB for post op analgesia)  intravenous induction     Anesthetic plan, risks, benefits, and alternatives have been provided, discussed and informed consent has been obtained with: patient.    Plan discussed with CRNA and CAA.        CODE STATUS:

## 2023-02-15 NOTE — ANESTHESIA POSTPROCEDURE EVALUATION
Patient: Maninder Edwards    Procedure Summary     Date: 02/15/23 Room / Location: [facility] OR 11 / [facility] MAIN OR    Anesthesia Start: 0726 Anesthesia Stop: 0937    Procedure: TOTAL SHOULDER ARTHROPLASTY (Right: Shoulder) Diagnosis:       Osteoarthritis of right glenohumeral joint      (Osteoarthritis of right glenohumeral joint [M19.011])    Surgeons: Hong Herndon MD Provider: Dedrick Bender MD    Anesthesia Type: general, regional ASA Status: 2          Anesthesia Type: general, regional    Vitals  Vitals Value Taken Time   /71 02/15/23 1022   Temp 97.1 °F (36.2 °C) 02/15/23 1022   Pulse 55 02/15/23 1026   Resp 9 02/15/23 1022   SpO2 100 % 02/15/23 1026   Vitals shown include unvalidated device data.        Post Anesthesia Care and Evaluation    Patient location during evaluation: PHASE II  Patient participation: complete - patient participated  Level of consciousness: awake and alert  Pain management: adequate    Airway patency: patent  Anesthetic complications: No anesthetic complications  PONV Status: none  Cardiovascular status: acceptable  Respiratory status: acceptable  Hydration status: acceptable

## 2023-02-15 NOTE — PLAN OF CARE
Goal Outcome Evaluation:  Plan of Care Reviewed With: patient        Progress: no change  Outcome Evaluation: Pt. reevaluated POD # 0 RUE rTSA this date. Pt. w c/o numbness secondary to nerve block, requires mod-max A for donning/doffing sling, shirt, and ice this date, daughter present for ADL education. Pt. demonstrates HEP w/ shoulder flexion approx 100*, ER PROM completed to neutral. Pt. educated on shoulder precautions nad mod I ADL techniques. Recommend OP therapy at d/c to advance rTSA protocol, anticipate d/c home today.

## 2023-02-15 NOTE — ANESTHESIA PROCEDURE NOTES
Airway  Urgency: elective    Date/Time: 2/15/2023 7:37 AM  Airway not difficult    General Information and Staff    Patient location during procedure: OR  CRNA/CAA: Aaron Milner CRNA    Indications and Patient Condition  Indications for airway management: airway protection    Preoxygenated: yes  MILS maintained throughout  Mask difficulty assessment: 1 - vent by mask    Final Airway Details  Final airway type: endotracheal airway      Successful airway: ETT  Cuffed: yes (mop)   Successful intubation technique: direct laryngoscopy  Facilitating devices/methods: intubating stylet  Endotracheal tube insertion site: oral  Blade: Lind  Blade size: 2  ETT size (mm): 7.0  Cormack-Lehane Classification: grade I - full view of glottis  Placement verified by: chest auscultation and capnometry   Measured from: teeth  ETT/EBT  to teeth (cm): 21  Number of attempts at approach: 1  Assessment: lips, teeth, and gum same as pre-op and atraumatic intubation

## 2023-02-15 NOTE — ANESTHESIA PROCEDURE NOTES
Peripheral Block    Pre-sedation assessment completed: 2/15/2023 7:05 AM    Patient reassessed immediately prior to procedure    Patient location during procedure: pre-op  Start time: 2/15/2023 7:05 AM  Stop time: 2/15/2023 7:10 AM  Reason for block: at surgeon's request and post-op pain management  Performed by  Anesthesiologist: Dedrick Bender MD  Preanesthetic Checklist  Completed: patient identified, IV checked, site marked, risks and benefits discussed, surgical consent, monitors and equipment checked, pre-op evaluation and timeout performed  Prep:  Pt Position: sitting  Prep: ChloraPrep  Patient monitoring: blood pressure monitoring, continuous pulse oximetry and EKG  Procedure    Sedation: yes    Guidance:ultrasound guided  Images:still images obtained, printed/placed on chart    Laterality:right  Block Type:interscalene  Injection Technique:single-shot  Needle Type:echogenic  Needle Gauge:20 G  Resistance on Injection: none  Sedation medications used: midazolam (VERSED) injection - Intravenous   2 mg - 2/15/2023 7:05:00 AM  fentaNYL citrate (PF) (SUBLIMAZE) injection - Intravenous   100 mcg - 2/15/2023 7:05:00 AM  Medications Used: dexamethasone (DECADRON) injection - Injection   4 mg - 2/15/2023 7:05:00 AM  bupivacaine liposome (EXPAREL) injection 1.3% - Injection   10 mL - 2/15/2023 7:05:00 AM  bupivacaine PF (MARCAINE) injection 0.5% - Injection   10 mL - 2/15/2023 7:05:00 AM      Post Assessment  Injection Assessment: negative aspiration for heme, no paresthesia on injection and incremental injection  Patient Tolerance:comfortable throughout block  Complications:no

## 2023-02-16 ENCOUNTER — OFFICE VISIT (OUTPATIENT)
Dept: ORTHOPEDIC SURGERY | Facility: CLINIC | Age: 58
End: 2023-02-16
Payer: OTHER GOVERNMENT

## 2023-02-16 VITALS — HEART RATE: 64 BPM | HEIGHT: 68 IN | BODY MASS INDEX: 19.55 KG/M2 | WEIGHT: 129 LBS

## 2023-02-16 DIAGNOSIS — Z47.89 ORTHOPEDIC AFTERCARE: Primary | ICD-10-CM

## 2023-02-16 PROCEDURE — 99024 POSTOP FOLLOW-UP VISIT: CPT | Performed by: ORTHOPAEDIC SURGERY

## 2023-02-16 NOTE — PROGRESS NOTES
"     Patient ID: Maninder Edwards is a 57 y.o. male.    2/15/23 right total shoulder arthroplasty  Pain controlled    Objective:    Pulse 64   Ht 172.7 cm (68\")   Wt 58.5 kg (129 lb)   BMI 19.61 kg/m²     Physical Examination:  Dry dressing compartments are soft  Sensory and motor exam are intact all distributions. Radial pulse is palpable and capillary refill is less than two seconds to all digits.       Imaging:      Assessment:  Doing well after shoulder arthroplasty    Plan:  Begin therapy continue sling continue dressing see me in 2 weeks with x-rays and wound check  "

## 2023-02-21 ENCOUNTER — TREATMENT (OUTPATIENT)
Dept: PHYSICAL THERAPY | Facility: CLINIC | Age: 58
End: 2023-02-21
Payer: OTHER GOVERNMENT

## 2023-02-21 DIAGNOSIS — M25.511 ACUTE PAIN OF RIGHT SHOULDER: ICD-10-CM

## 2023-02-21 DIAGNOSIS — M25.619 LIMITED RANGE OF MOTION (ROM) OF SHOULDER: ICD-10-CM

## 2023-02-21 DIAGNOSIS — Z96.611 STATUS POST TOTAL REPLACEMENT OF RIGHT SHOULDER: Primary | ICD-10-CM

## 2023-02-21 PROCEDURE — 97161 PT EVAL LOW COMPLEX 20 MIN: CPT | Performed by: PHYSICAL THERAPIST

## 2023-02-21 PROCEDURE — 97140 MANUAL THERAPY 1/> REGIONS: CPT | Performed by: PHYSICAL THERAPIST

## 2023-02-21 NOTE — PROGRESS NOTES
Physical Therapy Initial Evaluation and Plan of Care  [address] [place] [address] [address], [facility] [place] [address]    Patient: Maninder Edwards   : 1965  Diagnosis/ICD-10 Code:  Status post total replacement of right shoulder [Z96.611]  Referring practitioner: Hong Herndon, *  Date of Initial Visit: 2023  Today's Date: 2023  Patient seen for 1 sessions           Subjective Questionnaire: PT Functional Test: Quick DASH 89%      Subjective Evaluation    History of Present Illness  Mechanism of injury: Patient is a 57 year old male s/p R TSA on 2/15 due to history of R shoulder pain and difficulty using his RUE.  He denies any specific injury in the past but states he did receive injections in the past. Patient states his shoulder has been throbbing since surgery but has been taking medication as needed.  He is a very active adult and is hoping to return to exercising.  Patient had a L TSA in 2019.  He is currently wearing an abductor sling.      Pain  Current pain ratin  At worst pain ratin  Location: R shoulder   Quality: dull ache and throbbing  Relieving factors: rest, support and medications  Aggravating factors: sleeping and movement    Hand dominance: right    Patient Goals  Patient goals for therapy: decreased pain, return to sport/leisure activities, increased strength and increased motion             Objective          Active Range of Motion   Left Shoulder   Flexion: 155 degrees   Abduction: 150 degrees   External rotation 45°: 58 degrees   Internal rotation 45°: 65 degrees     Left Elbow   Flexion: WFL  Extension: 0 degrees     Right Elbow   Flexion: WFL  Extension: 0 degrees     Additional Active Range of Motion Details  R shoulder AROM not assessed today per protocol    Passive Range of Motion     Right Shoulder   Flexion: 75 degrees   Abduction: 68 degrees   External rotation 0°: 7 degrees     Additional Passive Range of Motion Details  R internal rotation PROM to abdomen      Strength/Myotome Testing     Left Shoulder     Planes of Motion   Flexion: 5   Abduction: 5   External rotation at 0°: 4+   Internal rotation at 0°: 5     Additional Strength Details  R shoulder not assessed, assess at a later date when appropriate          Assessment & Plan     Assessment  Impairments: abnormal or restricted ROM, activity intolerance, impaired physical strength, lacks appropriate home exercise program and pain with function  Functional Limitations: carrying objects, lifting, sleeping, pushing, uncomfortable because of pain, reaching behind back, reaching overhead and unable to perform repetitive tasks  Assessment details: Patient is a 57 year old male s/p R TSA on 2/15.  Patient presents with decreased R shoulder ROM and strength, inability to use RUE for ADLs at this time including eating, writing, dressing, difficulty with household chores, and disrupted sleep pattern. Patient presents with the impairments listed above and based on the objective findings and the physical therapy evaluation, the patient's condition has the potential to improve in response to therapy.   The patient's condition and/or services required are at a level of complexity that necessitates the skill & supervision of a physical therapist.    Prognosis: good    Goals  Plan Goals: STG:  -Patient will be independent with initial HEP in 3 weeks.  -Patient will demonstrate increased R shoulder external rotation PROM to 35 degrees in 4 weeks.   -Patient will demonstrate improved PROM of R shoulder flexion to 120 degrees and abduction to 90 degrees for progression of protocol in 4 weeks.  -Patient will report a reduction in R shoulder pain by >/=25% for improved sleep pattern in 4 weeks.    LTG:  -Patient will demonstrate increased R shoulder flexion and abduction to WFL for improved ability to reach overhead in 12 weeks.  -Patient will demonstrate increased strength of R shoulder to grossly 4+ to 5/5 for ability to complete  household tasks independently in 12 weeks.  -Patient will be able to return to exercise without complaints of shoulder pain in 12 weeks.  -Patient will report min to no R shoulder pain for improved ability to perform IADLs in 12 weeks.  -Patient will demonstrate increased AROM of R shoulder IR to 70 degrees and ER to 60 degrees for independence with dressing such as pulling up pants and donning jacket in 12 weeks.    Plan  Therapy options: will be seen for skilled therapy services  Planned modality interventions: cryotherapy, thermotherapy (hydrocollator packs), electrical stimulation/Chadian stimulation, ultrasound and dry needling  Planned therapy interventions: manual therapy, postural training, soft tissue mobilization, spinal/joint mobilization, strengthening, stretching, therapeutic activities, joint mobilization, home exercise program, functional ROM exercises, flexibility, body mechanics training and neuromuscular re-education  Frequency: 2x week  Duration in weeks: 12  Treatment plan discussed with: patient        History # of Personal Factors and/or Comorbidities: LOW (0)  Examination of Body System(s): # of elements: MODERATE (3)  Clinical Presentation: STABLE   Clinical Decision Making: LOW     Timed:         Manual Therapy:    10     mins  28326;     Therapeutic Exercise:    5     mins  17572;     Neuromuscular Astrid:        mins  18151;    Therapeutic Activity:          mins  39185;     Gait Training:           mins  13543;     Ultrasound:          mins  99053;    Ionto                                   mins   10546    Un-Timed:  Electrical Stimulation:         mins  65668 ( );  Dry Needling          mins 51737; 20561  Traction          mins 15298  Low Eval     24     Mins  86306  Mod Eval          Mins  51932  High Eval                            Mins  86380      Timed Treatment:   15   mins   Total Treatment:     39   mins    PT SIGNATURE: Paz Milian PT   [place] License # [id number]  Physical  Therapist  Electronically signed by Paz Milian, PT, 02/21/23, 7:56 AM EST      Initial Certification  Certification Period: 2/21/2023 thru 5/21/2023  I certify that the therapy services are furnished while this patient is under my care.  The services outlined above are required by this patient, and will be reviewed every 90 days.     PHYSICIAN: Hong Herndon MD _____________________________________________________________________________________      DATE: __________________________________________    Please sign and return via fax to [phone]. Thank you, [facility].

## 2023-02-24 ENCOUNTER — TREATMENT (OUTPATIENT)
Dept: PHYSICAL THERAPY | Facility: CLINIC | Age: 58
End: 2023-02-24
Payer: OTHER GOVERNMENT

## 2023-02-24 DIAGNOSIS — M25.511 ACUTE PAIN OF RIGHT SHOULDER: ICD-10-CM

## 2023-02-24 DIAGNOSIS — Z96.611 STATUS POST TOTAL REPLACEMENT OF RIGHT SHOULDER: Primary | ICD-10-CM

## 2023-02-24 DIAGNOSIS — M25.619 LIMITED RANGE OF MOTION (ROM) OF SHOULDER: ICD-10-CM

## 2023-02-24 PROCEDURE — 97140 MANUAL THERAPY 1/> REGIONS: CPT | Performed by: PHYSICAL THERAPIST

## 2023-02-24 PROCEDURE — 97110 THERAPEUTIC EXERCISES: CPT | Performed by: PHYSICAL THERAPIST

## 2023-02-24 NOTE — PROGRESS NOTES
Physical Therapy Daily Treatment Note      Patient: Maninder Edwards   : 1965  Diagnosis/ICD-10 Code:  Status post total replacement of right shoulder [Z96.611]  Referring practitioner: Hong Herndon, *  Date of Initial Visit: Type: THERAPY  Noted: 2023  Today's Date: 2023  Patient seen for 2 sessions         Maninder Edwards reports: everything is slowly improving at this time and he state she has followed his protocol well only removing the sling to bathe and stretch his elbow and wrist occassionally. Pt. States so far incision and surgery feel good but there is still quite a bit of soreness.    Objective   See Exercise, Manual, and Modality Logs for complete treatment.     Assessment/Plan  Pt. tolerates treatment well PROM progresses gradually as subtle stretch is provided. Pt. Responds well to AAROM movements as well as those exercises that preserve elbow and wrist ROM. Pt. Should be progressed as tolerated within protocol at this time.    Goals  Plan Goals: STG:  -Patient will be independent with initial HEP in 3 weeks.  -Patient will demonstrate increased R shoulder external rotation PROM to 35 degrees in 4 weeks.   -Patient will demonstrate improved PROM of R shoulder flexion to 120 degrees and abduction to 90 degrees for progression of protocol in 4 weeks.  -Patient will report a reduction in R shoulder pain by >/=25% for improved sleep pattern in 4 weeks.    LTG:  -Patient will demonstrate increased R shoulder flexion and abduction to WFL for improved ability to reach overhead in 12 weeks.  -Patient will demonstrate increased strength of R shoulder to grossly 4+ to 5/5 for ability to complete household tasks independently in 12 weeks.  -Patient will be able to return to exercise without complaints of shoulder pain in 12 weeks.  -Patient will report min to no R shoulder pain for improved ability to perform IADLs in 12 weeks.  -Patient will demonstrate increased AROM of R shoulder  IR to 70 degrees and ER to 60 degrees for independence with dressing such as pulling up pants and donning jacket in 12 weeks.    Progress strengthening /stabilization /functional activity           Timed:         Manual Therapy:    20     mins  77660;     Therapeutic Exercise:    25     mins  49525;         Timed Treatment:   45   mins   Total Treatment:     45   mins    Nydia Figueroa PTA  Physical Therapist Assistant License #[id number]

## 2023-02-28 ENCOUNTER — TREATMENT (OUTPATIENT)
Dept: PHYSICAL THERAPY | Facility: CLINIC | Age: 58
End: 2023-02-28
Payer: OTHER GOVERNMENT

## 2023-02-28 DIAGNOSIS — Z96.611 STATUS POST TOTAL REPLACEMENT OF RIGHT SHOULDER: Primary | ICD-10-CM

## 2023-02-28 DIAGNOSIS — M25.511 ACUTE PAIN OF RIGHT SHOULDER: ICD-10-CM

## 2023-02-28 DIAGNOSIS — M25.619 LIMITED RANGE OF MOTION (ROM) OF SHOULDER: ICD-10-CM

## 2023-02-28 PROCEDURE — 97140 MANUAL THERAPY 1/> REGIONS: CPT | Performed by: PHYSICAL THERAPIST

## 2023-02-28 PROCEDURE — 97110 THERAPEUTIC EXERCISES: CPT | Performed by: PHYSICAL THERAPIST

## 2023-02-28 NOTE — PROGRESS NOTES
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 Pt. States he noticed it wears him out to do much with his R UE and stating he means the stretches and wrist motions he has maintained his protocol.    Objective   See Exercise, Manual, and Modality Logs for complete treatment.     Assessment/Plan   Pt. Tolerates treatment well this date and shows improving shoulder flexion. After each stretch flexion is a bit better. Pt. has good tolerance to gentle overpressure for stretch passively this date and scap mobility is very good as well.    Goals  Plan Goals: STG:  -Patient will be independent with initial HEP in 3 weeks.  -Patient will demonstrate increased R shoulder external rotation PROM to 35 degrees in 4 weeks.   -Patient will demonstrate improved PROM of R shoulder flexion to 120 degrees and abduction to 90 degrees for progression of protocol in 4 weeks.  -Patient will report a reduction in R shoulder pain by >/=25% for improved sleep pattern in 4 weeks.    LTG:  -Patient will demonstrate increased R shoulder flexion and abduction to WFL for improved ability to reach overhead in 12 weeks.  -Patient will demonstrate increased strength of R shoulder to grossly 4+ to 5/5 for ability to complete household tasks independently in 12 weeks.  -Patient will be able to return to exercise without complaints of shoulder pain in 12 weeks.  -Patient will report min to no R shoulder pain for improved ability to perform IADLs in 12 weeks.  -Patient will demonstrate increased AROM of R  shoulder IR to 70 degrees and ER to 60 degrees for independence with dressing such as pulling up pants and donning jacket in 12 weeks.    Progress strengthening /stabilization /functional activity           Timed:         Manual Therapy:    20     mins  92289;     Therapeutic Exercise:    25     mins  23752;       Timed Treatment:   45   mins   Total Treatment:     45   mins    Nydia Figueroa PTA  Physical Therapist Assistant License #[id number]

## 2023-03-02 RX ORDER — AMITRIPTYLINE HYDROCHLORIDE 10 MG/1
TABLET, FILM COATED ORAL
Qty: 180 TABLET | Refills: 3 | Status: SHIPPED | OUTPATIENT
Start: 2023-03-02

## 2023-03-03 ENCOUNTER — TREATMENT (OUTPATIENT)
Dept: PHYSICAL THERAPY | Facility: CLINIC | Age: 58
End: 2023-03-03
Payer: OTHER GOVERNMENT

## 2023-03-03 DIAGNOSIS — M25.619 LIMITED RANGE OF MOTION (ROM) OF SHOULDER: ICD-10-CM

## 2023-03-03 DIAGNOSIS — M25.512 ACUTE PAIN OF LEFT SHOULDER: ICD-10-CM

## 2023-03-03 DIAGNOSIS — Z96.612 STATUS POST REPLACEMENT OF LEFT SHOULDER JOINT: ICD-10-CM

## 2023-03-03 DIAGNOSIS — Z96.611 STATUS POST TOTAL REPLACEMENT OF RIGHT SHOULDER: Primary | ICD-10-CM

## 2023-03-03 DIAGNOSIS — M25.561 PAIN IN BOTH KNEES, UNSPECIFIED CHRONICITY: ICD-10-CM

## 2023-03-03 DIAGNOSIS — M25.511 ACUTE PAIN OF RIGHT SHOULDER: ICD-10-CM

## 2023-03-03 DIAGNOSIS — M25.562 PAIN IN BOTH KNEES, UNSPECIFIED CHRONICITY: ICD-10-CM

## 2023-03-03 PROCEDURE — 97140 MANUAL THERAPY 1/> REGIONS: CPT | Performed by: PHYSICAL THERAPIST

## 2023-03-03 PROCEDURE — 97110 THERAPEUTIC EXERCISES: CPT | Performed by: PHYSICAL THERAPIST

## 2023-03-03 PROCEDURE — 97112 NEUROMUSCULAR REEDUCATION: CPT | Performed by: PHYSICAL THERAPIST

## 2023-03-03 NOTE — PROGRESS NOTES
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     Objective   See Exercise, Manual, and Modality Logs for complete treatment.     Assessment/Plan  Pt. Shows good return of shoulder stretch but motion is still very limited and takes time to relax and allow for gentle overpressure this date.    Goals  Plan Goals: STG:  -Patient will be independent with initial HEP in 3 weeks.  -Patient will demonstrate increased R shoulder external rotation PROM to 35 degrees in 4 weeks.   -Patient will demonstrate improved PROM of R shoulder flexion to 120 degrees and abduction to 90 degrees for progression of protocol in 4 weeks.  -Patient will report a reduction in R shoulder pain by >/=25% for improved sleep pattern in 4 weeks.    LTG:  -Patient will demonstrate increased R shoulder flexion and abduction to WFL for improved ability to reach overhead in 12 weeks.  -Patient will demonstrate increased strength of R shoulder to grossly 4+ to 5/5 for ability to complete household tasks independently in 12 weeks.  -Patient will be able to return to exercise without complaints of shoulder pain in 12 weeks.  -Patient will report min to no R shoulder pain for improved ability to perform IADLs in 12 weeks.  -Patient will demonstrate increased AROM of R shoulder IR to 70 degrees and ER to 60 degrees for independence with dressing such as pulling up pants and donning jacket in 12 weeks.    Progress strengthening /stabilization /functional activity           Timed:         Manual Therapy:    20     mins  89901;     Therapeutic Exercise:     15     mins  44944;     Neuromuscular Astrid:   10     mins  22289;        Timed Treatment:   45   mins   Total Treatment:     45   mins    Nydia Figueroa PTA  Physical Therapist Assistant License #[id number]

## 2023-03-10 ENCOUNTER — TREATMENT (OUTPATIENT)
Dept: PHYSICAL THERAPY | Facility: CLINIC | Age: 58
End: 2023-03-10
Payer: OTHER GOVERNMENT

## 2023-03-10 DIAGNOSIS — M25.619 LIMITED RANGE OF MOTION (ROM) OF SHOULDER: ICD-10-CM

## 2023-03-10 DIAGNOSIS — Z96.611 STATUS POST TOTAL REPLACEMENT OF RIGHT SHOULDER: Primary | ICD-10-CM

## 2023-03-10 DIAGNOSIS — M25.511 ACUTE PAIN OF RIGHT SHOULDER: ICD-10-CM

## 2023-03-10 PROCEDURE — 97110 THERAPEUTIC EXERCISES: CPT | Performed by: PHYSICAL THERAPIST

## 2023-03-10 PROCEDURE — 97530 THERAPEUTIC ACTIVITIES: CPT | Performed by: PHYSICAL THERAPIST

## 2023-03-10 PROCEDURE — 97140 MANUAL THERAPY 1/> REGIONS: CPT | Performed by: PHYSICAL THERAPIST

## 2023-03-10 NOTE — PROGRESS NOTES
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]   Problems Addressed this Visit    None  Visit Diagnoses     Status post total replacement of right shoulder    -  Primary    Acute pain of right shoulder        Limited range of motion (ROM) of shoulder          Diagnoses       Codes Comments    Status post total replacement of right shoulder    -  Primary ICD-10-CM: Z96.611  ICD-9-CM: V43.61     Acute pain of right shoulder     ICD-10-CM: M25.511  ICD-9-CM: 719.41     Limited range of motion (ROM) of shoulder     ICD-10-CM: M25.619  ICD-9-CM: 719.51          Referring practitioner: Hong Herndon, *  Date of Initial Visit: Type: THERAPY  Noted: 2023  Today's Date: 3/10/2023    VISIT#: 5    Subjective Patient is 3.5 weeks post op and reports pain level is steadily improving.  He has a follow up with MD on Monday, 3/13.        Objective     See Exercise, Manual, and Modality Logs for complete treatment.     Assessment/Plan Progressed distal extremity strengthening with addition of tricep pressdown and bicep curls with no increase in shoulder pain reported.  Patient's ROM is steadily improving at this time with flexion PROM to approximately 100 degrees and abduction to 90 degrees per protocol.  Continue to progress R shoulder PROM and AAROM and progress to AROM and strengthening per protocol.    STG:  -Patient will be independent with initial HEP in 3 weeks.  -Patient will demonstrate increased R shoulder external rotation PROM to 35 degrees in 4 weeks.   -Patient will demonstrate improved PROM of R shoulder flexion to 120 degrees and abduction to 90 degrees for progression of protocol in 4 weeks.  -Patient will report a reduction in R shoulder pain by >/=25% for improved sleep pattern in 4 weeks.    LTG:  -Patient will demonstrate  increased R shoulder flexion and abduction to WFL for improved ability to reach overhead in 12 weeks.  -Patient will demonstrate increased strength of R shoulder to grossly 4+ to 5/5 for ability to complete household tasks independently in 12 weeks.  -Patient will be able to return to exercise without complaints of shoulder pain in 12 weeks.  -Patient will report min to no R shoulder pain for improved ability to perform IADLs in 12 weeks.  -Patient will demonstrate increased AROM of R shoulder IR to 70 degrees and ER to 60 degrees for independence with dressing such as pulling up pants and donning jacket in 12 weeks    Progress per Plan of Care and Progress strengthening /stabilization /functional activity         Timed:         Manual Therapy:    15     mins  73269;     Therapeutic Exercise:    15     mins  53898;     Neuromuscular Atsrid:        mins  89057;    Therapeutic Activity:     12     mins  73953;     Gait Training:           mins  82026;     Ultrasound:          mins  20673;    Ionto                                   mins   04269    Un-Timed:  Electrical Stimulation:         mins  14538 ( );  Dry Needling          mins self-pay 20560; 20561  Traction          mins 36374      Timed Treatment:   42   mins   Total Treatment:     42   mins    Paz Milian PT  [place] License # [id number]  Physical Therapist

## 2023-03-13 ENCOUNTER — OFFICE VISIT (OUTPATIENT)
Dept: ORTHOPEDIC SURGERY | Facility: CLINIC | Age: 58
End: 2023-03-13
Payer: OTHER GOVERNMENT

## 2023-03-13 VITALS — BODY MASS INDEX: 19.55 KG/M2 | WEIGHT: 129 LBS | HEART RATE: 90 BPM | HEIGHT: 68 IN

## 2023-03-13 DIAGNOSIS — Z47.89 ORTHOPEDIC AFTERCARE: Primary | ICD-10-CM

## 2023-03-13 PROCEDURE — 99024 POSTOP FOLLOW-UP VISIT: CPT | Performed by: ORTHOPAEDIC SURGERY

## 2023-03-13 NOTE — PROGRESS NOTES
"     Patient ID: Maninder Edwards is a 57 y.o. male.  2/15/23 right total shoulder arthroplasty  Pain controlled      Objective:    Pulse 90   Ht 172.7 cm (68\")   Wt 58.5 kg (129 lb)   BMI 19.61 kg/m²     Physical Examination:  Incision nearly healed there is a small suture tag removed from the upper aspect with no sign of infection  Sensory and motor exam are intact all distributions. Radial pulse is palpable and capillary refill is less than two seconds to all digits.       Imaging:  right Shoulder X-Ray  Indication: Postop total shoulder  AP Y and Lateral views  Findings: Total shoulder in position  no bony lesion  Soft tissues normal  not examined joint spaces  Hardware appropriately positioned yes      yes prior studies available for comparison.    Assessment:  Doing well after shoulder arthroplasty    Plan:  Continue sling and therapy local wound care observe for signs of infection see me in 4-week  "

## 2023-03-15 ENCOUNTER — TREATMENT (OUTPATIENT)
Dept: PHYSICAL THERAPY | Facility: CLINIC | Age: 58
End: 2023-03-15
Payer: OTHER GOVERNMENT

## 2023-03-15 DIAGNOSIS — M25.619 LIMITED RANGE OF MOTION (ROM) OF SHOULDER: ICD-10-CM

## 2023-03-15 DIAGNOSIS — M25.511 ACUTE PAIN OF RIGHT SHOULDER: ICD-10-CM

## 2023-03-15 DIAGNOSIS — Z96.611 STATUS POST TOTAL REPLACEMENT OF RIGHT SHOULDER: Primary | ICD-10-CM

## 2023-03-15 PROCEDURE — 97530 THERAPEUTIC ACTIVITIES: CPT | Performed by: PHYSICAL THERAPIST

## 2023-03-15 PROCEDURE — 97110 THERAPEUTIC EXERCISES: CPT | Performed by: PHYSICAL THERAPIST

## 2023-03-15 PROCEDURE — 97140 MANUAL THERAPY 1/> REGIONS: CPT | Performed by: PHYSICAL THERAPIST

## 2023-03-15 NOTE — PROGRESS NOTES
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 Pt states he was told he would have to remain in sling for an extended time per MD follow up.     Objective   See Exercise, Manual, and Modality Logs for complete treatment.     Assessment/Plan   Pt. ROM is improving each visit but in small increments. Pt. Shows good form and uses good discretion with stretching into overpressure.    STG:  -Patient will be independent with initial HEP in 3 weeks.  -Patient will demonstrate increased R shoulder external rotation PROM to 35 degrees in 4 weeks.   -Patient will demonstrate improved PROM of R shoulder flexion to 120 degrees and abduction to 90 degrees for progression of protocol in 4 weeks.  -Patient will report a reduction in R shoulder pain by >/=25% for improved sleep pattern in 4 weeks.    LTG:  -Patient will demonstrate increased R shoulder flexion and abduction to WFL for improved ability to reach overhead in 12 weeks.  -Patient will demonstrate increased strength of R shoulder to grossly 4+ to 5/5 for ability to complete household tasks independently in 12 weeks.  -Patient will be able to return to exercise without complaints of shoulder pain in 12 weeks.  -Patient will report min to no R shoulder pain for improved ability to perform IADLs in 12 weeks.  -Patient will demonstrate increased AROM of R shoulder IR to 70 degrees and ER to 60 degrees for independence with dressing such as pulling up pants and donning jacket in 12 weeks    Progress strengthening /stabilization /functional activity           Timed:         Manual  Therapy:    15     mins  16784;     Therapeutic Exercise:    15     mins  39402;     Therapeutic Activity:     10     mins  06671;       Timed Treatment:   40   mins   Total Treatment:     40   mins    Nydia Figueroa PTA  Physical Therapist Assistant License #[id number]

## 2023-03-17 ENCOUNTER — TREATMENT (OUTPATIENT)
Dept: PHYSICAL THERAPY | Facility: CLINIC | Age: 58
End: 2023-03-17
Payer: OTHER GOVERNMENT

## 2023-03-17 DIAGNOSIS — Z96.611 STATUS POST TOTAL REPLACEMENT OF RIGHT SHOULDER: Primary | ICD-10-CM

## 2023-03-17 DIAGNOSIS — M25.511 ACUTE PAIN OF RIGHT SHOULDER: ICD-10-CM

## 2023-03-17 DIAGNOSIS — M25.619 LIMITED RANGE OF MOTION (ROM) OF SHOULDER: ICD-10-CM

## 2023-03-17 PROCEDURE — 97112 NEUROMUSCULAR REEDUCATION: CPT | Performed by: PHYSICAL THERAPIST

## 2023-03-17 PROCEDURE — 97140 MANUAL THERAPY 1/> REGIONS: CPT | Performed by: PHYSICAL THERAPIST

## 2023-03-17 PROCEDURE — 97110 THERAPEUTIC EXERCISES: CPT | Performed by: PHYSICAL THERAPIST

## 2023-03-17 NOTE — PROGRESS NOTES
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]   Problems Addressed this Visit    None  Visit Diagnoses     Status post total replacement of right shoulder    -  Primary    Acute pain of right shoulder        Limited range of motion (ROM) of shoulder          Diagnoses       Codes Comments    Status post total replacement of right shoulder    -  Primary ICD-10-CM: Z96.611  ICD-9-CM: V43.61     Acute pain of right shoulder     ICD-10-CM: M25.511  ICD-9-CM: 719.41     Limited range of motion (ROM) of shoulder     ICD-10-CM: M25.619  ICD-9-CM: 719.51          Referring practitioner: Hong Herndon, *  Date of Initial Visit: Type: THERAPY  Noted: 2023  Today's Date: 3/17/2023    VISIT#: 7    Subjective  Patient reports his HEP is going well.  He was hopeful he would be able to discontinue the sling after his last MD follow up but was advised to remain in sling for the next several weeks.      Objective     See Exercise, Manual, and Modality Logs for complete treatment.     Assessment/Plan Patient continues to demonstrate limitations in R shoulder PROM in all planes with firm end feel noted. Patient was able to progress to wall slides but did note increased discomfort into R shoulder when at end range.  Continue to progress strengthening, mobility, and functional activity as tolerated.    STG:  -Patient will be independent with initial HEP in 3 weeks.  -Patient will demonstrate increased R shoulder external rotation PROM to 35 degrees in 4 weeks.   -Patient will demonstrate improved PROM of R shoulder flexion to 120 degrees and abduction to 90 degrees for progression of protocol in 4 weeks.  -Patient will report a reduction in R shoulder pain by >/=25% for improved sleep pattern in 4 weeks.    LTG:  -Patient will demonstrate increased R  shoulder flexion and abduction to WFL for improved ability to reach overhead in 12 weeks.  -Patient will demonstrate increased strength of R shoulder to grossly 4+ to 5/5 for ability to complete household tasks independently in 12 weeks.  -Patient will be able to return to exercise without complaints of shoulder pain in 12 weeks.  -Patient will report min to no R shoulder pain for improved ability to perform IADLs in 12 weeks.  -Patient will demonstrate increased AROM of R shoulder IR to 70 degrees and ER to 60 degrees for independence with dressing such as pulling up pants and donning jacket in 12 weeks     Progress per Plan of Care and Progress strengthening /stabilization /functional activity         Timed:         Manual Therapy:    20     mins  50472;     Therapeutic Exercise:    15     mins  90426;     Neuromuscular Astrid:    16    mins  57103;    Therapeutic Activity:          mins  18877;     Gait Training:           mins  39974;     Ultrasound:          mins  44389;    Ionto                                   mins   13083    Un-Timed:  Electrical Stimulation:         mins  72810 ( );  Dry Needling          mins self-pay 20560; 20561  Traction          mins 13207      Timed Treatment:   51   mins   Total Treatment:     51   mins    Paz Milian PT  [place] License # [id number]  Physical Therapist

## 2023-03-22 ENCOUNTER — TREATMENT (OUTPATIENT)
Dept: PHYSICAL THERAPY | Facility: CLINIC | Age: 58
End: 2023-03-22
Payer: OTHER GOVERNMENT

## 2023-03-22 DIAGNOSIS — M25.619 LIMITED RANGE OF MOTION (ROM) OF SHOULDER: ICD-10-CM

## 2023-03-22 DIAGNOSIS — M25.511 ACUTE PAIN OF RIGHT SHOULDER: ICD-10-CM

## 2023-03-22 DIAGNOSIS — Z96.611 STATUS POST TOTAL REPLACEMENT OF RIGHT SHOULDER: Primary | ICD-10-CM

## 2023-03-22 PROCEDURE — 97112 NEUROMUSCULAR REEDUCATION: CPT | Performed by: PHYSICAL THERAPIST

## 2023-03-22 PROCEDURE — 97140 MANUAL THERAPY 1/> REGIONS: CPT | Performed by: PHYSICAL THERAPIST

## 2023-03-22 PROCEDURE — 97110 THERAPEUTIC EXERCISES: CPT | Performed by: PHYSICAL THERAPIST

## 2023-03-22 NOTE — PROGRESS NOTES
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    Objective   See Exercise, Manual, and Modality Logs for complete treatment.     Assessment/Plan   Pt. Tolerates treatment well this date. Pt. Was able to relax better for stretch but has some scapular protrusion when flexion is performed passively. Scapular mobs and stretches were provided and flexion improved. Patient denies the need for heat or ice today.    STG:  -Patient will be independent with initial HEP in 3 weeks.  -Patient will demonstrate increased R shoulder external rotation PROM to 35 degrees in 4 weeks.   -Patient will demonstrate improved PROM of R shoulder flexion to 120 degrees and abduction to 90 degrees for progression of protocol in 4 weeks.  -Patient will report a reduction in R shoulder pain by >/=25% for improved sleep pattern in 4 weeks.    LTG:  -Patient will demonstrate increased R shoulder flexion and abduction to WFL for improved ability to reach overhead in 12 weeks.  -Patient will demonstrate increased strength of R shoulder to grossly 4+ to 5/5 for ability to complete household tasks independently in 12 weeks.  -Patient will be able to return to exercise without complaints of shoulder pain in 12 weeks.  -Patient will report min to no R shoulder pain for improved ability to perform IADLs in 12 weeks.  -Patient will demonstrate increased AROM of R shoulder IR to 70 degrees and ER to 60 degrees for independence with dressing such as pulling up pants and donning jacket in 12  weeks    Progress strengthening /stabilization /functional activity           Timed:         Manual Therapy:    20     mins  29053;     Therapeutic Exercise:    15     mins  45505;     Neuromuscular Astrid:    10    mins  00925;        Timed Treatment:   45   mins   Total Treatment:     45   mins    Nydai Figueroa PTA  Physical Therapist Assistant License #[id number]

## 2023-03-29 ENCOUNTER — TREATMENT (OUTPATIENT)
Dept: PHYSICAL THERAPY | Facility: CLINIC | Age: 58
End: 2023-03-29
Payer: OTHER GOVERNMENT

## 2023-03-29 DIAGNOSIS — Z96.611 STATUS POST TOTAL REPLACEMENT OF RIGHT SHOULDER: Primary | ICD-10-CM

## 2023-03-29 DIAGNOSIS — M25.619 LIMITED RANGE OF MOTION (ROM) OF SHOULDER: ICD-10-CM

## 2023-03-29 DIAGNOSIS — M25.511 ACUTE PAIN OF RIGHT SHOULDER: ICD-10-CM

## 2023-03-29 PROCEDURE — 97110 THERAPEUTIC EXERCISES: CPT | Performed by: PHYSICAL THERAPIST

## 2023-03-29 PROCEDURE — 97112 NEUROMUSCULAR REEDUCATION: CPT | Performed by: PHYSICAL THERAPIST

## 2023-03-29 PROCEDURE — 97140 MANUAL THERAPY 1/> REGIONS: CPT | Performed by: PHYSICAL THERAPIST

## 2023-03-29 NOTE — PROGRESS NOTES
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    Objective   See Exercise, Manual, and Modality Logs for complete treatment.     Assessment/Plan   Pt. Is responding well to therapy at this time. Return of motion has been slow at this time but consistent and steady. Pt. Was encouraged to start weaning from his sling at this time.    STG:  -Patient will be independent with initial HEP in 3 weeks.  -Patient will demonstrate increased R shoulder external rotation PROM to 35 degrees in 4 weeks.   -Patient will demonstrate improved PROM of R shoulder flexion to 120 degrees and abduction to 90 degrees for progression of protocol in 4 weeks.  -Patient will report a reduction in R shoulder pain by >/=25% for improved sleep pattern in 4 weeks.    LTG:  -Patient will demonstrate increased R shoulder flexion and abduction to WFL for improved ability to reach overhead in 12 weeks.  -Patient will demonstrate increased strength of R shoulder to grossly 4+ to 5/5 for ability to complete household tasks independently in 12 weeks.  -Patient will be able to return to exercise without complaints of shoulder pain in 12 weeks.  -Patient will report min to no R shoulder pain for improved ability to perform IADLs in 12 weeks.  -Patient will demonstrate increased AROM of R shoulder IR to 70 degrees and ER to 60 degrees for independence with dressing such as pulling up pants and donning jacket in 12 weeks    Progress strengthening  /stabilization /functional activity           Timed:         Manual Therapy:    20     mins  94559;     Therapeutic Exercise:    15     mins  43302;     Neuromuscular Astrid:  10      mins  58434;        Timed Treatment:   45   mins   Total Treatment:     45   mins    Nydia Figueroa PTA  Physical Therapist Assistant License #[id number]

## 2023-04-04 ENCOUNTER — TREATMENT (OUTPATIENT)
Dept: PHYSICAL THERAPY | Facility: CLINIC | Age: 58
End: 2023-04-04
Payer: OTHER GOVERNMENT

## 2023-04-04 DIAGNOSIS — M25.619 LIMITED RANGE OF MOTION (ROM) OF SHOULDER: ICD-10-CM

## 2023-04-04 DIAGNOSIS — M25.511 ACUTE PAIN OF RIGHT SHOULDER: ICD-10-CM

## 2023-04-04 DIAGNOSIS — Z96.611 STATUS POST TOTAL REPLACEMENT OF RIGHT SHOULDER: Primary | ICD-10-CM

## 2023-04-04 PROCEDURE — 97530 THERAPEUTIC ACTIVITIES: CPT | Performed by: PHYSICAL THERAPIST

## 2023-04-04 PROCEDURE — 97140 MANUAL THERAPY 1/> REGIONS: CPT | Performed by: PHYSICAL THERAPIST

## 2023-04-04 NOTE — PROGRESS NOTES
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]   Problems Addressed this Visit    None  Visit Diagnoses     Status post total replacement of right shoulder    -  Primary    Acute pain of right shoulder        Limited range of motion (ROM) of shoulder          Diagnoses       Codes Comments    Status post total replacement of right shoulder    -  Primary ICD-10-CM: Z96.611  ICD-9-CM: V43.61     Acute pain of right shoulder     ICD-10-CM: M25.511  ICD-9-CM: 719.41     Limited range of motion (ROM) of shoulder     ICD-10-CM: M25.619  ICD-9-CM: 719.51          Referring practitioner: Hong Herndon, *  Date of Initial Visit: Type: THERAPY  Noted: 2023  Today's Date: 2023    VISIT#: 10    Subjective Patient reports his shoulder feels pretty good today.  He is still wearing his sling at this time.  Next MD follow up is in ~3 weeks.      Objective     See Exercise, Manual, and Modality Logs for complete treatment.     Assessment/Plan Patient tolerated progression of strengthening and ROM exercises with intermittent rest breaks due to discomfort with stretching.  Patient with increased AAROM noted this date with flexion and scaption compared to last visit.  Added distal extremity strengthening with no complaints reported at shoulder.    Progress per Plan of Care and Progress strengthening /stabilization /functional activity         Timed:         Manual Therapy:    20     mins  92636;     Therapeutic Exercise:         mins  49490;     Neuromuscular Astrid:        mins  41836;    Therapeutic Activity:     15     mins  00374;     Gait Training:           mins  91888;     Ultrasound:          mins  36569;    Ionto                                   mins   97806    Un-Timed:  Electrical Stimulation:         mins  74765 ( );  Dry Needling           mins self-pay 20560; 20561  [facility]          mins 65724      Timed Treatment:   35   mins   Total Treatment:     35   mins    Paz Milian PT  [place] License # [id number]  Physical Therapist

## 2023-04-11 ENCOUNTER — TREATMENT (OUTPATIENT)
Dept: PHYSICAL THERAPY | Facility: CLINIC | Age: 58
End: 2023-04-11
Payer: OTHER GOVERNMENT

## 2023-04-11 DIAGNOSIS — Z96.611 STATUS POST TOTAL REPLACEMENT OF RIGHT SHOULDER: Primary | ICD-10-CM

## 2023-04-11 DIAGNOSIS — M25.619 LIMITED RANGE OF MOTION (ROM) OF SHOULDER: ICD-10-CM

## 2023-04-11 DIAGNOSIS — M25.511 ACUTE PAIN OF RIGHT SHOULDER: ICD-10-CM

## 2023-04-11 PROCEDURE — 97140 MANUAL THERAPY 1/> REGIONS: CPT | Performed by: PHYSICAL THERAPIST

## 2023-04-11 PROCEDURE — 97110 THERAPEUTIC EXERCISES: CPT | Performed by: PHYSICAL THERAPIST

## 2023-04-11 PROCEDURE — 97112 NEUROMUSCULAR REEDUCATION: CPT | Performed by: PHYSICAL THERAPIST

## 2023-04-11 NOTE — PROGRESS NOTES
Physical Therapy Progress Note/Reassessment  [address] [place] [address] Dr. 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 Status post total replacement of right shoulder  Z96.611 V43.61   2. Acute pain of right shoulder  M25.511 719.41   3. Limited range of motion (ROM) of shoulder  M25.619 719.51         Maninder Edwards reports his shoulder continues to have pain and be limited in motion but states he has improved quite a lot since removing sling. Pt. States it isn't sore too much at rest only when he is pushing it for a stretch.  Subjective Questionnaire: QuickDASH: 68%  Clinical Progress: improved  Home Program Compliance: Yes  Treatment has included: therapeutic exercise, neuromuscular re-education, manual therapy and therapeutic activity    Objective     AAROM w/ dowel Flex 0-140 deg Abd 0-85 deg ER 0-40 deg  MMT Flex 4/5 Ext 4+/5 Abd 3+/5 ER 3/5 IR 3/5    Assessment/Plan  Pt. Is greatly benefiting from PROM stretches and exercise for improved ROM at this time however active strength is going slow. Range is very limited in abduction at this time due to tight axillary muscles as well as pain experienced at AC region with passive and active range into abduction. Pt. Is progressing slowly and steadily and will benefit form continued PT for addressing of remaining goals and return to PLOF.    STG:  -Patient will be independent with initial HEP in 3 weeks. MET  -Patient will demonstrate increased R shoulder external rotation PROM to 35 degrees in 4 weeks. MET  -Patient will demonstrate improved PROM of R shoulder flexion to 120 degrees and abduction to 90 degrees for progression of protocol in 4 weeks. Partially Flex  140 deg abd 85 deg  -Patient will report a reduction in R shoulder pain by >/=25% for improved sleep pattern in 4 weeks. MET    LTG:  -Patient will demonstrate increased R shoulder flexion and abduction to WFL for improved ability to reach overhead in 12 weeks. NOT MET  -Patient will demonstrate increased strength of R shoulder to grossly 4+ to 5/5 for ability to complete household tasks independently in 12 weeks. NOT MET  -Patient will be able to return to exercise without complaints of shoulder pain in 12 weeks. Progressing  -Patient will report min to no R shoulder pain for improved ability to perform IADLs in 12 weeks. Progressing  -Patient will demonstrate increased AROM of R shoulder IR to 70 degrees and ER to 60 degrees for independence with dressing such as pulling up pants and donning jacket in 12 weeks NOT MET     Recommendations: Continue with recommendations .  Timeframe: 3 months  Prognosis to achieve goals: good      Timed:         Manual Therapy:   15     mins  07586;     Therapeutic Exercise:    15     mins  91297;     Neuromuscular Astrid:  10      mins  50183;        Timed Treatment:   40   mins   Total Treatment:     40   mins    PT Signature: Nydia Figueroa PTA  [place] License: #[id number]

## 2023-04-21 ENCOUNTER — TELEPHONE (OUTPATIENT)
Dept: ORTHOPEDIC SURGERY | Facility: CLINIC | Age: 58
End: 2023-04-21
Payer: OTHER GOVERNMENT

## 2023-04-21 DIAGNOSIS — M19.011 OSTEOARTHRITIS OF RIGHT GLENOHUMERAL JOINT: Primary | ICD-10-CM

## 2023-04-21 NOTE — TELEPHONE ENCOUNTER
Caller: Maninder Edwards    Relationship: Self    Best call back number:     What is the best time to reach you: ANY    Who are you requesting to speak with (clinical staff, provider,  specific staff member): CLINICAL    What was the call regarding: PATIENT NEEDS UPDATED PHYSICAL THERAPY ORDERS SENT TO SAME PLACE IN [place]     Do you require a callback: YES

## 2023-04-25 ENCOUNTER — TREATMENT (OUTPATIENT)
Dept: PHYSICAL THERAPY | Facility: CLINIC | Age: 58
End: 2023-04-25
Payer: OTHER GOVERNMENT

## 2023-04-25 DIAGNOSIS — M25.619 LIMITED RANGE OF MOTION (ROM) OF SHOULDER: ICD-10-CM

## 2023-04-25 DIAGNOSIS — M25.511 ACUTE PAIN OF RIGHT SHOULDER: ICD-10-CM

## 2023-04-25 DIAGNOSIS — Z96.611 STATUS POST TOTAL REPLACEMENT OF RIGHT SHOULDER: Primary | ICD-10-CM

## 2023-04-25 NOTE — PROGRESS NOTES
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]   Problems Addressed this Visit    None  Visit Diagnoses     Status post total replacement of right shoulder    -  Primary    Acute pain of right shoulder        Limited range of motion (ROM) of shoulder          Diagnoses       Codes Comments    Status post total replacement of right shoulder    -  Primary ICD-10-CM: Z96.611  ICD-9-CM: V43.61     Acute pain of right shoulder     ICD-10-CM: M25.511  ICD-9-CM: 719.41     Limited range of motion (ROM) of shoulder     ICD-10-CM: M25.619  ICD-9-CM: 719.51          Referring practitioner: Hong Herndon, *  Date of Initial Visit: Type: THERAPY  Noted: 2023  Today's Date: 2023    VISIT#: 12    Subjective Patient reports he is doing well today with no significant shoulder pain.  Patient recently returned from vacation but was able to perform his exercises.      Objective     See Exercise, Manual, and Modality Logs for complete treatment.     Assessment/Plan Patient tolerated progression of exercise with reports of soreness into his R shoulder with manual interventions most notably flexion and abduction near end range PROM.  Patient's ROM is improving but progression has been slow.  He has progressed strengthening with no complaints of pain but fatigue noted of RUE.    STG:  -Patient will be independent with initial HEP in 3 weeks. MET  -Patient will demonstrate increased R shoulder external rotation PROM to 35 degrees in 4 weeks. MET  -Patient will demonstrate improved PROM of R shoulder flexion to 120 degrees and abduction to 90 degrees for progression of protocol in 4 weeks. Partially Flex 140 deg abd 85 deg  -Patient will report a reduction in R shoulder pain by >/=25% for improved sleep pattern in 4 weeks. MET    LTG:  -Patient will  demonstrate increased R shoulder flexion and abduction to WFL for improved ability to reach overhead in 12 weeks. NOT MET  -Patient will demonstrate increased strength of R shoulder to grossly 4+ to 5/5 for ability to complete household tasks independently in 12 weeks. NOT MET  -Patient will be able to return to exercise without complaints of shoulder pain in 12 weeks. Progressing  -Patient will report min to no R shoulder pain for improved ability to perform IADLs in 12 weeks. Progressing  -Patient will demonstrate increased AROM of R shoulder IR to 70 degrees and ER to 60 degrees for independence with dressing such as pulling up pants and donning jacket in 12 weeks NOT MET    Progress per Plan of Care and Progress strengthening /stabilization /functional activity         Timed:         Manual Therapy:    15     mins  62204;     Therapeutic Exercise:    15     mins  88926;     Neuromuscular Astrid:        mins  72194;    Therapeutic Activity:     13     mins  14547;     Gait Training:           mins  28972;     Ultrasound:          mins  79776;    Ionto                                   mins   70728    Un-Timed:  Electrical Stimulation:         mins  32745 ( );  Dry Needling          mins self-pay 20560; 20561  Traction          mins 50520      Timed Treatment:   43   mins   Total Treatment:     43   mins    Paz Milian PT  [place] License # [id number]  Physical Therapist

## 2023-04-27 ENCOUNTER — TREATMENT (OUTPATIENT)
Dept: PHYSICAL THERAPY | Facility: CLINIC | Age: 58
End: 2023-04-27
Payer: OTHER GOVERNMENT

## 2023-04-27 DIAGNOSIS — M25.619 LIMITED RANGE OF MOTION (ROM) OF SHOULDER: ICD-10-CM

## 2023-04-27 DIAGNOSIS — Z96.611 STATUS POST TOTAL REPLACEMENT OF RIGHT SHOULDER: Primary | ICD-10-CM

## 2023-04-27 DIAGNOSIS — M25.511 ACUTE PAIN OF RIGHT SHOULDER: ICD-10-CM

## 2023-04-27 PROCEDURE — 97110 THERAPEUTIC EXERCISES: CPT | Performed by: PHYSICAL THERAPIST

## 2023-04-27 PROCEDURE — 97140 MANUAL THERAPY 1/> REGIONS: CPT | Performed by: PHYSICAL THERAPIST

## 2023-04-27 NOTE — PROGRESS NOTES
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 Pt. states the HEP is going well he just feels weak in that arm still.    Objective   See Exercise, Manual, and Modality Logs for complete treatment.     Assessment/Plan   Pt. Tolerates treatment well this visit and continues to benefit from stretching with overpressure to improve mobility. Pt. Relaxes better as stretches are provided this date and shows good scap engagement with exercises this date.    STG:  -Patient will be independent with initial HEP in 3 weeks. MET  -Patient will demonstrate increased R shoulder external rotation PROM to 35 degrees in 4 weeks. MET  -Patient will demonstrate improved PROM of R shoulder flexion to 120 degrees and abduction to 90 degrees for progression of protocol in 4 weeks. Partially Flex 140 deg abd 85 deg  -Patient will report a reduction in R shoulder pain by >/=25% for improved sleep pattern in 4 weeks. MET    LTG:  -Patient will demonstrate increased R shoulder flexion and abduction to WFL for improved ability to reach overhead in 12 weeks. NOT MET  -Patient will demonstrate increased strength of R shoulder to grossly 4+ to 5/5 for ability to complete household tasks independently in 12 weeks. NOT MET  -Patient will be able to return to exercise without complaints of shoulder pain in 12 weeks. Progressing  -Patient will report min to no R shoulder pain for improved ability to perform IADLs in 12 weeks. Progressing  -Patient will demonstrate increased AROM of R shoulder IR to 70 degrees and ER to 60  degrees for independence with dressing such as pulling up pants and donning jacket in 12 weeks NOT MET    Progress strengthening /stabilization /functional activity           Timed:         Manual Therapy:    15     mins  05084;     Therapeutic Exercise:    15     mins  88900;         Timed Treatment:   30   mins   Total Treatment:     30   mins    Nydia Figueroa PTA  Physical Therapist Assistant License #[id number]

## 2023-05-05 ENCOUNTER — TREATMENT (OUTPATIENT)
Dept: PHYSICAL THERAPY | Facility: CLINIC | Age: 58
End: 2023-05-05
Payer: OTHER GOVERNMENT

## 2023-05-05 DIAGNOSIS — M25.619 LIMITED RANGE OF MOTION (ROM) OF SHOULDER: ICD-10-CM

## 2023-05-05 DIAGNOSIS — Z96.611 STATUS POST TOTAL REPLACEMENT OF RIGHT SHOULDER: Primary | ICD-10-CM

## 2023-05-05 DIAGNOSIS — M25.511 ACUTE PAIN OF RIGHT SHOULDER: ICD-10-CM

## 2023-05-05 PROCEDURE — 97110 THERAPEUTIC EXERCISES: CPT | Performed by: PHYSICAL THERAPIST

## 2023-05-05 PROCEDURE — 97140 MANUAL THERAPY 1/> REGIONS: CPT | Performed by: PHYSICAL THERAPIST

## 2023-05-05 NOTE — PROGRESS NOTES
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    Objective   See Exercise, Manual, and Modality Logs for complete treatment.     Assessment/Plan  Pt. completes activity well this date adding 8 lb weight for curls and bent over rows. Pt. Has no reports of pin and tolerates manual stretch well benefiting form overpressure for increased motion.    STG:  -Patient will be independent with initial HEP in 3 weeks. MET  -Patient will demonstrate increased R shoulder external rotation PROM to 35 degrees in 4 weeks. MET  -Patient will demonstrate improved PROM of R shoulder flexion to 120 degrees and abduction to 90 degrees for progression of protocol in 4 weeks. Partially Flex 140 deg abd 85 deg  -Patient will report a reduction in R shoulder pain by >/=25% for improved sleep pattern in 4 weeks. MET    LTG:  -Patient will demonstrate increased R shoulder flexion and abduction to WFL for improved ability to reach overhead in 12 weeks. NOT MET  -Patient will demonstrate increased strength of R shoulder to grossly 4+ to 5/5 for ability to complete household tasks independently in 12 weeks. NOT MET  -Patient will be able to return to exercise without complaints of shoulder pain in 12 weeks. Progressing  -Patient will report min to no R shoulder pain for improved ability to perform IADLs in 12 weeks. Progressing  -Patient will demonstrate increased AROM of R shoulder IR to 70 degrees and ER to 60 degrees for independence with dressing such as pulling up pants and donning jacket in 12 weeks NOT  MET    Progress strengthening /stabilization /functional activity           Timed:         Manual Therapy:    15     mins  01052;     Therapeutic Exercise:    15     mins  14413;         Timed Treatment:   30   mins   Total Treatment:     30   mins    Nydia Figueroa PTA  Physical Therapist Assistant License #[id number]

## 2023-05-11 ENCOUNTER — TREATMENT (OUTPATIENT)
Dept: PHYSICAL THERAPY | Facility: CLINIC | Age: 58
End: 2023-05-11
Payer: OTHER GOVERNMENT

## 2023-05-11 DIAGNOSIS — M25.619 LIMITED RANGE OF MOTION (ROM) OF SHOULDER: ICD-10-CM

## 2023-05-11 DIAGNOSIS — M25.511 ACUTE PAIN OF RIGHT SHOULDER: ICD-10-CM

## 2023-05-11 DIAGNOSIS — Z96.611 STATUS POST TOTAL REPLACEMENT OF RIGHT SHOULDER: Primary | ICD-10-CM

## 2023-05-11 PROCEDURE — 97140 MANUAL THERAPY 1/> REGIONS: CPT | Performed by: PHYSICAL THERAPIST

## 2023-05-11 PROCEDURE — 97110 THERAPEUTIC EXERCISES: CPT | Performed by: PHYSICAL THERAPIST

## 2023-05-11 NOTE — PROGRESS NOTES
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  Subjective Questionnaire: PT Functional Test: Quick DASH 48%  Clinical Progress: improved  Home Program Compliance: Yes  Treatment has included: therapeutic exercise, neuromuscular re-education, manual therapy, therapeutic activity and moist heat    Subjective   Pain: 0/10 just complains of stiffness into his shoulder  Objective     AROM measured in supine  Flexion: 0-135 degrees  Abduction: 0-112 degrees  ER: 0-55 degrees  IR: 0-30 degrees    MMT R shoulder  Flex 4/5 Ext 4+/5 Abd 3+/5 ER 3/5 IR 3/5    Assessment/Plan   STG:  -Patient will be independent with initial HEP in 3 weeks. MET  -Patient will demonstrate increased R shoulder external rotation PROM to 35 degrees in 4 weeks. MET  -Patient will demonstrate improved PROM of R shoulder flexion to 120 degrees and abduction to 90 degrees for progression of protocol in 4 weeks. Partially Flex 140 deg abd 85 deg  -Patient will report a reduction in R shoulder pain by >/=25% for improved sleep pattern in 4 weeks. MET    LTG:  -Patient will demonstrate increased R shoulder flexion and abduction to WFL for improved ability to reach overhead in 12 weeks. NOT MET  -Patient will demonstrate increased strength of R shoulder to grossly 4+ to 5/5 for ability to complete household tasks independently in 12 weeks. NOT MET  -Patient will be able to return to exercise without complaints of shoulder pain in 12 weeks. NOT MET,  Progressing  -Patient will report min to no R shoulder pain for improved ability to perform IADLs in 12 weeks. MET  -Patient will demonstrate increased AROM of R shoulder IR to 70 degrees and ER to 60 degrees for independence with dressing such as pulling up pants and donning jacket in 12 weeks NOT MET, progressing  Progress toward previous goals: Partially Met    Patient has attended 16 PT sessions with steady progress towards goals.  Patient demonstrates continually improving R shoulder AROM but tightness continues to be noted into all planes, especially with abduction and internal rotation.  He reports difficulty with reaching overhead and behind his back such as with bathing.  His strength is also improving but is grossly 4/5.  Recommending continue PT for 2x/week for up to 12 additional weeks for improved ROM and strength in order to be independent with IADLs and report no difficulty with ADLs including bathing/dressing.      Recommendations: Continue as planned  Timeframe: 3 months   Frequency: 2x/week  Prognosis to achieve goals: good    PT Signature: Paz Milian, PT  Physical Therapist  [place] License # [id number]  Electronically signed by Paz Milian PT, 05/11/23, 1:01 PM EDT    Certification Period: 5/11/2023 thru 8/8/2023  I certify that the therapy services are furnished while this patient is under my care.  The services outline above are required by this patient, and will be reviewed every 90 days.    Based upon review of the patient's progress and continued therapy plan, it is my medical opinion that Maninder Edwards should continue physical therapy treatment at [facility] PHYS[facility] TH[facility]  [address] [place] [address] [address] [place] [address] [phone].    Signature: ______________________________________________  Hong Herndon MD  Date: _____________________________________    Timed:         Manual Therapy:    15     mins  98692;     Therapeutic Exercise:    15      mins  57618;     Neuromuscular Astrid:        mins  62855;    Therapeutic Activity:          mins  52290;     Gait Training:           mins  19679;     Ultrasound:          mins  91683;    Ionto                                   mins   05115  Self Care                            mins   40011    Un-Timed:  Electrical Stimulation:         mins  84356 ( );  Dry Needling          mins self-pay  Traction          mins 60886  Low Eval          Mins  52784  Mod Eval          Mins  23340  High Eval                            Mins  12154  Re-Eval                               mins  05616      Timed Treatment:   30   mins   Total Treatment:     30   mins

## 2023-05-18 ENCOUNTER — TREATMENT (OUTPATIENT)
Dept: PHYSICAL THERAPY | Facility: CLINIC | Age: 58
End: 2023-05-18
Payer: OTHER GOVERNMENT

## 2023-05-18 DIAGNOSIS — M25.619 LIMITED RANGE OF MOTION (ROM) OF SHOULDER: ICD-10-CM

## 2023-05-18 DIAGNOSIS — M25.511 ACUTE PAIN OF RIGHT SHOULDER: ICD-10-CM

## 2023-05-18 DIAGNOSIS — Z96.611 STATUS POST TOTAL REPLACEMENT OF RIGHT SHOULDER: Primary | ICD-10-CM

## 2023-05-18 PROCEDURE — 97110 THERAPEUTIC EXERCISES: CPT | Performed by: PHYSICAL THERAPIST

## 2023-05-18 PROCEDURE — 97140 MANUAL THERAPY 1/> REGIONS: CPT | Performed by: PHYSICAL THERAPIST

## 2023-05-18 NOTE — PROGRESS NOTES
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 Pt. States he has been stretching and exercising daily to encourage strength and mobility.    Objective   See Exercise, Manual, and Modality Logs for complete treatment.     Assessment/Plan   Pt. Is responding well to activity increase and overpressure stretch pt does good to breathe through stretching and allows for good passive movement to be achieved. Pt. Is still exhibiting axillary region tightness sin lateral scap border but improvement is noted during flexion and abduction this date.    STG:  -Patient will be independent with initial HEP in 3 weeks. MET  -Patient will demonstrate increased R shoulder external rotation PROM to 35 degrees in 4 weeks. MET  -Patient will demonstrate improved PROM of R shoulder flexion to 120 degrees and abduction to 90 degrees for progression of protocol in 4 weeks. Partially Flex 140 deg abd 85 deg  -Patient will report a reduction in R shoulder pain by >/=25% for improved sleep pattern in 4 weeks. MET    LTG:  -Patient will demonstrate increased R shoulder flexion and abduction to WFL for improved ability to reach overhead in 12 weeks. NOT MET  -Patient will demonstrate increased strength of R shoulder to grossly 4+ to 5/5 for ability to complete household tasks independently in 12 weeks. NOT MET  -Patient will be able to return to exercise without complaints of shoulder pain in 12 weeks. NOT MET, Progressing  -Patient will report min to no R shoulder pain for improved ability to perform IADLs  in 12 weeks. MET  -Patient will demonstrate increased AROM of R shoulder IR to 70 degrees and ER to 60 degrees for independence with dressing such as pulling up pants and donning jacket in 12 weeks NOT MET, progressing  Progress toward previous goals: Partially Met    Progress strengthening /stabilization /functional activity           Timed:         Manual Therapy:    10     mins  39223;     Therapeutic Exercise:    20     mins  65230;       Timed Treatment:   30   mins   Total Treatment:     30   mins    Nydia Figueroa PTA  Physical Therapist Assistant License #[id number]

## 2023-05-22 ENCOUNTER — OFFICE VISIT (OUTPATIENT)
Dept: ORTHOPEDIC SURGERY | Facility: CLINIC | Age: 58
End: 2023-05-22
Payer: OTHER GOVERNMENT

## 2023-05-22 VITALS — HEIGHT: 68 IN | BODY MASS INDEX: 19.55 KG/M2 | HEART RATE: 81 BPM | WEIGHT: 129 LBS

## 2023-05-22 DIAGNOSIS — M19.011 OSTEOARTHRITIS OF RIGHT GLENOHUMERAL JOINT: Primary | ICD-10-CM

## 2023-05-22 PROCEDURE — 99212 OFFICE O/P EST SF 10 MIN: CPT | Performed by: ORTHOPAEDIC SURGERY

## 2023-05-22 NOTE — PROGRESS NOTES
"     Patient ID: Maninder Edwards is a 57 y.o. male.  2/15/23 right total shoulder arthroplasty  Pain resolved, therapy  Review of Systems:        Objective:    Pulse 81   Ht 172.7 cm (68\")   Wt 58.5 kg (129 lb)   BMI 19.61 kg/m²     Physical Examination:     Right shoulder healed incision passive elevation 160 abduction 130 external rotation 40 internal rotation L5 with intact belly press    Imaging:       Assessment:    Mild stiffness after shoulder arthroplasty    Plan:   Continue home based program of stretching he can begin strengthening at home as well gradual activity as tolerated x-ray in 3 months      Procedures          Disclaimer: Part of this note may be an electronic transcription/translation of spoken language to printed text using the Dragon Dictation System  "

## 2023-05-23 ENCOUNTER — TREATMENT (OUTPATIENT)
Dept: PHYSICAL THERAPY | Facility: CLINIC | Age: 58
End: 2023-05-23
Payer: OTHER GOVERNMENT

## 2023-05-23 DIAGNOSIS — M25.511 ACUTE PAIN OF RIGHT SHOULDER: ICD-10-CM

## 2023-05-23 DIAGNOSIS — Z96.611 STATUS POST TOTAL REPLACEMENT OF RIGHT SHOULDER: Primary | ICD-10-CM

## 2023-05-23 DIAGNOSIS — M25.619 LIMITED RANGE OF MOTION (ROM) OF SHOULDER: ICD-10-CM

## 2023-05-23 NOTE — PROGRESS NOTES
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]   Problems Addressed this Visit    None  Visit Diagnoses     Status post total replacement of right shoulder    -  Primary    Acute pain of right shoulder        Limited range of motion (ROM) of shoulder          Diagnoses       Codes Comments    Status post total replacement of right shoulder    -  Primary ICD-10-CM: Z96.611  ICD-9-CM: V43.61     Acute pain of right shoulder     ICD-10-CM: M25.511  ICD-9-CM: 719.41     Limited range of motion (ROM) of shoulder     ICD-10-CM: M25.619  ICD-9-CM: 719.51          Referring practitioner: Hong Herndon, *  Date of Initial Visit: Type: THERAPY  Noted: 2023  Today's Date: 2023    VISIT#: 17    Subjective Patient reports his follow up with his surgeon went well but was encouraged to continue with PT and emphasis on stretching.      Objective     See Exercise, Manual, and Modality Logs for complete treatment.     Assessment/Plan Progressed manual interventions with slight joint distraction prior to passive range of motion with no increase in discomfort noted.  Firm end feel noted at ~115 degrees of abduction and ~125/130 degrees of flexion.  Exercise performed first for improved blood flow and mobility to shoulder complex for improved stretch.  Patient denied any increased pain with manual interventions or exercise.    Progress per Plan of Care and Progress strengthening /stabilization /functional activity         Timed:         Manual Therapy:    15     mins  20841;     Therapeutic Exercise:         mins  69304;     Neuromuscular Astrid:        mins  73683;    Therapeutic Activity:     15     mins  12808;     Gait Training:           mins  53362;     Ultrasound:          mins  96613;    Ionto                                   mins    87027    Un-Timed:  Electrical Stimulation:         mins  12809 ( );  Dry Needling          mins self-pay 20560; 20561  Traction          mins 33341      Timed Treatment:   30   mins   Total Treatment:     30   mins    Paz Milian PT  [place] License # [id number]  Physical Therapist

## 2023-05-25 ENCOUNTER — TREATMENT (OUTPATIENT)
Dept: PHYSICAL THERAPY | Facility: CLINIC | Age: 58
End: 2023-05-25
Payer: OTHER GOVERNMENT

## 2023-05-25 DIAGNOSIS — M25.511 ACUTE PAIN OF RIGHT SHOULDER: ICD-10-CM

## 2023-05-25 DIAGNOSIS — M25.619 LIMITED RANGE OF MOTION (ROM) OF SHOULDER: ICD-10-CM

## 2023-05-25 DIAGNOSIS — Z96.611 STATUS POST TOTAL REPLACEMENT OF RIGHT SHOULDER: Primary | ICD-10-CM

## 2023-05-25 NOTE — PROGRESS NOTES
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    Objective   See Exercise, Manual, and Modality Logs for complete treatment.     Assessment/Plan   Pt. Tolerates exercise and stretch well today with minimal discomfort. Pt. achieves good stretch with overpressure today and is showing gains in ROM slowly as well as good gains in scapular musculature and shoulder stabilizing musculature.    STG:  -Patient will be independent with initial HEP in 3 weeks. MET  -Patient will demonstrate increased R shoulder external rotation PROM to 35 degrees in 4 weeks. MET  -Patient will demonstrate improved PROM of R shoulder flexion to 120 degrees and abduction to 90 degrees for progression of protocol in 4 weeks. Partially Flex 140 deg abd 85 deg  -Patient will report a reduction in R shoulder pain by >/=25% for improved sleep pattern in 4 weeks. MET    LTG:  -Patient will demonstrate increased R shoulder flexion and abduction to WFL for improved ability to reach overhead in 12 weeks. NOT MET  -Patient will demonstrate increased strength of R shoulder to grossly 4+ to 5/5 for ability to complete household tasks independently in 12 weeks. NOT MET  -Patient will be able to return to exercise without complaints of shoulder pain in 12 weeks. NOT MET, Progressing  -Patient will report min to no R shoulder pain for improved ability to perform IADLs in 12 weeks. MET  -Patient will demonstrate increased AROM of R shoulder IR to 70 degrees and ER to 60 degrees for independence with dressing such as pulling up  pants and donning jacket in 12 weeks NOT MET, progressing  Progress toward previous goals: Partially Met    Progress strengthening /stabilization /functional activity           Timed:         Manual Therapy:    15     mins  40310;     Therapeutic Activity:     15     mins  48342;       Timed Treatment:   30   mins   Total Treatment:     30   mins    Nydia Figueroa PTA  Physical Therapist Assistant License #[id number]

## 2023-06-06 ENCOUNTER — TREATMENT (OUTPATIENT)
Dept: PHYSICAL THERAPY | Facility: CLINIC | Age: 58
End: 2023-06-06
Payer: OTHER GOVERNMENT

## 2023-06-06 DIAGNOSIS — Z96.611 STATUS POST TOTAL REPLACEMENT OF RIGHT SHOULDER: Primary | ICD-10-CM

## 2023-06-06 DIAGNOSIS — M25.511 ACUTE PAIN OF RIGHT SHOULDER: ICD-10-CM

## 2023-06-06 DIAGNOSIS — M25.619 LIMITED RANGE OF MOTION (ROM) OF SHOULDER: ICD-10-CM

## 2023-06-06 PROCEDURE — 97110 THERAPEUTIC EXERCISES: CPT | Performed by: PHYSICAL THERAPIST

## 2023-06-06 PROCEDURE — 97140 MANUAL THERAPY 1/> REGIONS: CPT | Performed by: PHYSICAL THERAPIST

## 2023-06-06 NOTE — PROGRESS NOTES
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]   Problems Addressed this Visit    None  Visit Diagnoses       Status post total replacement of right shoulder    -  Primary    Acute pain of right shoulder        Limited range of motion (ROM) of shoulder              Diagnoses         Codes Comments    Status post total replacement of right shoulder    -  Primary ICD-10-CM: Z96.611  ICD-9-CM: V43.61     Acute pain of right shoulder     ICD-10-CM: M25.511  ICD-9-CM: 719.41     Limited range of motion (ROM) of shoulder     ICD-10-CM: M25.619  ICD-9-CM: 719.51            Referring practitioner: Hong Herndon, *  Date of Initial Visit: Type: THERAPY  Noted: 2023  Today's Date: 2023    VISIT#: 19    Subjective Patient reports his shoulder continues to have tightness and weakness but overall, it is improving slowly.  Patient reports compliance with HEP.      Objective     See Exercise, Manual, and Modality Logs for complete treatment.     Assessment/Plan Progressed ROM and strengthening with reports of tightness and mild pain into his shoulder with end range mobility.  Patient's external rotation is improving well but significant tightness noted into flexion, abduction, and internal rotation.  Continue to progress mobility, strengthening, and functional activity as tolerated.    Progress per Plan of Care and Progress strengthening /stabilization /functional activity         Timed:         Manual Therapy:    15     mins  47816;     Therapeutic Exercise:    18     mins  78911;     Neuromuscular Astrid:        mins  06869;    Therapeutic Activity:          mins  93604;     Gait Training:           mins  76713;     Ultrasound:          mins  01037;    Ionto                                   mins   47472    Un-Timed:  Electrical Stimulation:          mins  38003 ( );  Dry Needling          mins self-pay 20560; 20561  Traction          mins 78277      Timed Treatment:   33   mins   Total Treatment:     33   mins    Paz Milian PT  [place] License # [id number]  Physical Therapist

## 2023-06-08 ENCOUNTER — TREATMENT (OUTPATIENT)
Dept: PHYSICAL THERAPY | Facility: CLINIC | Age: 58
End: 2023-06-08
Payer: OTHER GOVERNMENT

## 2023-06-08 DIAGNOSIS — Z96.611 STATUS POST TOTAL REPLACEMENT OF RIGHT SHOULDER: Primary | ICD-10-CM

## 2023-06-08 DIAGNOSIS — M25.511 ACUTE PAIN OF RIGHT SHOULDER: ICD-10-CM

## 2023-06-08 DIAGNOSIS — M25.619 LIMITED RANGE OF MOTION (ROM) OF SHOULDER: ICD-10-CM

## 2023-06-08 PROCEDURE — 97110 THERAPEUTIC EXERCISES: CPT | Performed by: PHYSICAL THERAPIST

## 2023-06-08 PROCEDURE — 97140 MANUAL THERAPY 1/> REGIONS: CPT | Performed by: PHYSICAL THERAPIST

## 2023-06-09 NOTE — PROGRESS NOTES
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]   Problems Addressed this Visit    None  Visit Diagnoses       Status post total replacement of right shoulder    -  Primary    Acute pain of right shoulder        Limited range of motion (ROM) of shoulder              Diagnoses         Codes Comments    Status post total replacement of right shoulder    -  Primary ICD-10-CM: Z96.611  ICD-9-CM: V43.61     Acute pain of right shoulder     ICD-10-CM: M25.511  ICD-9-CM: 719.41     Limited range of motion (ROM) of shoulder     ICD-10-CM: M25.619  ICD-9-CM: 719.51            Referring practitioner: Hong Herndon, *  Date of Initial Visit: Type: THERAPY  Noted: 2023  Today's Date: 2023     VISIT#: 20    Subjective Patient reports he is doing well with no complaints of shoulder pain.  He continues to have tightness with reaching behind his back and overhead.      Objective     See Exercise, Manual, and Modality Logs for complete treatment.     Assessment/Plan Patient continues to demonstrate tightness with internal rotation, flexion, and abduction.  Added sleeper stretch for improved internal rotation AROM in order to perform behind the back activities such as washing his back.  Patient would continue to benefit from manual interventions, strengthening, and mobility for improved functional ability.    Progress per Plan of Care and Progress strengthening /stabilization /functional activity         Timed:         Manual Therapy:    15     mins  54019;     Therapeutic Exercise:    18     mins  77438;     Neuromuscular Astird:        mins  94404;    Therapeutic Activity:          mins  28472;     Gait Training:           mins  61292;     Ultrasound:          mins  25165;    Ionto                                   mins    81071    Un-Timed:  Electrical Stimulation:         mins  62152 ( );  Dry Needling          mins self-pay 20560; 20561  Traction          mins 13589      Timed Treatment:   33   mins   Total Treatment:     33   mins    Paz Milian PT  [place] License # [id number]  Physical Therapist

## 2023-06-13 ENCOUNTER — TREATMENT (OUTPATIENT)
Dept: PHYSICAL THERAPY | Facility: CLINIC | Age: 58
End: 2023-06-13
Payer: OTHER GOVERNMENT

## 2023-06-13 DIAGNOSIS — Z96.611 STATUS POST TOTAL REPLACEMENT OF RIGHT SHOULDER: Primary | ICD-10-CM

## 2023-06-13 DIAGNOSIS — M25.619 LIMITED RANGE OF MOTION (ROM) OF SHOULDER: ICD-10-CM

## 2023-06-13 DIAGNOSIS — M25.511 ACUTE PAIN OF RIGHT SHOULDER: ICD-10-CM

## 2023-06-13 NOTE — PROGRESS NOTES
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]   Problems Addressed this Visit    None  Visit Diagnoses       Status post total replacement of right shoulder    -  Primary    Acute pain of right shoulder        Limited range of motion (ROM) of shoulder              Diagnoses         Codes Comments    Status post total replacement of right shoulder    -  Primary ICD-10-CM: Z96.611  ICD-9-CM: V43.61     Acute pain of right shoulder     ICD-10-CM: M25.511  ICD-9-CM: 719.41     Limited range of motion (ROM) of shoulder     ICD-10-CM: M25.619  ICD-9-CM: 719.51            Referring practitioner: Hong Herndon, *  Date of Initial Visit: Type: THERAPY  Noted: 2023  Today's Date: 2023    VISIT#: 21    Subjective Patient reports his shoulder feels good today and has no complaints.  Patient reports he was able to play shuffle board earlier today without any pain.      Objective     See Exercise, Manual, and Modality Logs for complete treatment.     Assessment/Plan Progressed mobility and strengthening this date with addition of UE PNF, resisted abduction, towel internal rotation stretch.  Patient noted significant tightness and some soreness with internal rotation ROM.  He was able to complete resisted abduction this date through 80 degrees with min shoulder hiking.    STG:  -Patient will be independent with initial HEP in 3 weeks. MET  -Patient will demonstrate increased R shoulder external rotation PROM to 35 degrees in 4 weeks. MET  -Patient will demonstrate improved PROM of R shoulder flexion to 120 degrees and abduction to 90 degrees for progression of protocol in 4 weeks. Partially Flex 140 deg abd 85 deg  -Patient will report a reduction in R shoulder pain by >/=25% for improved sleep pattern in 4 weeks. MET    LTG:  -Patient will  demonstrate increased R shoulder flexion and abduction to WFL for improved ability to reach overhead in 12 weeks. NOT MET  -Patient will demonstrate increased strength of R shoulder to grossly 4+ to 5/5 for ability to complete household tasks independently in 12 weeks. NOT MET  -Patient will be able to return to exercise without complaints of shoulder pain in 12 weeks. NOT MET, Progressing  -Patient will report min to no R shoulder pain for improved ability to perform IADLs in 12 weeks. MET  -Patient will demonstrate increased AROM of R shoulder IR to 70 degrees and ER to 60 degrees for independence with dressing such as pulling up pants and donning jacket in 12 weeks NOT MET, progressing     Progress per Plan of Care and Progress strengthening /stabilization /functional activity         Timed:         Manual Therapy:    14     mins  12860;     Therapeutic Exercise:    15     mins  13511;     Neuromuscular Astrid:        mins  14906;    Therapeutic Activity:     15     mins  21224;     Gait Training:           mins  33522;     Ultrasound:          mins  48181;    Ionto                                   mins   19950    Un-Timed:  Electrical Stimulation:         mins  82892 ( );  Dry Needling          mins self-pay 20560; 20561  Traction          mins 56296      Timed Treatment:   44   mins   Total Treatment:     44   mins    Paz Milian PT  [place] License # [id number]  Physical Therapist

## 2023-06-15 ENCOUNTER — TREATMENT (OUTPATIENT)
Dept: PHYSICAL THERAPY | Facility: CLINIC | Age: 58
End: 2023-06-15
Payer: OTHER GOVERNMENT

## 2023-06-15 DIAGNOSIS — M25.511 ACUTE PAIN OF RIGHT SHOULDER: ICD-10-CM

## 2023-06-15 DIAGNOSIS — Z96.611 STATUS POST TOTAL REPLACEMENT OF RIGHT SHOULDER: Primary | ICD-10-CM

## 2023-06-15 DIAGNOSIS — M25.619 LIMITED RANGE OF MOTION (ROM) OF SHOULDER: ICD-10-CM

## 2023-06-15 NOTE — PROGRESS NOTES
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]   Problems Addressed this Visit    None  Visit Diagnoses       Status post total replacement of right shoulder    -  Primary    Acute pain of right shoulder        Limited range of motion (ROM) of shoulder              Diagnoses         Codes Comments    Status post total replacement of right shoulder    -  Primary ICD-10-CM: Z96.611  ICD-9-CM: V43.61     Acute pain of right shoulder     ICD-10-CM: M25.511  ICD-9-CM: 719.41     Limited range of motion (ROM) of shoulder     ICD-10-CM: M25.619  ICD-9-CM: 719.51            Referring practitioner: Hong Herndon, *  Date of Initial Visit: Type: THERAPY  Noted: 2023  Today's Date: 6/15/2023    VISIT#: 22    Subjective Patient reports no pain or soreness into his shoulder.  He reports the towel internal rotation stretch made him realize how tight he was with that motion.      Objective     See Exercise, Manual, and Modality Logs for complete treatment.     Assessment/Plan Patient with continued tightness into R shoulder most notably with elevation and internal rotation.  He did demonstrate improved ROM with flexion AAROM with shoulder pulleys today.  Strength is also improving with increased resistance with all exercises this date.  Continue to progress strengthening, mobility, and functional activity as tolerated.    Progress per Plan of Care and Progress strengthening /stabilization /functional activity         Timed:         Manual Therapy:    15     mins  38383;     Therapeutic Exercise:         mins  73414;     Neuromuscular Astrid:        mins  55062;    Therapeutic Activity:     17     mins  79404;     Gait Training:           mins  88030;     Ultrasound:          mins  27934;    Ionto                                   mins    48265    Un-Timed:  Electrical Stimulation:         mins  90426 ( );  Dry Needling          mins self-pay 20560; 20561  Traction          mins 12824      Timed Treatment:   32   mins   Total Treatment:     32   mins    Paz Milian PT  [place] License # [id number]  Physical Therapist

## 2023-06-19 ENCOUNTER — TREATMENT (OUTPATIENT)
Dept: PHYSICAL THERAPY | Facility: CLINIC | Age: 58
End: 2023-06-19
Payer: OTHER GOVERNMENT

## 2023-06-19 DIAGNOSIS — Z96.611 STATUS POST TOTAL REPLACEMENT OF RIGHT SHOULDER: Primary | ICD-10-CM

## 2023-06-19 DIAGNOSIS — M25.619 LIMITED RANGE OF MOTION (ROM) OF SHOULDER: ICD-10-CM

## 2023-06-19 DIAGNOSIS — M25.511 ACUTE PAIN OF RIGHT SHOULDER: ICD-10-CM

## 2023-06-19 PROCEDURE — 97140 MANUAL THERAPY 1/> REGIONS: CPT | Performed by: PHYSICAL THERAPIST

## 2023-06-19 PROCEDURE — 97110 THERAPEUTIC EXERCISES: CPT | Performed by: PHYSICAL THERAPIST

## 2023-06-19 NOTE — PROGRESS NOTES
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    Objective   See Exercise, Manual, and Modality Logs for complete treatment.     Assessment/Plan  Pt. Tolerates treatment well Pt. Is showing good scapular control and improving strength continually however this date flexion and abduction were a bit limited by tight end feels. Pt. Will continue to benefit from daily stretching to limit scar tissue and tightness.    STG:  -Patient will be independent with initial HEP in 3 weeks. MET  -Patient will demonstrate increased R shoulder external rotation PROM to 35 degrees in 4 weeks. MET  -Patient will demonstrate improved PROM of R shoulder flexion to 120 degrees and abduction to 90 degrees for progression of protocol in 4 weeks. Partially Flex 140 deg abd 85 deg  -Patient will report a reduction in R shoulder pain by >/=25% for improved sleep pattern in 4 weeks. MET    LTG:  -Patient will demonstrate increased R shoulder flexion and abduction to WFL for improved ability to reach overhead in 12 weeks. NOT MET  -Patient will demonstrate increased strength of R shoulder to grossly 4+ to 5/5 for ability to complete household tasks independently in 12 weeks. NOT MET  -Patient will be able to return to exercise without complaints of shoulder pain in 12 weeks. NOT MET, Progressing  -Patient will report min to no R shoulder pain for improved ability to perform IADLs in 12 weeks. MET  -Patient will demonstrate increased AROM of R shoulder IR to 70 degrees and ER to 60 degrees for independence with  dressing such as pulling up pants and donning jacket in 12 weeks NOT MET, progressing  Progress toward previous goals: Partially Met    Progress strengthening /stabilization /functional activity           Timed:         Manual Therapy:    10     mins  60657;     Therapeutic Exercise:    20     mins  02569;       Timed Treatment:  30    mins   Total Treatment:     30   mins    Nydia Figueroa PTA  Physical Therapist Assistant License #[id number]

## 2023-07-25 ENCOUNTER — TREATMENT (OUTPATIENT)
Dept: PHYSICAL THERAPY | Facility: CLINIC | Age: 58
End: 2023-07-25
Payer: OTHER GOVERNMENT

## 2023-07-25 DIAGNOSIS — M25.619 LIMITED RANGE OF MOTION (ROM) OF SHOULDER: ICD-10-CM

## 2023-07-25 DIAGNOSIS — Z96.611 STATUS POST TOTAL REPLACEMENT OF RIGHT SHOULDER: Primary | ICD-10-CM

## 2023-07-25 DIAGNOSIS — M25.511 ACUTE PAIN OF RIGHT SHOULDER: ICD-10-CM

## 2023-07-25 PROCEDURE — 97140 MANUAL THERAPY 1/> REGIONS: CPT | Performed by: PHYSICAL THERAPIST

## 2023-07-25 PROCEDURE — 97110 THERAPEUTIC EXERCISES: CPT | Performed by: PHYSICAL THERAPIST

## 2023-07-25 NOTE — PROGRESS NOTES
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  Patient has difficulty reaching across to his opposite shoulder and behind his head.  Subjective Questionnaire: PT Functional Test: Quick DASH 32%  Clinical Progress: improved  Home Program Compliance: Yes  Treatment has included: therapeutic exercise, neuromuscular re-education, manual therapy, and therapeutic activity    Subjective   Objective   AROM measured in supine  Flexion: 0-142 degrees  Abduction: 0-115 degrees  ER: 0-55 degrees  IR: 0-60 degrees     MMT R shoulder  Flex 4/5 Ext 4+/5 Abd 3+/5 ER 4+/5 IR /55     Assessment/Plan  STG:  -Patient will be independent with initial HEP in 3 weeks. MET  -Patient will demonstrate increased R shoulder external rotation PROM to 35 degrees in 4 weeks. MET  -Patient will demonstrate improved PROM of R shoulder flexion to 120 degrees and abduction to 90 degrees for progression of protocol in 4 weeks. MET  -Patient will report a reduction in R shoulder pain by >/=25% for improved sleep pattern in 4 weeks. MET    LTG:  -Patient will demonstrate increased R shoulder flexion and abduction to WFL for improved ability to reach overhead in 12 weeks. NOT MET, progressing  -Patient will demonstrate increased strength of R shoulder to grossly 4+ to 5/5 for ability to complete household tasks independently in 12 weeks. PARTIALLY MET, progressing  -Patient will be able to return to exercise without complaints of shoulder pain in 12 weeks. NOT MET, Progressing  -Patient will report min to no R shoulder pain for  improved ability to perform IADLs in 12 weeks. MET  -Patient will demonstrate increased AROM of R shoulder IR to 70 degrees and ER to 60 degrees for independence with dressing such as pulling up pants and donning jacket in 12 weeks NOT MET, progressing   Progress toward previous goals: Partially Met    Patient has attended 30 PT sessions with steady progress towards goals.  His ROM has progressed well with internal and external rotation but is still limited in elevation of the right shoulder including flexion and abduction.  Passive ROM is better compared to active but a firm end feel noted in all planes.  He also demonstrates limitations in shoulder flexion and abduction strength per objective findings above but has good external and internal rotation strength.  He does report difficulty with lifting, quick movements of the shoulder, washing his back, and participating in recreational activities.  Recommending continued PT for 2x/week for an additional 8 weeks for improved independence and ability to complete all ADLs and functional activities.       Recommendations: Continue as planned  Timeframe: 2 months  Prognosis to achieve goals: good    PT Signature: Paz Milian PT  Physical Therapist  [place] License # [id number]  Electronically signed by Paz Milian PT, 07/25/23, 1:44 PM EDT    Certification Period: 8/8/2023 thru 10/22/2023  I certify that the therapy services are furnished while this patient is under my care.  The services outline above are required by this patient, and will be reviewed every 90 days.    Based upon review of the patient's progress and continued therapy plan, it is my medical opinion that Maninder Edwards should continue physical therapy treatment at [facility]  [address] [place] [address] [address] [place] [address] [phone].    Signature: ______________________________________________  Hong Herndon MD  Date:  _____________________________________    Timed:         Manual Therapy:    15     mins  82086;     Therapeutic Exercise:    15     mins  37543;     Neuromuscular Astrid:        mins  01419;    Therapeutic Activity:          mins  22069;     Gait Training:           mins  62039;     Ultrasound:          mins  89093;    Ionto                                   mins   86441  Self Care                            mins   68500    Un-Timed:  Electrical Stimulation:         mins  31648 ( );  Dry Needling          mins self-pay  Traction          mins 51582  Low Eval          Mins  12790  Mod Eval          Mins  76478  High Eval                            Mins  15299  Re-Eval                               mins  16367      Timed Treatment:   30   mins   Total Treatment:     30   mins

## 2023-08-01 ENCOUNTER — TREATMENT (OUTPATIENT)
Dept: PHYSICAL THERAPY | Facility: CLINIC | Age: 58
End: 2023-08-01
Payer: OTHER GOVERNMENT

## 2023-08-01 DIAGNOSIS — M25.511 ACUTE PAIN OF RIGHT SHOULDER: ICD-10-CM

## 2023-08-01 DIAGNOSIS — Z96.611 STATUS POST TOTAL REPLACEMENT OF RIGHT SHOULDER: Primary | ICD-10-CM

## 2023-08-01 PROCEDURE — 97110 THERAPEUTIC EXERCISES: CPT | Performed by: PHYSICAL THERAPIST

## 2023-08-01 PROCEDURE — 97140 MANUAL THERAPY 1/> REGIONS: CPT | Performed by: PHYSICAL THERAPIST

## 2023-08-08 ENCOUNTER — TREATMENT (OUTPATIENT)
Dept: PHYSICAL THERAPY | Facility: CLINIC | Age: 58
End: 2023-08-08
Payer: OTHER GOVERNMENT

## 2023-08-08 DIAGNOSIS — Z96.611 STATUS POST TOTAL REPLACEMENT OF RIGHT SHOULDER: Primary | ICD-10-CM

## 2023-08-08 DIAGNOSIS — M25.511 ACUTE PAIN OF RIGHT SHOULDER: ICD-10-CM

## 2023-08-08 DIAGNOSIS — M25.619 LIMITED RANGE OF MOTION (ROM) OF SHOULDER: ICD-10-CM

## 2023-08-08 PROCEDURE — 97110 THERAPEUTIC EXERCISES: CPT | Performed by: PHYSICAL THERAPIST

## 2023-08-08 PROCEDURE — 97140 MANUAL THERAPY 1/> REGIONS: CPT | Performed by: PHYSICAL THERAPIST

## 2023-08-08 NOTE — PROGRESS NOTES
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 Pt. States he has had no new instances of pain or discomfort in recent history.    Objective   See Exercise, Manual, and Modality Logs for complete treatment.     Assessment/Plan  Pt. Tolerates treatment well this visit still having notable tightness in R UT this date. Pt. Continues to respond well to manual intervention and strengthening for overall mechanics and stability.    STG:  -Patient will be independent with initial HEP in 3 weeks. MET  -Patient will demonstrate increased R shoulder external rotation PROM to 35 degrees in 4 weeks. MET  -Patient will demonstrate improved PROM of R shoulder flexion to 120 degrees and abduction to 90 degrees for progression of protocol in 4 weeks. MET  -Patient will report a reduction in R shoulder pain by >/=25% for improved sleep pattern in 4 weeks. MET    LTG:  -Patient will demonstrate increased R shoulder flexion and abduction to WFL for improved ability to reach overhead in 12 weeks. NOT MET, progressing  -Patient will demonstrate increased strength of R shoulder to grossly 4+ to 5/5 for ability to complete household tasks independently in 12 weeks. PARTIALLY MET, progressing  -Patient will be able to return to exercise without complaints of shoulder pain in 12 weeks. NOT MET, Progressing  -Patient will report min to no R shoulder pain for improved ability to perform IADLs in 12 weeks. MET  -Patient will demonstrate increased AROM of R shoulder IR to 70 degrees and ER to 60 degrees for independence with dressing such as pulling up  pants and donning jacket in 12 weeks NOT MET, progressing   Progress toward previous goals: Partially Met    Progress strengthening /stabilization /functional activity           Timed:         Manual Therapy:    10     mins  97135;     Therapeutic Exercise:    20     mins  48498;         Timed Treatment:   30   mins   Total Treatment:     30   mins    Nydia Figueroa PTA  Physical Therapist Assistant License #[id number]

## 2023-08-10 ENCOUNTER — TREATMENT (OUTPATIENT)
Dept: PHYSICAL THERAPY | Facility: CLINIC | Age: 58
End: 2023-08-10
Payer: OTHER GOVERNMENT

## 2023-08-10 DIAGNOSIS — M25.511 ACUTE PAIN OF RIGHT SHOULDER: ICD-10-CM

## 2023-08-10 DIAGNOSIS — Z96.611 STATUS POST TOTAL REPLACEMENT OF RIGHT SHOULDER: Primary | ICD-10-CM

## 2023-08-10 DIAGNOSIS — M25.619 LIMITED RANGE OF MOTION (ROM) OF SHOULDER: ICD-10-CM

## 2023-08-10 NOTE — PROGRESS NOTES
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]   Problems Addressed this Visit    None  Visit Diagnoses       Status post total replacement of right shoulder    -  Primary    Acute pain of right shoulder        Limited range of motion (ROM) of shoulder              Diagnoses         Codes Comments    Status post total replacement of right shoulder    -  Primary ICD-10-CM: Z96.611  ICD-9-CM: V43.61     Acute pain of right shoulder     ICD-10-CM: M25.511  ICD-9-CM: 719.41     Limited range of motion (ROM) of shoulder     ICD-10-CM: M25.619  ICD-9-CM: 719.51            Referring practitioner: Hong Herndon, *  Date of Initial Visit: Type: THERAPY  Noted: 2023  Today's Date: 8/10/2023    VISIT#: 33    Subjective Patient reports his shoulder is doing fairly well.  He feels like his ROM is gradually improving but does continue to have tightness and weakness into overhead movements.      Objective     See Exercise, Manual, and Modality Logs for complete treatment.     Assessment/Plan Patient tolerated manual interventions well but some discomfort was noted with PROM with overpressure.  His external rotation is near full but biggest limitation is with abduction and internal rotation.  Emphasized continued daily performance of HEP especially with mobility with patient verbalizing understanding.    Progress per Plan of Care and Progress strengthening /stabilization /functional activity         Timed:         Manual Therapy:    15     mins  77738;     Therapeutic Exercise:         mins  64641;     Neuromuscular Astrid:        mins  25118;    Therapeutic Activity:     16     mins  46442;     Gait Training:           mins  46599;     Ultrasound:          mins  22651;    Ionto                                   mins   99242    Un-Timed:  Electrical  Stimulation:         mins  65418 ( );  Dry Needling          mins self-pay 14602; 20561  Traction          mins 90385      Timed Treatment:   31   mins   Total Treatment:     31   mins    Paz Milian PT  [place] License # [id number]  Physical Therapist

## 2023-08-15 ENCOUNTER — TREATMENT (OUTPATIENT)
Dept: PHYSICAL THERAPY | Facility: CLINIC | Age: 58
End: 2023-08-15
Payer: OTHER GOVERNMENT

## 2023-08-15 DIAGNOSIS — M25.511 ACUTE PAIN OF RIGHT SHOULDER: ICD-10-CM

## 2023-08-15 DIAGNOSIS — Z96.611 STATUS POST TOTAL REPLACEMENT OF RIGHT SHOULDER: Primary | ICD-10-CM

## 2023-08-15 DIAGNOSIS — M25.619 LIMITED RANGE OF MOTION (ROM) OF SHOULDER: ICD-10-CM

## 2023-08-15 PROCEDURE — 97140 MANUAL THERAPY 1/> REGIONS: CPT | Performed by: PHYSICAL THERAPIST

## 2023-08-15 PROCEDURE — 97110 THERAPEUTIC EXERCISES: CPT | Performed by: PHYSICAL THERAPIST

## 2023-08-15 NOTE — PROGRESS NOTES
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]   Problems Addressed this Visit    None  Visit Diagnoses       Status post total replacement of right shoulder    -  Primary    Acute pain of right shoulder        Limited range of motion (ROM) of shoulder              Diagnoses         Codes Comments    Status post total replacement of right shoulder    -  Primary ICD-10-CM: Z96.611  ICD-9-CM: V43.61     Acute pain of right shoulder     ICD-10-CM: M25.511  ICD-9-CM: 719.41     Limited range of motion (ROM) of shoulder     ICD-10-CM: M25.619  ICD-9-CM: 719.51            Referring practitioner: Hong Herndon, *  Date of Initial Visit: Type: THERAPY  Noted: 2023  Today's Date: 8/15/2023    VISIT#: 34    Subjective Patient reports his shoulder is feeling fine today with no complaints of pain but does report stiffness in his R shoulder.  Patient states his strength also continues to feel limited with lifting.      Objective     See Exercise, Manual, and Modality Logs for complete treatment.     Assessment/Plan Progressed strengthening and mobility this date with good tolerance to activity.  He noted fatigue of R shoulder musculature with increased weight with rows and addition of overhead press for improved ability to complete overhead activities.  Patient reported tightness at shoulder with towel internal rotation stretch this date.     Progress per Plan of Care and Progress strengthening /stabilization /functional activity         Timed:         Manual Therapy:    15     mins  34404;     Therapeutic Exercise:    15     mins  56932;     Neuromuscular Astrid:        mins  78199;    Therapeutic Activity:     5     mins  61176;     Gait Training:           mins  22365;     Ultrasound:          mins  36888;    Ionto                                    mins   30711    Un-Timed:  Electrical Stimulation:         mins  47838 ( );  Dry Needling          mins self-pay 20560; 20561  Traction          mins 86359      Timed Treatment:   35   mins   Total Treatment:     35   mins    Paz Milian PT  [place] License # [id number]  Physical Therapist

## 2023-08-17 ENCOUNTER — TREATMENT (OUTPATIENT)
Dept: PHYSICAL THERAPY | Facility: CLINIC | Age: 58
End: 2023-08-17
Payer: OTHER GOVERNMENT

## 2023-08-17 DIAGNOSIS — M25.511 ACUTE PAIN OF RIGHT SHOULDER: ICD-10-CM

## 2023-08-17 DIAGNOSIS — Z96.611 STATUS POST TOTAL REPLACEMENT OF RIGHT SHOULDER: Primary | ICD-10-CM

## 2023-08-17 DIAGNOSIS — M25.619 LIMITED RANGE OF MOTION (ROM) OF SHOULDER: ICD-10-CM

## 2023-08-17 PROCEDURE — 97110 THERAPEUTIC EXERCISES: CPT | Performed by: PHYSICAL THERAPIST

## 2023-08-17 PROCEDURE — 97140 MANUAL THERAPY 1/> REGIONS: CPT | Performed by: PHYSICAL THERAPIST

## 2023-08-17 NOTE — PROGRESS NOTES
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 states IR continues to be the most limited movement for him.    Objective   See Exercise, Manual, and Modality Logs for complete treatment.     Assessment/Plan  Pt. Tolerates treatment well and he continues to show good progress in strength and stability. Focus at this time to achieve more stable overhead movement and develop IR mobility.    STG:  -Patient will be independent with initial HEP in 3 weeks. MET  -Patient will demonstrate increased R shoulder external rotation PROM to 35 degrees in 4 weeks. MET  -Patient will demonstrate improved PROM of R shoulder flexion to 120 degrees and abduction to 90 degrees for progression of protocol in 4 weeks. MET  -Patient will report a reduction in R shoulder pain by >/=25% for improved sleep pattern in 4 weeks. MET    LTG:  -Patient will demonstrate increased R shoulder flexion and abduction to WFL for improved ability to reach overhead in 12 weeks. NOT MET, progressing  -Patient will demonstrate increased strength of R shoulder to grossly 4+ to 5/5 for ability to complete household tasks independently in 12 weeks. PARTIALLY MET, progressing  -Patient will be able to return to exercise without complaints of shoulder pain in 12 weeks. NOT MET, Progressing  -Patient will report min to no R shoulder pain for improved ability to perform IADLs in 12 weeks. MET  -Patient will demonstrate increased AROM of R shoulder IR to 70 degrees and ER to 60 degrees for independence with dressing such as  pulling up pants and donning jacket in 12 weeks NOT MET, progressing   Progress toward previous goals: Partially Met    Progress strengthening /stabilization /functional activity           Timed:         Manual Therapy:    10     mins  86457;     Therapeutic Exercise:    20     mins  33562;     Therapeutic Activity:     5     mins  26250;       Timed Treatment:   35   mins   Total Treatment:     35   mins    Nydia Figueroa PTA  Physical Therapist Assistant License #[id number]

## 2023-08-22 ENCOUNTER — TREATMENT (OUTPATIENT)
Dept: PHYSICAL THERAPY | Facility: CLINIC | Age: 58
End: 2023-08-22
Payer: OTHER GOVERNMENT

## 2023-08-22 DIAGNOSIS — M25.511 ACUTE PAIN OF RIGHT SHOULDER: ICD-10-CM

## 2023-08-22 DIAGNOSIS — M25.619 LIMITED RANGE OF MOTION (ROM) OF SHOULDER: ICD-10-CM

## 2023-08-22 DIAGNOSIS — Z96.611 STATUS POST TOTAL REPLACEMENT OF RIGHT SHOULDER: Primary | ICD-10-CM

## 2023-08-22 PROCEDURE — 97140 MANUAL THERAPY 1/> REGIONS: CPT | Performed by: PHYSICAL THERAPIST

## 2023-08-22 PROCEDURE — 97110 THERAPEUTIC EXERCISES: CPT | Performed by: PHYSICAL THERAPIST

## 2023-08-22 NOTE — PROGRESS NOTES
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  He   Subjective Questionnaire: PT Functional Test: Quick DASH 50%  Clinical Progress: improved  Home Program Compliance: Yes  Treatment has included: therapeutic exercise, neuromuscular re-education, manual therapy, and therapeutic activity      Subjective   Objective   AROM measured in supine  Flexion: 0-145 degrees  Abduction: 0-115 degrees  ER: 0-70 degrees  IR: 0-68 degrees     MMT R shoulder  Flex 4+/5 Ext 5/5 Abd 4+/5 ER 4+/5 IR /55  Assessment/Plan  STG:  -Patient will be independent with initial HEP in 3 weeks. MET  -Patient will demonstrate increased R shoulder external rotation PROM to 35 degrees in 4 weeks. MET  -Patient will demonstrate improved PROM of R shoulder flexion to 120 degrees and abduction to 90 degrees for progression of protocol in 4 weeks. MET  -Patient will report a reduction in R shoulder pain by >/=25% for improved sleep pattern in 4 weeks. MET    LTG:  -Patient will demonstrate increased R shoulder flexion and abduction to WFL for improved ability to reach overhead in 12 weeks. NOT MET, progressing  -Patient will demonstrate increased strength of R shoulder to grossly 4+ to 5/5 for ability to complete household tasks independently in 12 weeks. MET  -Patient will be able to return to exercise without complaints of shoulder pain in 12 weeks. NOT MET, Progressing  -Patient will report min to no R shoulder pain for improved ability to perform IADLs in 12 weeks. MET  -Patient will demonstrate  increased AROM of R shoulder IR to 70 degrees and ER to 60 degrees for independence with dressing such as pulling up pants and donning jacket in 12 weeks MET  Progress toward previous goals: Partially Met     Patient has been compliant with attendance at therapy and performance of his HEP.  Therapy has focused recently on strength and mobility of his R shoulder joint due to stiffness with flexion and abduction AROM.  Firm end feel noted with both motions before end range of motion.  He has noted difficulty with overhead lifting/reaching such as reaching into cabinets.  He also notes limitations in overhead strength but states it has improved overall.  His strength per objective findings has improved compared to last assessment.  Recommending continue PT with emphasis on mobility to attain full ROM for improved ability to complete IADLs such as putting way dishes in cabinets, washing his back using his RUE, and lifting overhead.     Recommendations: Continue as planned  Timeframe: 6 weeks  Frequency: 2 per week  Duration in visits: 12  Planned therapy and modality interventions: therapeutic exercise, neuromuscular re-education, manual therapy, and therapeutic activity  Prognosis to achieve goals: good    PT Signature: Paz Milian PT  [place] License # [id number]  Electronically signed by Paz Milian PT, 08/22/23, 1:50 PM EDT    Based upon review of the patient's progress and continued therapy plan, it is my medical opinion that Maninder Edwards should continue physical therapy treatment at [facility] PHYSICAL THERAPY  [address] [facility] [address] [place] [address] [phone].      Timed:         Manual Therapy:    15     mins  81844;     Therapeutic Exercise:    15     mins  87162;     Neuromuscular Astrid:        mins  50244;    Therapeutic Activity:          mins  88426;     Gait Training:           mins  09262;     Ultrasound:          mins  53915;    Ionto                                    mins   97187  Self Care                            mins   49515    Un-Timed:  Electrical Stimulation:         mins  41406 ( );  Dry Needling          mins 52083; 20561  Traction          mins 64599      Timed Treatment:   30   mins   Total Treatment:     30   mins

## 2023-08-24 ENCOUNTER — TREATMENT (OUTPATIENT)
Dept: PHYSICAL THERAPY | Facility: CLINIC | Age: 58
End: 2023-08-24
Payer: OTHER GOVERNMENT

## 2023-08-24 DIAGNOSIS — M25.511 ACUTE PAIN OF RIGHT SHOULDER: ICD-10-CM

## 2023-08-24 DIAGNOSIS — M25.619 LIMITED RANGE OF MOTION (ROM) OF SHOULDER: ICD-10-CM

## 2023-08-24 DIAGNOSIS — Z96.611 STATUS POST TOTAL REPLACEMENT OF RIGHT SHOULDER: Primary | ICD-10-CM

## 2023-08-24 PROCEDURE — 97140 MANUAL THERAPY 1/> REGIONS: CPT | Performed by: PHYSICAL THERAPIST

## 2023-08-24 PROCEDURE — 97110 THERAPEUTIC EXERCISES: CPT | Performed by: PHYSICAL THERAPIST

## 2023-08-24 NOTE — PROGRESS NOTES
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    Objective   See Exercise, Manual, and Modality Logs for complete treatment.     Assessment/Plan  Pt. Tolerates treatment well this visit and continues to show increasing mobility as strength increases.    Progress strengthening /stabilization /functional activity           Timed:         Manual Therapy:    10     mins  59887;     Therapeutic Exercise:    20     mins  39217;         Timed Treatment:   30   mins   Total Treatment:     30   mins    Nydia Figueroa PTA  Physical Therapist Assistant License #[id number]

## 2023-08-29 ENCOUNTER — TREATMENT (OUTPATIENT)
Dept: PHYSICAL THERAPY | Facility: CLINIC | Age: 58
End: 2023-08-29
Payer: OTHER GOVERNMENT

## 2023-08-29 DIAGNOSIS — M25.511 ACUTE PAIN OF RIGHT SHOULDER: ICD-10-CM

## 2023-08-29 DIAGNOSIS — M25.619 LIMITED RANGE OF MOTION (ROM) OF SHOULDER: ICD-10-CM

## 2023-08-29 DIAGNOSIS — Z96.611 STATUS POST TOTAL REPLACEMENT OF RIGHT SHOULDER: Primary | ICD-10-CM

## 2023-08-29 NOTE — PROGRESS NOTES
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]   Problems Addressed this Visit    None  Visit Diagnoses       Status post total replacement of right shoulder    -  Primary    Acute pain of right shoulder        Limited range of motion (ROM) of shoulder              Diagnoses         Codes Comments    Status post total replacement of right shoulder    -  Primary ICD-10-CM: Z96.611  ICD-9-CM: V43.61     Acute pain of right shoulder     ICD-10-CM: M25.511  ICD-9-CM: 719.41     Limited range of motion (ROM) of shoulder     ICD-10-CM: M25.619  ICD-9-CM: 719.51            Referring practitioner: Hong Herndon, *  Date of Initial Visit: Type: THERAPY  Noted: 2023  Today's Date: 2023    VISIT#: 38    Subjective Patient reports his shoulder is feeling pretty good but does still note tightness with overhead movements.      Objective     See Exercise, Manual, and Modality Logs for complete treatment.     Assessment/Plan Patient continues to demonstrate tightness into flexion and abduction ROM with a firm end feel noted but gradual gains continue to be noted with external rotation.  Continued with overhead strengthening this date with improved ROM noted with overhead press compared to previous sessions.  Continue to progress mobility and strength as tolerated.    Progress per Plan of Care and Progress strengthening /stabilization /functional activity         Timed:         Manual Therapy:    15     mins  22280;     Therapeutic Exercise:    10     mins  33932;     Neuromuscular Astrid:        mins  03232;    Therapeutic Activity:     13     mins  89567;     Gait Training:           mins  89152;     Ultrasound:          mins  36114;    Ionto                                   mins   39318    Un-Timed:  Electrical Stimulation:         mins  74584  ( );  Dry Needling          mins self-pay 20560; 20561  Traction          mins 79019      Timed Treatment:   38   mins   Total Treatment:     38   mins    Paz Milian PT  [place] License # [id number]  Physical Therapist

## 2023-08-31 ENCOUNTER — TREATMENT (OUTPATIENT)
Dept: PHYSICAL THERAPY | Facility: CLINIC | Age: 58
End: 2023-08-31
Payer: OTHER GOVERNMENT

## 2023-08-31 DIAGNOSIS — M25.511 ACUTE PAIN OF RIGHT SHOULDER: ICD-10-CM

## 2023-08-31 DIAGNOSIS — Z96.611 STATUS POST TOTAL REPLACEMENT OF RIGHT SHOULDER: Primary | ICD-10-CM

## 2023-08-31 NOTE — PROGRESS NOTES
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]   Problems Addressed this Visit    None  Visit Diagnoses       Status post total replacement of right shoulder    -  Primary    Acute pain of right shoulder              Diagnoses         Codes Comments    Status post total replacement of right shoulder    -  Primary ICD-10-CM: Z96.611  ICD-9-CM: V43.61     Acute pain of right shoulder     ICD-10-CM: M25.511  ICD-9-CM: 719.41            Referring practitioner: Hong Herndon, *  Date of Initial Visit: Type: THERAPY  Noted: 2023  Today's Date: 2023    VISIT#: 39    Subjective Patient reports his shoulder was a little sore after his last PT session but denies any pain today.  He reports his HEP is going well.      Objective     See Exercise, Manual, and Modality Logs for complete treatment.     Assessment/Plan Progressed R shoulder PROM and joint mobilizations for improved mobility and function of RUE.  Patient reported tightness with PROM with flexion, abduction, and IR but no discomfort noted with ER.  Added joint mobilizations for improved posterior roll of humeral head with flexion and distraction/inferior glides for improved abduction.  He reported no increased pain with activity today.    Progress per Plan of Care and Progress strengthening /stabilization /functional activity         Timed:         Manual Therapy:    25     mins  53259;     Therapeutic Exercise:         mins  24316;     Neuromuscular Astrid:        mins  56955;    Therapeutic Activity:     15     mins  94336;     Gait Training:           mins  50808;     Ultrasound:          mins  35631;    Ionto                                  mins   54468    Un-Timed:  Electrical Stimulation:         mins  73867 ( );  Dry Needling          mins self-pay ;  [address]  [facility] [address]      Timed Treatment:   40   mins   Total Treatment:     40   mins    Paz Milian PT  [place] License # [id number]  Physical Therapist

## 2023-09-01 ENCOUNTER — TELEPHONE (OUTPATIENT)
Dept: ORTHOPEDIC SURGERY | Facility: CLINIC | Age: 58
End: 2023-09-01

## 2023-09-01 NOTE — TELEPHONE ENCOUNTER
Provider: DR FENTON   Caller: HARINDER THOMASEN   Phone Number: [phone] (home)     Reason for Call: PATIENT MISTAKENLY MISSED HIS APPT ON 8/21/23 AND HAD TO RESCHEDULE FOR 10/12/23.   PATIENT IS WANTING TO SEE IF THERE IS ANY WAY HE CAN BE WORKED IN SOONER, AS HIS PHYSICAL THERAPY ENDS BEFORE THAT AND HE FEELS THAT HE NEEDS TO CONTINUE AS HE IS STILL FEELING VERY STIFF.   PLEASE ADVISE.

## 2023-09-06 ENCOUNTER — TREATMENT (OUTPATIENT)
Dept: PHYSICAL THERAPY | Facility: CLINIC | Age: 58
End: 2023-09-06
Payer: OTHER GOVERNMENT

## 2023-09-06 DIAGNOSIS — M25.511 ACUTE PAIN OF RIGHT SHOULDER: ICD-10-CM

## 2023-09-06 DIAGNOSIS — M25.619 LIMITED RANGE OF MOTION (ROM) OF SHOULDER: ICD-10-CM

## 2023-09-06 DIAGNOSIS — Z96.611 STATUS POST TOTAL REPLACEMENT OF RIGHT SHOULDER: Primary | ICD-10-CM

## 2023-09-06 PROCEDURE — 97110 THERAPEUTIC EXERCISES: CPT | Performed by: PHYSICAL THERAPIST

## 2023-09-06 PROCEDURE — 97112 NEUROMUSCULAR REEDUCATION: CPT | Performed by: PHYSICAL THERAPIST

## 2023-09-06 PROCEDURE — 97140 MANUAL THERAPY 1/> REGIONS: CPT | Performed by: PHYSICAL THERAPIST

## 2023-09-06 NOTE — PROGRESS NOTES
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 Pt. States his overhead mobility improves as well but ER continues to be a bit difficult actively.    Objective   See Exercise, Manual, and Modality Logs for complete treatment.     Assessment/Plan  Pt. Tolerates treatment very well and does well to relax for PROM this date. Pt. Shoulder stability is becoming much better as dynamic movements are challenged.     STG:  -Patient will be independent with initial HEP in 3 weeks. MET  -Patient will demonstrate increased R shoulder external rotation PROM to 35 degrees in 4 weeks. MET  -Patient will demonstrate improved PROM of R shoulder flexion to 120 degrees and abduction to 90 degrees for progression of protocol in 4 weeks. MET  -Patient will report a reduction in R shoulder pain by >/=25% for improved sleep pattern in 4 weeks. MET    LTG:  -Patient will demonstrate increased R shoulder flexion and abduction to WFL for improved ability to reach overhead in 12 weeks. NOT MET, progressing  -Patient will demonstrate increased strength of R shoulder to grossly 4+ to 5/5 for ability to complete household tasks independently in 12 weeks. PARTIALLY MET, progressing  -Patient will be able to return to exercise without complaints of shoulder pain in 12 weeks. NOT MET, Progressing  -Patient will report min to no R shoulder pain for improved ability to perform IADLs in 12 weeks. MET  -Patient will demonstrate increased AROM of R shoulder IR to 70 degrees and ER to 60 degrees for independence with dressing such as pulling up  pants and donning jacket in 12 weeks NOT MET, progressing   Progress toward previous goals: Partially Met    Progress strengthening /stabilization /functional activity           Timed:         Manual Therapy:    10     mins  54946;     Therapeutic Exercise:    10     mins  86225;     Neuromuscular Astrid:    10    mins  21558;      Timed Treatment:  30    mins   Total Treatment:     30   mins    Nydia Figueroa PTA  Physical Therapist Assistant License #[id number]

## 2023-09-11 ENCOUNTER — TREATMENT (OUTPATIENT)
Dept: PHYSICAL THERAPY | Facility: CLINIC | Age: 58
End: 2023-09-11
Payer: OTHER GOVERNMENT

## 2023-09-11 DIAGNOSIS — M25.619 LIMITED RANGE OF MOTION (ROM) OF SHOULDER: ICD-10-CM

## 2023-09-11 DIAGNOSIS — Z96.611 STATUS POST TOTAL REPLACEMENT OF RIGHT SHOULDER: Primary | ICD-10-CM

## 2023-09-11 DIAGNOSIS — M25.511 ACUTE PAIN OF RIGHT SHOULDER: ICD-10-CM

## 2023-09-11 PROCEDURE — 97140 MANUAL THERAPY 1/> REGIONS: CPT | Performed by: PHYSICAL THERAPIST

## 2023-09-11 PROCEDURE — 97112 NEUROMUSCULAR REEDUCATION: CPT | Performed by: PHYSICAL THERAPIST

## 2023-09-11 PROCEDURE — 97110 THERAPEUTIC EXERCISES: CPT | Performed by: PHYSICAL THERAPIST

## 2023-09-11 NOTE — PROGRESS NOTES
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    Objective   See Exercise, Manual, and Modality Logs for complete treatment.     Assessment/Plan  Pt. did well this date increasing some weights as well as introducing paloff press for shoulder and core stability training to assist with greater lever arm needs. Pt. Uses good form throughout session and has no complaints of pain.    STG:  -Patient will be independent with initial HEP in 3 weeks. MET  -Patient will demonstrate increased R shoulder external rotation PROM to 35 degrees in 4 weeks. MET  -Patient will demonstrate improved PROM of R shoulder flexion to 120 degrees and abduction to 90 degrees for progression of protocol in 4 weeks. MET  -Patient will report a reduction in R shoulder pain by >/=25% for improved sleep pattern in 4 weeks. MET    LTG:  -Patient will demonstrate increased R shoulder flexion and abduction to WFL for improved ability to reach overhead in 12 weeks. NOT MET, progressing  -Patient will demonstrate increased strength of R shoulder to grossly 4+ to 5/5 for ability to complete household tasks independently in 12 weeks. PARTIALLY MET, progressing  -Patient will be able to return to exercise without complaints of shoulder pain in 12 weeks. NOT MET, Progressing  -Patient will report min to no R shoulder pain for improved ability to perform IADLs in 12 weeks. MET  -Patient will demonstrate increased AROM of  R shoulder IR to 70 degrees and ER to 60 degrees for independence with dressing such as pulling up pants and donning jacket in 12 weeks NOT MET, progressing   Progress toward previous goals: Partially Met    Progress strengthening /stabilization /functional activity           Timed:         Manual Therapy:    15     mins  62663;     Therapeutic Exercise:    15     mins  71000;     Neuromuscular Astrid:    15    mins  00760;      Timed Treatment:   45   mins   Total Treatment:     45   mins    Nydia Figueroa PTA  Physical Therapist Assistant License #[id number]

## 2023-09-14 ENCOUNTER — OFFICE VISIT (OUTPATIENT)
Dept: ORTHOPEDIC SURGERY | Facility: CLINIC | Age: 58
End: 2023-09-14
Payer: OTHER GOVERNMENT

## 2023-09-14 ENCOUNTER — TREATMENT (OUTPATIENT)
Dept: PHYSICAL THERAPY | Facility: CLINIC | Age: 58
End: 2023-09-14
Payer: OTHER GOVERNMENT

## 2023-09-14 VITALS — BODY MASS INDEX: 19.55 KG/M2 | HEIGHT: 68 IN | OXYGEN SATURATION: 99 % | HEART RATE: 67 BPM | WEIGHT: 129 LBS

## 2023-09-14 DIAGNOSIS — Z47.89 ORTHOPEDIC AFTERCARE: Primary | ICD-10-CM

## 2023-09-14 DIAGNOSIS — M19.011 OSTEOARTHRITIS OF RIGHT GLENOHUMERAL JOINT: ICD-10-CM

## 2023-09-14 DIAGNOSIS — M25.511 ACUTE PAIN OF RIGHT SHOULDER: ICD-10-CM

## 2023-09-14 DIAGNOSIS — M25.619 LIMITED RANGE OF MOTION (ROM) OF SHOULDER: ICD-10-CM

## 2023-09-14 DIAGNOSIS — Z96.611 STATUS POST TOTAL REPLACEMENT OF RIGHT SHOULDER: Primary | ICD-10-CM

## 2023-09-14 PROCEDURE — 97110 THERAPEUTIC EXERCISES: CPT | Performed by: PHYSICAL THERAPIST

## 2023-09-14 PROCEDURE — 97140 MANUAL THERAPY 1/> REGIONS: CPT | Performed by: PHYSICAL THERAPIST

## 2023-09-14 PROCEDURE — 99212 OFFICE O/P EST SF 10 MIN: CPT | Performed by: ORTHOPAEDIC SURGERY

## 2023-09-14 NOTE — PROGRESS NOTES
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    Objective   See Exercise, Manual, and Modality Logs for complete treatment.     Assessment/Plan  Pt. Tolerates treatment well this date with no pain to report on fatigue as sets are completed. Pt. Will benefit from continued increase of dynamic shoulder strengthening for return to PLOF and more leisure freedom.    STG:  -Patient will be independent with initial HEP in 3 weeks. MET  -Patient will demonstrate increased R shoulder external rotation PROM to 35 degrees in 4 weeks. MET  -Patient will demonstrate improved PROM of R shoulder flexion to 120 degrees and abduction to 90 degrees for progression of protocol in 4 weeks. MET  -Patient will report a reduction in R shoulder pain by >/=25% for improved sleep pattern in 4 weeks. MET    LTG:  -Patient will demonstrate increased R shoulder flexion and abduction to WFL for improved ability to reach overhead in 12 weeks. NOT MET, progressing  -Patient will demonstrate increased strength of R shoulder to grossly 4+ to 5/5 for ability to complete household tasks independently in 12 weeks. PARTIALLY MET, progressing  -Patient will be able to return to exercise without complaints of shoulder pain in 12 weeks. NOT MET, Progressing  -Patient will report min to no R shoulder pain for improved ability to perform IADLs in 12 weeks. MET  -Patient will demonstrate increased AROM of R shoulder IR to 70 degrees and ER to 60 degrees for independence with dressing such as pulling up pants and donning jacket in 12 weeks  NOT MET, progressing   Progress toward previous goals: Partially Met    Progress strengthening /stabilization /functional activity           Timed:         Manual Therapy:    10     mins  48126;     Therapeutic Exercise:    20     mins  68850;       Timed Treatment:   30   mins   Total Treatment:     30   mins    Nydia Figueroa PTA  Physical Therapist Assistant License #[id number]

## 2023-09-14 NOTE — PROGRESS NOTES
"     Patient ID: Maninder Edwards is a 57 y.o. male.  2/15/23 right total shoulder arthroplasty   No pain  Review of Systems:        Objective:    Ht 172.7 cm (68\")   Wt 58.5 kg (129 lb)   BMI 19.61 kg/m²     Physical Examination:  Right shoulder passive elevation 160 abduction 140 external rotation 50 internal rotation L5 mild weakness on belly press       Imaging:   right Shoulder X-Ray  Indication: Postop total shoulder  AP Y and Lateral views  Findings: Total shoulder arthroplasty unchanged position  no bony lesion  Soft tissues normal  not examined joint spaces  Hardware appropriately positioned yes      yes prior studies available for comparison.    Assessment:    Doing well after shoulder arthroplasty    Plan:   Continue activity as tolerated x-ray in 6 months      Procedures          Disclaimer: Part of this note may be an electronic transcription/translation of spoken language to printed text using the Dragon Dictation System  "

## 2023-09-18 ENCOUNTER — TELEPHONE (OUTPATIENT)
Dept: FAMILY MEDICINE CLINIC | Facility: CLINIC | Age: 58
End: 2023-09-18
Payer: OTHER GOVERNMENT

## 2023-09-18 NOTE — TELEPHONE ENCOUNTER
Caller: Harinder Edwards    Relationship: Self    Best call back number: [phone]     What is the best time to reach you: ANY    Who are you requesting to speak with (clinical staff, provider,  specific staff member): ANY    What was the call regarding: PATIENT WOULD LIKE TO ASK DR. BEY IF HE WILL TAKE ON HIS SON 16 YEAR OLD HARINDER EDWARDS III AS A NEW PATIENT.     Is it okay if the provider responds through [employer]: NO

## 2023-09-19 ENCOUNTER — TREATMENT (OUTPATIENT)
Dept: PHYSICAL THERAPY | Facility: CLINIC | Age: 58
End: 2023-09-19
Payer: OTHER GOVERNMENT

## 2023-09-19 DIAGNOSIS — M25.511 ACUTE PAIN OF RIGHT SHOULDER: ICD-10-CM

## 2023-09-19 DIAGNOSIS — Z96.611 STATUS POST TOTAL REPLACEMENT OF RIGHT SHOULDER: Primary | ICD-10-CM

## 2023-09-19 DIAGNOSIS — M25.619 LIMITED RANGE OF MOTION (ROM) OF SHOULDER: ICD-10-CM

## 2023-09-19 PROCEDURE — 97112 NEUROMUSCULAR REEDUCATION: CPT | Performed by: PHYSICAL THERAPIST

## 2023-09-19 PROCEDURE — 97140 MANUAL THERAPY 1/> REGIONS: CPT | Performed by: PHYSICAL THERAPIST

## 2023-09-19 PROCEDURE — 97110 THERAPEUTIC EXERCISES: CPT | Performed by: PHYSICAL THERAPIST

## 2023-09-19 NOTE — PROGRESS NOTES
Physical Therapy Progress Note/Reassessment  [address] [place] [address] [address]. 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 Status post total replacement of right shoulder  Z96.611 V43.61   2. Acute pain of right shoulder  M25.511 719.41   3. Limited range of motion (ROM) of shoulder  M25.619 719.51         Maninder Edwards reports he continues to improve he states he still has some tightness but continues to stretch into end ranges as much as possible to encourage more mobility. Pt. States certain lifting still bothers him as well as weight bearing through UE's causes soreness more than he'd expect at this point but that is improving as he does knee push ups to improve strength.  Subjective Questionnaire: QuickDASH: 34%  Clinical Progress: improved  Home Program Compliance: Yes  Treatment has included: therapeutic exercise, neuromuscular re-education, manual therapy, and therapeutic activity    Objective     R Shoulder AROM Flex 0-165 deg Abd 0-155 deg ER 0-65 deg IR 0-70 deg  R shoulder MMT 5/5 grossly except ER 4+/5    Assessment/Plan  Pt. shows progress in measurable strength and mobility this date Pt. is making progress toward overall return to PLOF meeting 2 long term goals since last progress note however still needing further progress to complete remaining goals primarily in pain with active use and overall overhead mobility. Pt will continue to benefit from treatment to target UE weight bearing and overhead stability.    STG:  -Patient will be independent with initial HEP in 3 weeks. MET  -Patient will demonstrate increased R shoulder external rotation PROM to 35 degrees in 4 weeks. MET  -Patient will demonstrate  improved PROM of R shoulder flexion to 120 degrees and abduction to 90 degrees for progression of protocol in 4 weeks. MET  -Patient will report a reduction in R shoulder pain by >/=25% for improved sleep pattern in 4 weeks. MET    LTG:  -Patient will demonstrate increased R shoulder flexion and abduction to WFL for improved ability to reach overhead in 12 weeks. NOT MET, progressing  -Patient will demonstrate increased strength of R shoulder to grossly 4+ to 5/5 for ability to complete household tasks independently in 12 weeks. MET  -Patient will be able to return to exercise without complaints of shoulder pain in 12 weeks. NOT MET, Progressing  -Patient will report min to no R shoulder pain for improved ability to perform IADLs in 12 weeks. MET  -Patient will demonstrate increased AROM of R shoulder IR to 70 degrees and ER to 60 degrees for independence with dressing such as pulling up pants and donning jacket in 12 weeks MET  Progress toward previous goals: Partially Met     Recommendations: Continue as planned  Timeframe: 6 weeks  Prognosis to achieve goals: good      Timed:         Manual Therapy:    15     mins  85930;     Therapeutic Exercise:    15     mins  23772;     Neuromuscular Astrid:    10    mins  23820;      Timed Treatment:   40   mins   Total Treatment:     40   mins    PT Signature: Nydia Figueroa PTA  [place] License: #[id number]

## 2023-09-26 ENCOUNTER — TREATMENT (OUTPATIENT)
Dept: PHYSICAL THERAPY | Facility: CLINIC | Age: 58
End: 2023-09-26
Payer: OTHER GOVERNMENT

## 2023-09-26 DIAGNOSIS — M25.511 ACUTE PAIN OF RIGHT SHOULDER: ICD-10-CM

## 2023-09-26 DIAGNOSIS — M25.619 LIMITED RANGE OF MOTION (ROM) OF SHOULDER: ICD-10-CM

## 2023-09-26 DIAGNOSIS — Z96.611 STATUS POST TOTAL REPLACEMENT OF RIGHT SHOULDER: Primary | ICD-10-CM

## 2023-09-26 PROCEDURE — 97140 MANUAL THERAPY 1/> REGIONS: CPT | Performed by: PHYSICAL THERAPIST

## 2023-09-26 PROCEDURE — 97110 THERAPEUTIC EXERCISES: CPT | Performed by: PHYSICAL THERAPIST

## 2023-09-26 NOTE — PROGRESS NOTES
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]   Problems Addressed this Visit    None  Visit Diagnoses       Status post total replacement of right shoulder    -  Primary    Acute pain of right shoulder        Limited range of motion (ROM) of shoulder              Diagnoses         Codes Comments    Status post total replacement of right shoulder    -  Primary ICD-10-CM: Z96.611  ICD-9-CM: V43.61     Acute pain of right shoulder     ICD-10-CM: M25.511  ICD-9-CM: 719.41     Limited range of motion (ROM) of shoulder     ICD-10-CM: M25.619  ICD-9-CM: 719.51            Referring practitioner: Hong Herndon, *  Date of Initial Visit: Type: THERAPY  Noted: 2023  Today's Date: 2023    VISIT#: 44    Subjective Patient reports his shoulder has been feeling good except continued difficulty reaching behind his back and overhead.        Objective     See Exercise, Manual, and Modality Logs for complete treatment.     Assessment/Plan Focused on R glenohumeral joints mobilizations and PROM for improved ability to perform overhead activities and reaching behind the back.  Also performed pec stretch due to tightness at pectoralis musculature which limits his overhead mobility.  Continue to progress scapular and glenohumeral mobility for improved ability to perform overhead activities and reach behind his back.    Progress per Plan of Care and Progress strengthening /stabilization /functional activity         Timed:         Manual Therapy:    20     mins  28159;     Therapeutic Exercise:    15     mins  82822;     Neuromuscular Astrid:        mins  22762;    Therapeutic Activity:          mins  00361;     Gait Training:           mins  55030;     Ultrasound:          mins  48795;    Ionto                                   mins    28769    Un-Timed:  Electrical Stimulation:         mins  51634 ( );  Dry Needling          mins self-pay 20560; 20561  Traction          mins 33735      Timed Treatment:   35   mins   Total Treatment:     35   mins    Paz Milian PT  [place] License # [id number]  Physical Therapist

## 2023-09-28 ENCOUNTER — TREATMENT (OUTPATIENT)
Dept: PHYSICAL THERAPY | Facility: CLINIC | Age: 58
End: 2023-09-28
Payer: OTHER GOVERNMENT

## 2023-09-28 DIAGNOSIS — Z96.611 STATUS POST TOTAL REPLACEMENT OF RIGHT SHOULDER: Primary | ICD-10-CM

## 2023-09-28 DIAGNOSIS — M25.619 LIMITED RANGE OF MOTION (ROM) OF SHOULDER: ICD-10-CM

## 2023-09-28 DIAGNOSIS — M25.511 ACUTE PAIN OF RIGHT SHOULDER: ICD-10-CM

## 2023-09-28 PROCEDURE — 97110 THERAPEUTIC EXERCISES: CPT | Performed by: PHYSICAL THERAPIST

## 2023-09-28 PROCEDURE — 97140 MANUAL THERAPY 1/> REGIONS: CPT | Performed by: PHYSICAL THERAPIST

## 2023-09-28 NOTE — PROGRESS NOTES
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    Objective   See Exercise, Manual, and Modality Logs for complete treatment.     Assessment/Plan  Pt. Is progressing well at this time and maintains good shoulder mechanics with activity throughout and does well to relax for overpressure into stretches Pt. Dynamic stability is improving notable during  over head paloff press and ER movements this date showing signs of overall strength gains.    STG:  -Patient will be independent with initial HEP in 3 weeks. MET  -Patient will demonstrate increased R shoulder external rotation PROM to 35 degrees in 4 weeks. MET  -Patient will demonstrate improved PROM of R shoulder flexion to 120 degrees and abduction to 90 degrees for progression of protocol in 4 weeks. MET  -Patient will report a reduction in R shoulder pain by >/=25% for improved sleep pattern in 4 weeks. MET    LTG:  -Patient will demonstrate increased R shoulder flexion and abduction to WFL for improved ability to reach overhead in 12 weeks. NOT MET, progressing  -Patient will demonstrate increased strength of R shoulder to grossly 4+ to 5/5 for ability to complete household tasks independently in 12 weeks. MET  -Patient will be able to return to exercise without complaints of shoulder pain in 12 weeks. NOT MET, Progressing  -Patient will report min to no R shoulder pain for improved ability to perform IADLs in 12 weeks. MET  -Patient will demonstrate  increased AROM of R shoulder IR to 70 degrees and ER to 60 degrees for independence with dressing such as pulling up pants and donning jacket in 12 weeks MET  Progress toward previous goals: Partially Met    Progress strengthening /stabilization /functional activity           Timed:         Manual Therapy:    15     mins  19254;     Therapeutic Exercise:    15     mins  78376;         Timed Treatment:   30   mins   Total Treatment:     30   mins    Nydia Figueroa PTA  Physical Therapist Assistant License #[id number]

## 2023-10-17 ENCOUNTER — TREATMENT (OUTPATIENT)
Dept: PHYSICAL THERAPY | Facility: CLINIC | Age: 58
End: 2023-10-17
Payer: OTHER GOVERNMENT

## 2023-10-17 DIAGNOSIS — Z96.611 STATUS POST TOTAL REPLACEMENT OF RIGHT SHOULDER: Primary | ICD-10-CM

## 2023-10-17 DIAGNOSIS — M25.511 ACUTE PAIN OF RIGHT SHOULDER: ICD-10-CM

## 2023-10-17 DIAGNOSIS — M25.619 LIMITED RANGE OF MOTION (ROM) OF SHOULDER: ICD-10-CM

## 2023-10-17 PROCEDURE — 97110 THERAPEUTIC EXERCISES: CPT | Performed by: PHYSICAL THERAPIST

## 2023-10-17 PROCEDURE — 97140 MANUAL THERAPY 1/> REGIONS: CPT | Performed by: PHYSICAL THERAPIST

## 2023-10-17 NOTE — PROGRESS NOTES
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    Objective   See Exercise, Manual, and Modality Logs for complete treatment.     Assessment/Plan  P.t was unable to complete cable column activities today due to weakness and onset of pain with pulling with L UE. Pt. Completed dowel and lightweight activities this date holding resistance exercise focusing on ROM activity. Pt. Also responded well to manual intervention of UT and PROM for relief of some soreness.    STG:  -Patient will be independent with initial HEP in 3 weeks. MET  -Patient will demonstrate increased R shoulder external rotation PROM to 35 degrees in 4 weeks. MET  -Patient will demonstrate improved PROM of R shoulder flexion to 120 degrees and abduction to 90 degrees for progression of protocol in 4 weeks. MET  -Patient will report a reduction in R shoulder pain by >/=25% for improved sleep pattern in 4 weeks. MET    LTG:  -Patient will demonstrate increased R shoulder flexion and abduction to WFL for improved ability to reach overhead in 12 weeks. NOT MET, progressing  -Patient will demonstrate increased strength of R shoulder to grossly 4+ to 5/5 for ability to complete household tasks independently in 12 weeks. MET  -Patient will be able to return to exercise without complaints of shoulder pain in 12 weeks. NOT MET, Progressing  -Patient will report min to no R shoulder pain for improved ability to perform IADLs in 12 weeks. MET  -Patient will demonstrate increased AROM of R shoulder IR to 70 degrees and ER  to 60 degrees for independence with dressing such as pulling up pants and donning jacket in 12 weeks MET  Progress toward previous goals: Partially Met    Progress strengthening /stabilization /functional activity           Timed:         Manual Therapy:    15     mins  15403;     Therapeutic Exercise:    15     mins  47583;         Timed Treatment:   30   mins   Total Treatment:     30   mins    Nydia Figueroa PTA  Physical Therapist Assistant License #[id number]

## 2023-10-19 ENCOUNTER — TREATMENT (OUTPATIENT)
Dept: PHYSICAL THERAPY | Facility: CLINIC | Age: 58
End: 2023-10-19
Payer: OTHER GOVERNMENT

## 2023-10-19 DIAGNOSIS — M25.511 ACUTE PAIN OF RIGHT SHOULDER: ICD-10-CM

## 2023-10-19 DIAGNOSIS — M25.619 LIMITED RANGE OF MOTION (ROM) OF SHOULDER: ICD-10-CM

## 2023-10-19 DIAGNOSIS — Z96.611 STATUS POST TOTAL REPLACEMENT OF RIGHT SHOULDER: Primary | ICD-10-CM

## 2023-10-19 NOTE — PROGRESS NOTES
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]   Problems Addressed this Visit    None  Visit Diagnoses       Status post total replacement of right shoulder    -  Primary    Acute pain of right shoulder        Limited range of motion (ROM) of shoulder              Diagnoses         Codes Comments    Status post total replacement of right shoulder    -  Primary ICD-10-CM: Z96.611  ICD-9-CM: V43.61     Acute pain of right shoulder     ICD-10-CM: M25.511  ICD-9-CM: 719.41     Limited range of motion (ROM) of shoulder     ICD-10-CM: M25.619  ICD-9-CM: 719.51            Referring practitioner: Hong Herndon, *  Date of Initial Visit: Type: THERAPY  Noted: 2023  Today's Date: 10/19/2023    VISIT#: 47    Subjective Patient reports his L shoulder continues to feel weak and painful.  Patient reports his R shoulder is feeling good today without any pain.        Objective     See Exercise, Manual, and Modality Logs for complete treatment.     Assessment/Plan Patient with no complaints of R shoulder pain with exercise but tightness noted into R shoulder with PROM with overpressure this date.  Pulldowns and resisted internal rotation had to be decreased at this time due to weakness and pain in the L shoulder with exercise today.  Patient encouraged to perform all exercises at home on the LUE as well due to current limitations.  Patient with positive testing for L subscapularis and pain with resisted internal rotation and extension of the shoulder.  Patient to call surgeon for consult concerning L shoulder if pain or strength worsens.    Progress per Plan of Care and Progress strengthening /stabilization /functional activity         Timed:         Manual Therapy:    15     mins  43128;     Therapeutic Exercise:         mins  10484;      Neuromuscular Astrid:        mins  21682;    Therapeutic Activity:     20     mins  20250;     Gait Training:           mins  38170;     Ultrasound:          mins  51564;    Ionto                                   mins   18237    Un-Timed:  Electrical Stimulation:         mins  94024 ( );  Dry Needling          mins self-pay 20560; 20561  Traction          mins 19663      Timed Treatment:   35   mins   Total Treatment:     35   mins    Paz Milian PT  [place] License # [id number]  Physical Therapist

## 2023-10-24 ENCOUNTER — TREATMENT (OUTPATIENT)
Dept: PHYSICAL THERAPY | Facility: CLINIC | Age: 58
End: 2023-10-24
Payer: OTHER GOVERNMENT

## 2023-10-24 DIAGNOSIS — Z96.611 STATUS POST TOTAL REPLACEMENT OF RIGHT SHOULDER: Primary | ICD-10-CM

## 2023-10-24 DIAGNOSIS — M25.619 LIMITED RANGE OF MOTION (ROM) OF SHOULDER: ICD-10-CM

## 2023-10-24 DIAGNOSIS — M25.511 ACUTE PAIN OF RIGHT SHOULDER: ICD-10-CM

## 2023-10-24 PROCEDURE — 97140 MANUAL THERAPY 1/> REGIONS: CPT | Performed by: PHYSICAL THERAPIST

## 2023-10-24 PROCEDURE — 97110 THERAPEUTIC EXERCISES: CPT | Performed by: PHYSICAL THERAPIST

## 2023-10-24 NOTE — PROGRESS NOTES
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    Objective   See Exercise, Manual, and Modality Logs for complete treatment.     Assessment/Plan  Pt. Tolerates treatment well this date subtly increasing activity as tolerable. Pt. Exercise tolerance is still reduced since reported incident with L UE. Pt. ROM is preserved and stretching does not cause increased pain.    STG:  -Patient will be independent with initial HEP in 3 weeks. MET  -Patient will demonstrate increased R shoulder external rotation PROM to 35 degrees in 4 weeks. MET  -Patient will demonstrate improved PROM of R shoulder flexion to 120 degrees and abduction to 90 degrees for progression of protocol in 4 weeks. MET  -Patient will report a reduction in R shoulder pain by >/=25% for improved sleep pattern in 4 weeks. MET    LTG:  -Patient will demonstrate increased R shoulder flexion and abduction to WFL for improved ability to reach overhead in 12 weeks. NOT MET, progressing  -Patient will demonstrate increased strength of R shoulder to grossly 4+ to 5/5 for ability to complete household tasks independently in 12 weeks. MET  -Patient will be able to return to exercise without complaints of shoulder pain in 12 weeks. NOT MET, Progressing  -Patient will report min to no R shoulder pain for improved ability to perform IADLs in 12 weeks. MET  -Patient will demonstrate increased AROM of R shoulder IR to 70 degrees and ER to 60 degrees for independence with dressing such as pulling up pants and  donning jacket in 12 weeks MET  Progress toward previous goals: Partially Met    Progress strengthening /stabilization /functional activity           Timed:         Manual Therapy:    15     mins  29671;     Therapeutic Exercise:    15     mins  59034;         Timed Treatment:   30   mins   Total Treatment:     30   mins    Nydia Figueroa PTA  Physical Therapist Assistant License #[id number]

## 2023-10-26 ENCOUNTER — TREATMENT (OUTPATIENT)
Dept: PHYSICAL THERAPY | Facility: CLINIC | Age: 58
End: 2023-10-26
Payer: OTHER GOVERNMENT

## 2023-10-26 DIAGNOSIS — M25.619 LIMITED RANGE OF MOTION (ROM) OF SHOULDER: ICD-10-CM

## 2023-10-26 DIAGNOSIS — M25.511 ACUTE PAIN OF RIGHT SHOULDER: ICD-10-CM

## 2023-10-26 DIAGNOSIS — Z96.611 STATUS POST TOTAL REPLACEMENT OF RIGHT SHOULDER: Primary | ICD-10-CM

## 2023-10-26 PROCEDURE — 97110 THERAPEUTIC EXERCISES: CPT | Performed by: PHYSICAL THERAPIST

## 2023-10-26 PROCEDURE — 97140 MANUAL THERAPY 1/> REGIONS: CPT | Performed by: PHYSICAL THERAPIST

## 2023-10-26 NOTE — PROGRESS NOTES
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]   Problems Addressed this Visit    None  Visit Diagnoses       Status post total replacement of right shoulder    -  Primary    Acute pain of right shoulder        Limited range of motion (ROM) of shoulder              Diagnoses         Codes Comments    Status post total replacement of right shoulder    -  Primary ICD-10-CM: Z96.611  ICD-9-CM: V43.61     Acute pain of right shoulder     ICD-10-CM: M25.511  ICD-9-CM: 719.41     Limited range of motion (ROM) of shoulder     ICD-10-CM: M25.619  ICD-9-CM: 719.51            Referring practitioner: Hong Herndon, *  Date of Initial Visit: Type: THERAPY  Noted: 2023  Today's Date: 10/26/2023    VISIT#: 49    Subjective Patient reports his shoulders have been feeling pretty good with no significant pain.  He reports his left shoulder seems to be slowly getting stronger.      Objective     See Exercise, Manual, and Modality Logs for complete treatment.     Assessment/Plan Patient continues to demonstrate difficulty with lat pulldowns and resisted internal rotation on the LUE but was able to increase resistance this date compared to last visit.  His ROM is progressing slowly at this time for his R shoulder but near full external rotation noted in 90 degrees abduction.  Limitations noted in flexion and abduction with approximately 150 degrees noted in each plane.    Progress per Plan of Care and Progress strengthening /stabilization /functional activity         Timed:         Manual Therapy:    15     mins  70716;     Therapeutic Exercise:    16     mins  56775;     Neuromuscular Astrid:        mins  27311;    Therapeutic Activity:          mins  31478;     Gait Training:           mins  42570;     Ultrasound:          mins  96261;    Ionto                                    mins   80592    Un-Timed:  Electrical Stimulation:         mins  61608 ( );  Dry Needling          mins self-pay 20560; 20561  Traction          mins 09623      Timed Treatment:   31   mins   Total Treatment:     31   mins    Paz Milian PT  [place] License # [id number]  Physical Therapist

## 2023-11-02 ENCOUNTER — TREATMENT (OUTPATIENT)
Dept: PHYSICAL THERAPY | Facility: CLINIC | Age: 58
End: 2023-11-02
Payer: OTHER GOVERNMENT

## 2023-11-02 DIAGNOSIS — Z96.611 STATUS POST TOTAL REPLACEMENT OF RIGHT SHOULDER: Primary | ICD-10-CM

## 2023-11-02 DIAGNOSIS — M25.619 LIMITED RANGE OF MOTION (ROM) OF SHOULDER: ICD-10-CM

## 2023-11-02 DIAGNOSIS — M25.511 ACUTE PAIN OF RIGHT SHOULDER: ICD-10-CM

## 2023-11-02 PROCEDURE — 97112 NEUROMUSCULAR REEDUCATION: CPT | Performed by: PHYSICAL THERAPIST

## 2023-11-02 PROCEDURE — 97110 THERAPEUTIC EXERCISES: CPT | Performed by: PHYSICAL THERAPIST

## 2023-11-02 PROCEDURE — 97140 MANUAL THERAPY 1/> REGIONS: CPT | Performed by: PHYSICAL THERAPIST

## 2023-11-02 NOTE — PROGRESS NOTES
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    Objective   See Exercise, Manual, and Modality Logs for complete treatment.     Assessment/Plan  Pt. tolerates exercise better this date and ROM is still WFL B. Pt. Is progressing well at this time and returning to baseline.     STG:  -Patient will be independent with initial HEP in 3 weeks. MET  -Patient will demonstrate increased R shoulder external rotation PROM to 35 degrees in 4 weeks. MET  -Patient will demonstrate improved PROM of R shoulder flexion to 120 degrees and abduction to 90 degrees for progression of protocol in 4 weeks. MET  -Patient will report a reduction in R shoulder pain by >/=25% for improved sleep pattern in 4 weeks. MET    LTG:  -Patient will demonstrate increased R shoulder flexion and abduction to WFL for improved ability to reach overhead in 12 weeks. NOT MET, progressing  -Patient will demonstrate increased strength of R shoulder to grossly 4+ to 5/5 for ability to complete household tasks independently in 12 weeks. MET  -Patient will be able to return to exercise without complaints of shoulder pain in 12 weeks. NOT MET, Progressing  -Patient will report min to no R shoulder pain for improved ability to perform IADLs in 12 weeks. MET  -Patient will demonstrate increased AROM of R shoulder IR to 70 degrees and ER to 60 degrees for independence with dressing such as pulling up pants and donning jacket in 12 weeks MET  Progress toward previous goals: Partially  Met    Progress strengthening /stabilization /functional activity           Timed:         Manual Therapy:    15     mins  89641;     Therapeutic Exercise:    15     mins  49069;     Neuromuscular Astrid:   15     mins  98229;        Timed Treatment:   45   mins   Total Treatment:     45   mins    Nydia Figueroa PTA  Physical Therapist Assistant License #[id number]

## 2023-11-07 ENCOUNTER — TREATMENT (OUTPATIENT)
Dept: PHYSICAL THERAPY | Facility: CLINIC | Age: 58
End: 2023-11-07
Payer: OTHER GOVERNMENT

## 2023-11-07 DIAGNOSIS — Z96.611 STATUS POST TOTAL REPLACEMENT OF RIGHT SHOULDER: Primary | ICD-10-CM

## 2023-11-07 DIAGNOSIS — M25.511 ACUTE PAIN OF RIGHT SHOULDER: ICD-10-CM

## 2023-11-07 DIAGNOSIS — M25.619 LIMITED RANGE OF MOTION (ROM) OF SHOULDER: ICD-10-CM

## 2023-11-07 PROCEDURE — 97530 THERAPEUTIC ACTIVITIES: CPT | Performed by: PHYSICAL THERAPIST

## 2023-11-07 PROCEDURE — 97110 THERAPEUTIC EXERCISES: CPT | Performed by: PHYSICAL THERAPIST

## 2023-11-07 PROCEDURE — 97140 MANUAL THERAPY 1/> REGIONS: CPT | Performed by: PHYSICAL THERAPIST

## 2023-11-07 NOTE — PROGRESS NOTES
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]   Problems Addressed this Visit    None  Visit Diagnoses       Status post total replacement of right shoulder    -  Primary    Acute pain of right shoulder        Limited range of motion (ROM) of shoulder              Diagnoses         Codes Comments    Status post total replacement of right shoulder    -  Primary ICD-10-CM: Z96.611  ICD-9-CM: V43.61     Acute pain of right shoulder     ICD-10-CM: M25.511  ICD-9-CM: 719.41     Limited range of motion (ROM) of shoulder     ICD-10-CM: M25.619  ICD-9-CM: 719.51            Referring practitioner: Hong Herndon, *  Date of Initial Visit: Type: THERAPY  Noted: 2023  Today's Date: 2023    VISIT#: 51    Subjective Patient reports his left shoulder seems to be gradually getting stronger.  Patient denies any pain in his shoulder today.      Objective     See Exercise, Manual, and Modality Logs for complete treatment.     Assessment/Plan Patient with tightness continued into his shoulder with extension, elevation, and internal rotation but gradual improvements noted.  Patient was able to progress strengthening this date without complaints of shoulder pain or compensations noted at L shoulder.  Patient with no complaints at end of session.     Progress per Plan of Care and Progress strengthening /stabilization /functional activity         Timed:         Manual Therapy:    15     mins  21817;     Therapeutic Exercise:    15     mins  75487;     Neuromuscular Astrid:        mins  85407;    Therapeutic Activity:     10     mins  95076;     Gait Training:           mins  76506;     Ultrasound:          mins  62525;    Ionto                                   mins   15467    Un-Timed:  Electrical Stimulation:         mins  36268 ( );  Dry  Darling          mins self-pay 20560; 20561  [facility]          mins 55976      Timed Treatment:   40   mins   Total Treatment:     40   mins    Paz Milian PT  [place] License # [id number]  Physical Therapist

## 2023-11-09 ENCOUNTER — TREATMENT (OUTPATIENT)
Dept: PHYSICAL THERAPY | Facility: CLINIC | Age: 58
End: 2023-11-09
Payer: OTHER GOVERNMENT

## 2023-11-09 DIAGNOSIS — M25.619 LIMITED RANGE OF MOTION (ROM) OF SHOULDER: ICD-10-CM

## 2023-11-09 DIAGNOSIS — M25.511 ACUTE PAIN OF RIGHT SHOULDER: ICD-10-CM

## 2023-11-09 DIAGNOSIS — Z96.611 STATUS POST TOTAL REPLACEMENT OF RIGHT SHOULDER: Primary | ICD-10-CM

## 2023-11-10 NOTE — PROGRESS NOTES
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    Objective   See Exercise, Manual, and Modality Logs for complete treatment.     Assessment/Plan  Pt. Tolerates treatment well this date with continued good response to gradually increasing weights and resistance form is looking more stable B and pt. Will continue to benefit from progressing reps and tension going forward.    STG:  -Patient will be independent with initial HEP in 3 weeks. MET  -Patient will demonstrate increased R shoulder external rotation PROM to 35 degrees in 4 weeks. MET  -Patient will demonstrate improved PROM of R shoulder flexion to 120 degrees and abduction to 90 degrees for progression of protocol in 4 weeks. MET  -Patient will report a reduction in R shoulder pain by >/=25% for improved sleep pattern in 4 weeks. MET    LTG:  -Patient will demonstrate increased R shoulder flexion and abduction to WFL for improved ability to reach overhead in 12 weeks. NOT MET, progressing  -Patient will demonstrate increased strength of R shoulder to grossly 4+ to 5/5 for ability to complete household tasks independently in 12 weeks. MET  -Patient will be able to return to exercise without complaints of shoulder pain in 12 weeks. NOT MET, Progressing  -Patient will report min to no R shoulder pain for improved ability to perform IADLs in 12 weeks. MET  -Patient will demonstrate increased AROM of R shoulder IR to 70 degrees and ER to 60 degrees for independence with dressing such as  pulling up pants and donning jacket in 12 weeks MET  Progress toward previous goals: Partially Met    Progress strengthening /stabilization /functional activity           Timed:         Manual Therapy:    10     mins  49682;     Therapeutic Exercise:    10     mins  58394;     Therapeutic Activity:     10     mins  89594;         Timed Treatment:   30   mins   Total Treatment:     30   mins    Nydia Figueroa PTA  Physical Therapist Assistant License #[id number]

## 2023-11-14 ENCOUNTER — TREATMENT (OUTPATIENT)
Dept: PHYSICAL THERAPY | Facility: CLINIC | Age: 58
End: 2023-11-14
Payer: OTHER GOVERNMENT

## 2023-11-14 DIAGNOSIS — Z96.611 STATUS POST TOTAL REPLACEMENT OF RIGHT SHOULDER: Primary | ICD-10-CM

## 2023-11-14 DIAGNOSIS — M25.619 LIMITED RANGE OF MOTION (ROM) OF SHOULDER: ICD-10-CM

## 2023-11-14 DIAGNOSIS — M25.511 ACUTE PAIN OF RIGHT SHOULDER: ICD-10-CM

## 2023-11-14 PROCEDURE — 97140 MANUAL THERAPY 1/> REGIONS: CPT | Performed by: PHYSICAL THERAPIST

## 2023-11-14 PROCEDURE — 97530 THERAPEUTIC ACTIVITIES: CPT | Performed by: PHYSICAL THERAPIST

## 2023-11-14 NOTE — PROGRESS NOTES
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]   Problems Addressed this Visit    None  Visit Diagnoses       Status post total replacement of right shoulder    -  Primary    Acute pain of right shoulder        Limited range of motion (ROM) of shoulder              Diagnoses         Codes Comments    Status post total replacement of right shoulder    -  Primary ICD-10-CM: Z96.611  ICD-9-CM: V43.61     Acute pain of right shoulder     ICD-10-CM: M25.511  ICD-9-CM: 719.41     Limited range of motion (ROM) of shoulder     ICD-10-CM: M25.619  ICD-9-CM: 719.51            Referring practitioner: Hong Herndon, *  Date of Initial Visit: Type: THERAPY  Noted: 2023  Today's Date: 2023    VISIT#: 53    Subjective Patient reports his shoulders have been feeling pretty good with no complaints of pain.  He does continue to note weakness into his L shoulder.      Objective     See Exercise, Manual, and Modality Logs for complete treatment.     Assessment/Plan Progressed strengthening this date with addition of standing overhead press with patient noting some tightness into superior shoulder and fatigue.  He does continue to have tightness into his shoulder with flexion, abduction, and extension with slow improvements at present.  Continue to progress strengthening and mobility as tolerated.    Progress per Plan of Care and Progress strengthening /stabilization /functional activity         Timed:         Manual Therapy:     10    mins  42471;     Therapeutic Exercise:         mins  05461;     Neuromuscular Astrid:        mins  36203;    Therapeutic Activity:     20     mins  34933;     Gait Training:           mins  56051;     Ultrasound:          mins  99136;    Ionto                                   mins   64833    Un-Timed:  Electrical  Stimulation:         mins  72224 ( );  Dry Needling          mins self-pay 25795; 20561  Traction          mins 71378      Timed Treatment:   30   mins   Total Treatment:     30   mins    Paz Milian PT  [place] License # [id number]  Physical Therapist

## 2023-11-16 ENCOUNTER — TREATMENT (OUTPATIENT)
Dept: PHYSICAL THERAPY | Facility: CLINIC | Age: 58
End: 2023-11-16
Payer: OTHER GOVERNMENT

## 2023-11-16 DIAGNOSIS — Z96.611 STATUS POST TOTAL REPLACEMENT OF RIGHT SHOULDER: Primary | ICD-10-CM

## 2023-11-16 DIAGNOSIS — M25.619 LIMITED RANGE OF MOTION (ROM) OF SHOULDER: ICD-10-CM

## 2023-11-16 DIAGNOSIS — M25.511 ACUTE PAIN OF RIGHT SHOULDER: ICD-10-CM

## 2023-11-16 PROCEDURE — 97140 MANUAL THERAPY 1/> REGIONS: CPT | Performed by: PHYSICAL THERAPIST

## 2023-11-16 PROCEDURE — 97110 THERAPEUTIC EXERCISES: CPT | Performed by: PHYSICAL THERAPIST

## 2023-11-16 NOTE — PROGRESS NOTES
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    Objective   See Exercise, Manual, and Modality Logs for complete treatment.     Assessment/Plan  Pt. Tolerates treatment very well this date increasing resistance as much as possible within Pt.s comfort and Pt. did well to be open when movements were painful and limited where needed.    STG:  -Patient will be independent with initial HEP in 3 weeks. MET  -Patient will demonstrate increased R shoulder external rotation PROM to 35 degrees in 4 weeks. MET  -Patient will demonstrate improved PROM of R shoulder flexion to 120 degrees and abduction to 90 degrees for progression of protocol in 4 weeks. MET  -Patient will report a reduction in R shoulder pain by >/=25% for improved sleep pattern in 4 weeks. MET    LTG:  -Patient will demonstrate increased R shoulder flexion and abduction to WFL for improved ability to reach overhead in 12 weeks. NOT MET, progressing  -Patient will demonstrate increased strength of R shoulder to grossly 4+ to 5/5 for ability to complete household tasks independently in 12 weeks. MET  -Patient will be able to return to exercise without complaints of shoulder pain in 12 weeks. NOT MET, Progressing  -Patient will report min to no R shoulder pain for improved ability to perform IADLs in 12 weeks. MET  -Patient will demonstrate increased AROM of R shoulder IR to 70 degrees and ER to 60 degrees for independence with dressing such as pulling up pants and donning jacket in 12 weeks MET  Progress toward previous goals:  Partially Met    Progress strengthening /stabilization /functional activity           Timed:         Manual Therapy:    10     mins  13780;     Therapeutic Exercise:    20     mins  49939;         Timed Treatment:   30   mins   Total Treatment:     30   mins    Nydia Figueroa PTA  Physical Therapist Assistant License #[id number]

## 2023-11-28 ENCOUNTER — TREATMENT (OUTPATIENT)
Dept: PHYSICAL THERAPY | Facility: CLINIC | Age: 58
End: 2023-11-28
Payer: OTHER GOVERNMENT

## 2023-11-28 DIAGNOSIS — Z96.611 STATUS POST TOTAL REPLACEMENT OF RIGHT SHOULDER: Primary | ICD-10-CM

## 2023-11-28 DIAGNOSIS — M25.511 ACUTE PAIN OF RIGHT SHOULDER: ICD-10-CM

## 2023-11-28 DIAGNOSIS — M25.619 LIMITED RANGE OF MOTION (ROM) OF SHOULDER: ICD-10-CM

## 2023-11-28 NOTE — PROGRESS NOTES
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 Pt. States his R shoulder has been improving gradually still.    Objective   See Exercise, Manual, and Modality Logs for complete treatment.     Assessment/Plan  Pt. Tolerates treatment well this date completing exercises with improved stability and full ROM at this time. Pt. Is still somewhat weak in overhead movements but much more stable in presentation overall.    STG:  -Patient will be independent with initial HEP in 3 weeks. MET  -Patient will demonstrate increased R shoulder external rotation PROM to 35 degrees in 4 weeks. MET  -Patient will demonstrate improved PROM of R shoulder flexion to 120 degrees and abduction to 90 degrees for progression of protocol in 4 weeks. MET  -Patient will report a reduction in R shoulder pain by >/=25% for improved sleep pattern in 4 weeks. MET    LTG:  -Patient will demonstrate increased R shoulder flexion and abduction to WFL for improved ability to reach overhead in 12 weeks. NOT MET, progressing  -Patient will demonstrate increased strength of R shoulder to grossly 4+ to 5/5 for ability to complete household tasks independently in 12 weeks. MET  -Patient will be able to return to exercise without complaints of shoulder pain in 12 weeks. NOT MET, Progressing  -Patient will report min to no R shoulder pain for improved ability to perform IADLs in 12 weeks. MET  -Patient will demonstrate increased AROM of R shoulder IR to 70  degrees and ER to 60 degrees for independence with dressing such as pulling up pants and donning jacket in 12 weeks MET  Progress toward previous goals: Partially Met    Progress strengthening /stabilization /functional activity           Timed:         Manual Therapy:    10     mins  35408;     Therapeutic Exercise:    20     mins  52955;     Therapeutic Activity:     10     mins  74416;         Timed Treatment:   40   mins   Total Treatment:     40   mins    Nydia Figueroa PTA  Physical Therapist Assistant License #[id number]

## 2023-11-30 ENCOUNTER — TREATMENT (OUTPATIENT)
Dept: PHYSICAL THERAPY | Facility: CLINIC | Age: 58
End: 2023-11-30
Payer: OTHER GOVERNMENT

## 2023-11-30 DIAGNOSIS — M25.511 ACUTE PAIN OF RIGHT SHOULDER: ICD-10-CM

## 2023-11-30 DIAGNOSIS — Z96.611 STATUS POST TOTAL REPLACEMENT OF RIGHT SHOULDER: Primary | ICD-10-CM

## 2023-11-30 DIAGNOSIS — M25.619 LIMITED RANGE OF MOTION (ROM) OF SHOULDER: ICD-10-CM

## 2023-11-30 PROCEDURE — 97140 MANUAL THERAPY 1/> REGIONS: CPT | Performed by: PHYSICAL THERAPIST

## 2023-11-30 PROCEDURE — 97110 THERAPEUTIC EXERCISES: CPT | Performed by: PHYSICAL THERAPIST

## 2023-11-30 NOTE — PROGRESS NOTES
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]   Problems Addressed this Visit    None  Visit Diagnoses       Status post total replacement of right shoulder    -  Primary    Acute pain of right shoulder        Limited range of motion (ROM) of shoulder              Diagnoses         Codes Comments    Status post total replacement of right shoulder    -  Primary ICD-10-CM: Z96.611  ICD-9-CM: V43.61     Acute pain of right shoulder     ICD-10-CM: M25.511  ICD-9-CM: 719.41     Limited range of motion (ROM) of shoulder     ICD-10-CM: M25.619  ICD-9-CM: 719.51            Referring practitioner: Hong Herndon, *  Date of Initial Visit: Type: THERAPY  Noted: 2023  Today's Date: 2023    VISIT#: 56    Subjective Patient reports his R shoulder feels good with no complaints of pain. His L shoulder strength is also improving but does note weakness into external and internal rotation.        Objective     See Exercise, Manual, and Modality Logs for complete treatment.     Assessment/Plan Patient with good tolerance to progression of exercise and manual interventions with no persistent pain noted, but soreness reported with PROM with overpressure into internal rotation, flexion, and abduction.  Patient was able to complete overhead press this date without compensations.  Progress discussed with patient.  Plan for patient to attend 2 additional visits and then d/c with HEP.      Progress per Plan of Care and Progress strengthening /stabilization /functional activity         Timed:         Manual Therapy:    15     mins  70282;     Therapeutic Exercise:    15     mins  81675;     Neuromuscular Astrid:        mins  60764;    Therapeutic Activity:          mins  35141;     Gait Training:           mins  00254;     Ultrasound:          mins  12548;     Ionto                                   mins   11589    Un-Timed:  Electrical Stimulation:         mins  64033 ( );  Dry Needling          mins self-pay 20560; 20561  Traction          mins 06251      Timed Treatment:   30   mins   Total Treatment:     30   mins    Paz Milian PT  [place] License # [id number]  Physical Therapist

## 2023-12-07 ENCOUNTER — TREATMENT (OUTPATIENT)
Dept: PHYSICAL THERAPY | Facility: CLINIC | Age: 58
End: 2023-12-07
Payer: OTHER GOVERNMENT

## 2023-12-07 DIAGNOSIS — M25.511 ACUTE PAIN OF RIGHT SHOULDER: ICD-10-CM

## 2023-12-07 DIAGNOSIS — Z96.611 STATUS POST TOTAL REPLACEMENT OF RIGHT SHOULDER: Primary | ICD-10-CM

## 2023-12-07 DIAGNOSIS — M25.619 LIMITED RANGE OF MOTION (ROM) OF SHOULDER: ICD-10-CM

## 2023-12-07 PROCEDURE — 97110 THERAPEUTIC EXERCISES: CPT | Performed by: PHYSICAL THERAPIST

## 2023-12-07 PROCEDURE — 97140 MANUAL THERAPY 1/> REGIONS: CPT | Performed by: PHYSICAL THERAPIST

## 2023-12-07 NOTE — PROGRESS NOTES
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 Pt. States there is still strength to be desired and continues to exercise each day.    Objective   See Exercise, Manual, and Modality Logs for complete treatment.     Assessment/Plan  Pt. Tolerates treatment well this visit and continues to tolerate gradually progressing weight for gains in strength Pt. Has achieved previous weight on most machines still gradually increasing as tolerable in some movements. Pt. Is preparing for DC at this time due to competence with HEP and gradual progress.    STG:  -Patient will be independent with initial HEP in 3 weeks. MET  -Patient will demonstrate increased R shoulder external rotation PROM to 35 degrees in 4 weeks. MET  -Patient will demonstrate improved PROM of R shoulder flexion to 120 degrees and abduction to 90 degrees for progression of protocol in 4 weeks. MET  -Patient will report a reduction in R shoulder pain by >/=25% for improved sleep pattern in 4 weeks. MET    LTG:  -Patient will demonstrate increased R shoulder flexion and abduction to WFL for improved ability to reach overhead in 12 weeks. NOT MET, progressing  -Patient will demonstrate increased strength of R shoulder to grossly 4+ to 5/5 for ability to complete household tasks independently in 12 weeks. MET  -Patient will be able to return to exercise without complaints of shoulder pain in 12 weeks. NOT MET, Progressing  -Patient will report min to no R shoulder pain for improved ability to perform  IADLs in 12 weeks. MET  -Patient will demonstrate increased AROM of R shoulder IR to 70 degrees and ER to 60 degrees for independence with dressing such as pulling up pants and donning jacket in 12 weeks MET  Progress toward previous goals: Partially Met    Progress strengthening /stabilization /functional activity           Timed:         Manual Therapy:    15     mins  99512;     Therapeutic Exercise:    15     mins  43633;         Timed Treatment:   30   mins   Total Treatment:     30   mins    Nydia Figueroa PTA  Physical Therapist Assistant License #[id number]

## 2023-12-14 ENCOUNTER — TREATMENT (OUTPATIENT)
Dept: PHYSICAL THERAPY | Facility: CLINIC | Age: 58
End: 2023-12-14
Payer: OTHER GOVERNMENT

## 2023-12-14 DIAGNOSIS — M25.619 LIMITED RANGE OF MOTION (ROM) OF SHOULDER: ICD-10-CM

## 2023-12-14 DIAGNOSIS — Z96.611 STATUS POST TOTAL REPLACEMENT OF RIGHT SHOULDER: Primary | ICD-10-CM

## 2023-12-14 DIAGNOSIS — M25.511 ACUTE PAIN OF RIGHT SHOULDER: ICD-10-CM

## 2023-12-14 PROCEDURE — 97140 MANUAL THERAPY 1/> REGIONS: CPT | Performed by: PHYSICAL THERAPIST

## 2023-12-14 PROCEDURE — 97110 THERAPEUTIC EXERCISES: CPT | Performed by: PHYSICAL THERAPIST

## 2023-12-14 NOTE — PROGRESS NOTES
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 Pt. states he is ready for DC at this time and is confident in HEP management.    QuickDASH = 16%    Objective   See Exercise, Manual, and Modality Logs for complete treatment.     Assessment/Plan  Pt. Tolerates treatment well this visit and continues to show return to former strength as well as improving ROM no showing WFL in all ranges with R and L UE. Pt. Is able to progressively increase resistance again without pain and shows readiness for DC.    STG:  -Patient will be independent with initial HEP in 3 weeks. MET  -Patient will demonstrate increased R shoulder external rotation PROM to 35 degrees in 4 weeks. MET  -Patient will demonstrate improved PROM of R shoulder flexion to 120 degrees and abduction to 90 degrees for progression of protocol in 4 weeks. MET  -Patient will report a reduction in R shoulder pain by >/=25% for improved sleep pattern in 4 weeks. MET    LTG:  -Patient will demonstrate increased R shoulder flexion and abduction to WFL for improved ability to reach overhead in 12 weeks. MET  -Patient will demonstrate increased strength of R shoulder to grossly 4+ to 5/5 for ability to complete household tasks independently in 12 weeks. MET  -Patient will be able to return to exercise without complaints of shoulder pain in 12 weeks. MET  -Patient will report min to no R shoulder pain for improved ability to perform IADLs  in 12 weeks. MET  -Patient will demonstrate increased AROM of R shoulder IR to 70 degrees and ER to 60 degrees for independence with dressing such as pulling up pants and donning jacket in 12 weeks MET  Progress toward previous goals: MET    DC to HEP at this time.           Timed:         Manual Therapy:    15     mins  73684;     Therapeutic Exercise:    15     mins  89983;         Timed Treatment:   30   mins   Total Treatment:     30   mins    Nydia Figueroa PTA  Physical Therapist Assistant License #[id number]

## 2023-12-15 NOTE — PROGRESS NOTES
Discharge Summary  Discharge Summary from Physical Therapy Report      Dates  PT visit: 2/21/2023 - 12/14/2023  Number of Visits: 58     Discharge Status of Patient: See Progress Note dated 12/14/2023    Goals: All Met    Discharge Plan: Continue with current home exercise program as instructed    Comments : Pt made good gains s/p R TSR. Pt met all goals with good ROM and strength. Quick DASH improved to 16% impairment.    Date of Discharge 12/15/2023        Amparo Horton, PT  Physical Therapist  [place] Lic# [id number]

## 2024-03-14 ENCOUNTER — OFFICE VISIT (OUTPATIENT)
Dept: ORTHOPEDIC SURGERY | Facility: CLINIC | Age: 59
End: 2024-03-14
Payer: OTHER GOVERNMENT

## 2024-03-14 VITALS — HEART RATE: 86 BPM | HEIGHT: 68 IN | BODY MASS INDEX: 19.55 KG/M2 | WEIGHT: 129 LBS

## 2024-03-14 DIAGNOSIS — Z47.89 ORTHOPEDIC AFTERCARE: Primary | ICD-10-CM

## 2024-03-14 DIAGNOSIS — M19.011 OSTEOARTHRITIS OF RIGHT GLENOHUMERAL JOINT: ICD-10-CM

## 2024-03-14 PROCEDURE — 99212 OFFICE O/P EST SF 10 MIN: CPT | Performed by: ORTHOPAEDIC SURGERY

## 2024-03-14 NOTE — PROGRESS NOTES
"     Patient ID: Maninder Edwards is a 58 y.o. male.  2/15/23 right total shoulder arthroplasty    No pain    Review of Systems:        Objective:    Pulse 86   Ht 172.7 cm (68\")   Wt 58.5 kg (129 lb)   BMI 19.61 kg/m²     Physical Examination:     Right shoulder no tenderness active elevation 175 abduction 140 external rotation 50 internal rotation L5 with intact belly press    Imaging:   right Shoulder X-Ray  Indication: Postop total shoulder  AP Y and Lateral views  Findings: Total shoulder arthroplasty in position  no bony lesion  Soft tissues normal  normal joint spaces  Hardware appropriately positioned yes      yes prior studies available for comparison.    Assessment:    Doing well after shoulder arthroplasty    Plan:   Activity as tolerated and see me as needed       Procedures          Disclaimer: Part of this note may be an electronic transcription/translation of spoken language to printed text using the Dragon Dictation System  "

## 2024-04-22 RX ORDER — AMITRIPTYLINE HYDROCHLORIDE 10 MG/1
TABLET, FILM COATED ORAL
Qty: 180 TABLET | Refills: 3 | OUTPATIENT
Start: 2024-04-22

## 2024-04-23 ENCOUNTER — TELEPHONE (OUTPATIENT)
Dept: ORTHOPEDIC SURGERY | Facility: CLINIC | Age: 59
End: 2024-04-23
Payer: OTHER GOVERNMENT

## 2024-04-23 NOTE — TELEPHONE ENCOUNTER
----- Message from Madalyn Purdy sent at 2024  1:25 PM EDT -----  Regarding: [facility]: [facility] referral  Helen,    Looks like you requested a new referral on 3/1/2024 for appointment on 3/14/2024. Can you please help look into this and respond back to Chanell?    Thank you  ----- Message -----  From: Chanell Moreira  Sent: 2024   6:16 AM EDT  To: [facility]  Subject: [facility] referral                                      Good morning, looking for updated referral on Mr. Edwards and his dos 3/14/24.  The referral ending  on claim  6/10/23..  appreciate your assistance. maria victoria

## (undated) DEVICE — SOLUTION,WATER,IRRIGATION,1000ML,STERILE: Brand: MEDLINE

## (undated) DEVICE — PIN SHLDR/HUM ALTIVATE ANATOMIC/CS/EDGE MOD/CRS/LNK VIT/E
Type: IMPLANTABLE DEVICE | Site: SHOULDER | Status: NON-FUNCTIONAL
Removed: 2023-02-15

## (undated) DEVICE — ANTIBACTERIAL UNDYED BRAIDED (POLYGLACTIN 910), SYNTHETIC ABSORBABLE SUTURE: Brand: COATED VICRYL

## (undated) DEVICE — DECANTER: Brand: UNBRANDED

## (undated) DEVICE — CVR HNDL LT SURG ACCSSRY BLU STRL

## (undated) DEVICE — DUAL CUT SAGITTAL BLADE

## (undated) DEVICE — T-MAX DISPOSABLE FACE MASK 8 PER BOX

## (undated) DEVICE — KT SURG TURNOVER 050

## (undated) DEVICE — GLV SURG SENSICARE PI ORTHO SZ7.5 LF STRL

## (undated) DEVICE — UNDERGLV SURG BIOGEL INDICAT PI SZ8 BLU

## (undated) DEVICE — SOL IRR NACL 0.9PCT ARTHROMATIC 3000ML

## (undated) DEVICE — PK TOTL SHLDR 50

## (undated) DEVICE — ADHS SKIN PREMIERPRO EXOFIN TOPICAL HI/VISC .5ML

## (undated) DEVICE — WRAP SHOULDER COLD THERAPY

## (undated) DEVICE — GLV SURG SIGNATURE ESSENTIAL PF LTX SZ8.5

## (undated) DEVICE — GLV SURG SENSICARE PI ORTHO SZ8 LF STRL

## (undated) DEVICE — NDL SUT CATGUT 1/2CIR TPR SZ5 2PK 1824-5D

## (undated) DEVICE — SUT MNCRYL 3/0 PS2 18IN MCP497G

## (undated) DEVICE — GLV SURG SENSICARE PI ORTHO SZ8.5 LF STRL

## (undated) DEVICE — SOL IRRIG NACL 1000ML

## (undated) DEVICE — SLV SCD CALF HEMOFORCE DVT THERP REPROC MD

## (undated) DEVICE — Device

## (undated) DEVICE — TBG PENCL TELESCP MEGADYNE SMOKE EVAC 15FT

## (undated) DEVICE — DRSNG SURESITE WNDW 4X4.5

## (undated) DEVICE — UNDERGLV SURG BIOGEL/PI PF SYNTH SURG SZ8.5 BLU 50/BX

## (undated) DEVICE — 3 BONE CEMENT MIXER: Brand: MIXEVAC